# Patient Record
Sex: MALE | Race: BLACK OR AFRICAN AMERICAN | Employment: OTHER | URBAN - METROPOLITAN AREA
[De-identification: names, ages, dates, MRNs, and addresses within clinical notes are randomized per-mention and may not be internally consistent; named-entity substitution may affect disease eponyms.]

---

## 2021-03-26 ENCOUNTER — APPOINTMENT (OUTPATIENT)
Dept: CT IMAGING | Age: 76
DRG: 682 | End: 2021-03-26
Attending: EMERGENCY MEDICINE
Payer: MEDICARE

## 2021-03-26 ENCOUNTER — HOSPITAL ENCOUNTER (INPATIENT)
Age: 76
LOS: 11 days | Discharge: NURSING, REHAB OR LONG-TERM CARE | DRG: 682 | End: 2021-04-07
Attending: EMERGENCY MEDICINE | Admitting: FAMILY MEDICINE
Payer: MEDICARE

## 2021-03-26 ENCOUNTER — APPOINTMENT (OUTPATIENT)
Dept: GENERAL RADIOLOGY | Age: 76
DRG: 682 | End: 2021-03-26
Attending: EMERGENCY MEDICINE
Payer: MEDICARE

## 2021-03-26 DIAGNOSIS — I48.91 ATRIAL FIBRILLATION, RAPID (HCC): ICD-10-CM

## 2021-03-26 DIAGNOSIS — E86.1 HYPOVOLEMIA: Primary | ICD-10-CM

## 2021-03-26 DIAGNOSIS — N17.9 ACUTE RENAL FAILURE, UNSPECIFIED ACUTE RENAL FAILURE TYPE (HCC): ICD-10-CM

## 2021-03-26 LAB
ALBUMIN SERPL-MCNC: 3.3 G/DL (ref 3.5–5)
ALBUMIN/GLOB SERPL: 0.6 {RATIO} (ref 1.1–2.2)
ALP SERPL-CCNC: 131 U/L (ref 45–117)
ALT SERPL-CCNC: 90 U/L (ref 12–78)
ANION GAP SERPL CALC-SCNC: 20 MMOL/L (ref 5–15)
AST SERPL W P-5'-P-CCNC: 97 U/L (ref 15–37)
BASOPHILS # BLD: 0 K/UL (ref 0–0.1)
BASOPHILS NFR BLD: 0 % (ref 0–1)
BILIRUB SERPL-MCNC: 0.6 MG/DL (ref 0.2–1)
BUN SERPL-MCNC: 207 MG/DL (ref 6–20)
BUN/CREAT SERPL: 25 (ref 12–20)
CA-I BLD-MCNC: 9.6 MG/DL (ref 8.5–10.1)
CHLORIDE SERPL-SCNC: 116 MMOL/L (ref 97–108)
CO2 SERPL-SCNC: 18 MMOL/L (ref 21–32)
CREAT SERPL-MCNC: 8.18 MG/DL (ref 0.7–1.3)
DIFFERENTIAL METHOD BLD: ABNORMAL
EOSINOPHIL # BLD: 0 K/UL (ref 0–0.4)
EOSINOPHIL NFR BLD: 0 % (ref 0–7)
ERYTHROCYTE [DISTWIDTH] IN BLOOD BY AUTOMATED COUNT: 20.1 % (ref 11.5–14.5)
GLOBULIN SER CALC-MCNC: 5.3 G/DL (ref 2–4)
GLUCOSE SERPL-MCNC: 119 MG/DL (ref 65–100)
HCT VFR BLD AUTO: 46.4 % (ref 36.6–50.3)
HGB BLD-MCNC: 14.2 G/DL (ref 12.1–17)
IMM GRANULOCYTES # BLD AUTO: 0 K/UL (ref 0–0.04)
IMM GRANULOCYTES NFR BLD AUTO: 0 % (ref 0–0.5)
LACTATE SERPL-SCNC: 1.8 MMOL/L (ref 0.4–2)
LYMPHOCYTES # BLD: 0.8 K/UL (ref 0.8–3.5)
LYMPHOCYTES NFR BLD: 6 % (ref 12–49)
MCH RBC QN AUTO: 28 PG (ref 26–34)
MCHC RBC AUTO-ENTMCNC: 30.6 G/DL (ref 30–36.5)
MCV RBC AUTO: 91.3 FL (ref 80–99)
MONOCYTES # BLD: 0.4 K/UL (ref 0–1)
MONOCYTES NFR BLD: 3 % (ref 5–13)
NEUTS SEG # BLD: 10.8 K/UL (ref 1.8–8)
NEUTS SEG NFR BLD: 91 % (ref 32–75)
PLATELET # BLD AUTO: 261 K/UL (ref 150–400)
PMV BLD AUTO: 11.8 FL (ref 8.9–12.9)
POTASSIUM SERPL-SCNC: 5.6 MMOL/L (ref 3.5–5.1)
PROT SERPL-MCNC: 8.6 G/DL (ref 6.4–8.2)
RBC # BLD AUTO: 5.08 M/UL (ref 4.1–5.7)
SODIUM SERPL-SCNC: 154 MMOL/L (ref 136–145)
WBC # BLD AUTO: 11.9 K/UL (ref 4.1–11.1)

## 2021-03-26 PROCEDURE — 70450 CT HEAD/BRAIN W/O DYE: CPT

## 2021-03-26 PROCEDURE — 99285 EMERGENCY DEPT VISIT HI MDM: CPT

## 2021-03-26 PROCEDURE — 84443 ASSAY THYROID STIM HORMONE: CPT

## 2021-03-26 PROCEDURE — 74011250636 HC RX REV CODE- 250/636: Performed by: EMERGENCY MEDICINE

## 2021-03-26 PROCEDURE — 74176 CT ABD & PELVIS W/O CONTRAST: CPT

## 2021-03-26 PROCEDURE — 96361 HYDRATE IV INFUSION ADD-ON: CPT

## 2021-03-26 PROCEDURE — 71045 X-RAY EXAM CHEST 1 VIEW: CPT

## 2021-03-26 PROCEDURE — 83605 ASSAY OF LACTIC ACID: CPT

## 2021-03-26 PROCEDURE — 80053 COMPREHEN METABOLIC PANEL: CPT

## 2021-03-26 PROCEDURE — 82550 ASSAY OF CK (CPK): CPT

## 2021-03-26 PROCEDURE — 83735 ASSAY OF MAGNESIUM: CPT

## 2021-03-26 PROCEDURE — 84484 ASSAY OF TROPONIN QUANT: CPT

## 2021-03-26 PROCEDURE — 93005 ELECTROCARDIOGRAM TRACING: CPT

## 2021-03-26 PROCEDURE — 85025 COMPLETE CBC W/AUTO DIFF WBC: CPT

## 2021-03-26 PROCEDURE — 36415 COLL VENOUS BLD VENIPUNCTURE: CPT

## 2021-03-26 PROCEDURE — 87040 BLOOD CULTURE FOR BACTERIA: CPT

## 2021-03-26 RX ORDER — SODIUM CHLORIDE, SODIUM LACTATE, POTASSIUM CHLORIDE, CALCIUM CHLORIDE 600; 310; 30; 20 MG/100ML; MG/100ML; MG/100ML; MG/100ML
1000 INJECTION, SOLUTION INTRAVENOUS CONTINUOUS
Status: DISCONTINUED | OUTPATIENT
Start: 2021-03-26 | End: 2021-04-01

## 2021-03-26 RX ADMIN — SODIUM CHLORIDE, POTASSIUM CHLORIDE, SODIUM LACTATE AND CALCIUM CHLORIDE 1000 ML: 600; 310; 30; 20 INJECTION, SOLUTION INTRAVENOUS at 23:17

## 2021-03-26 RX ADMIN — SODIUM CHLORIDE 500 ML: 9 INJECTION, SOLUTION INTRAVENOUS at 22:31

## 2021-03-26 RX ADMIN — SODIUM CHLORIDE 1000 ML: 9 INJECTION, SOLUTION INTRAVENOUS at 22:31

## 2021-03-26 NOTE — Clinical Note
Status[de-identified] INPATIENT [101]   Type of Bed: Remote Telemetry [29]   Inpatient Hospitalization Certified Necessary for the Following Reasons: 3. Patient receiving treatment that can only be provided in an inpatient setting (further clarification in H&P documentation)   Admitting Diagnosis: Acute renal failure (ARF) ([facility] [4609073]   Admitting Diagnosis: Hypovolemia [276.52. ICD-9-CM]   Admitting Diagnosis: Atrial fibrillation, rapid [facility]) [[id number]]   Admitting Physician: Kelly Zapata [[id number]]   Attending Physician: Kelly Zapata [[id number]]   Estimated Length of Stay: 3-4 Midnights   Discharge Plan[de-identified] Extended Care Facility (e.g. Adult Home, Nursing Home, etc.)

## 2021-03-27 PROBLEM — N17.9 ACUTE RENAL FAILURE (ARF) (HCC): Status: ACTIVE | Noted: 2021-03-27

## 2021-03-27 PROBLEM — N17.9 AKI (ACUTE KIDNEY INJURY) (HCC): Status: ACTIVE | Noted: 2021-03-27

## 2021-03-27 PROBLEM — I48.91 ATRIAL FIBRILLATION, RAPID (HCC): Status: ACTIVE | Noted: 2021-03-27

## 2021-03-27 PROBLEM — E86.1 HYPOVOLEMIA: Status: ACTIVE | Noted: 2021-03-27

## 2021-03-27 LAB
ALBUMIN SERPL-MCNC: 2.3 G/DL (ref 3.5–5)
ANION GAP SERPL CALC-SCNC: 13 MMOL/L (ref 5–15)
ANION GAP SERPL CALC-SCNC: 16 MMOL/L (ref 5–15)
APPEARANCE UR: ABNORMAL
APTT PPP: 26.1 SEC (ref 23–35.7)
ATRIAL RATE: 147 BPM
BACTERIA URNS QL MICRO: NEGATIVE /HPF
BACTERIA URNS QL MICRO: NEGATIVE /HPF
BASOPHILS # BLD: 0 K/UL (ref 0–0.1)
BASOPHILS NFR BLD: 0 % (ref 0–1)
BILIRUB UR QL: NEGATIVE
BUN SERPL-MCNC: 179 MG/DL (ref 6–20)
BUN SERPL-MCNC: 186 MG/DL (ref 6–20)
BUN/CREAT SERPL: 27 (ref 12–20)
BUN/CREAT SERPL: 28 (ref 12–20)
CA-I BLD-MCNC: 8.1 MG/DL (ref 8.5–10.1)
CA-I BLD-MCNC: 8.2 MG/DL (ref 8.5–10.1)
CALCULATED R AXIS, ECG10: 36 DEGREES
CALCULATED T AXIS, ECG11: -177 DEGREES
CHLORIDE SERPL-SCNC: 122 MMOL/L (ref 97–108)
CHLORIDE SERPL-SCNC: 125 MMOL/L (ref 97–108)
CK SERPL-CCNC: 763 U/L (ref 39–308)
CO2 SERPL-SCNC: 17 MMOL/L (ref 21–32)
CO2 SERPL-SCNC: 20 MMOL/L (ref 21–32)
COLOR UR: YELLOW
COVID-19 RAPID TEST, COVR: NOT DETECTED
CREAT SERPL-MCNC: 6.44 MG/DL (ref 0.7–1.3)
CREAT SERPL-MCNC: 6.79 MG/DL (ref 0.7–1.3)
DIAGNOSIS, 93000: NORMAL
DIFFERENTIAL METHOD BLD: ABNORMAL
EOSINOPHIL # BLD: 0 K/UL (ref 0–0.4)
EOSINOPHIL NFR BLD: 0 % (ref 0–7)
ERYTHROCYTE [DISTWIDTH] IN BLOOD BY AUTOMATED COUNT: 20.7 % (ref 11.5–14.5)
GLUCOSE BLD STRIP.AUTO-MCNC: 84 MG/DL (ref 65–100)
GLUCOSE SERPL-MCNC: 76 MG/DL (ref 65–100)
GLUCOSE SERPL-MCNC: 79 MG/DL (ref 65–100)
GLUCOSE UR STRIP.AUTO-MCNC: NEGATIVE MG/DL
GRAN CASTS URNS QL MICRO: 4 /LPF
HCT VFR BLD AUTO: 38.4 % (ref 36.6–50.3)
HGB BLD-MCNC: 11.5 G/DL (ref 12.1–17)
HGB UR QL STRIP: ABNORMAL
HYALINE CASTS URNS QL MICRO: >20 /LPF (ref 0–5)
IMM GRANULOCYTES # BLD AUTO: 0 K/UL (ref 0–0.04)
IMM GRANULOCYTES NFR BLD AUTO: 0 % (ref 0–0.5)
KETONES UR QL STRIP.AUTO: NEGATIVE MG/DL
LEUKOCYTE ESTERASE UR QL STRIP.AUTO: ABNORMAL
LYMPHOCYTES # BLD: 0.2 K/UL (ref 0.8–3.5)
LYMPHOCYTES NFR BLD: 2 % (ref 12–49)
MAGNESIUM SERPL-MCNC: 5.6 MG/DL (ref 1.6–2.4)
MCH RBC QN AUTO: 27.6 PG (ref 26–34)
MCHC RBC AUTO-ENTMCNC: 29.9 G/DL (ref 30–36.5)
MCV RBC AUTO: 92.1 FL (ref 80–99)
MONOCYTES # BLD: 0.3 K/UL (ref 0–1)
MONOCYTES NFR BLD: 3 % (ref 5–13)
MUCOUS THREADS URNS QL MICRO: ABNORMAL /LPF
NEUTS SEG # BLD: 8.5 K/UL (ref 1.8–8)
NEUTS SEG NFR BLD: 95 % (ref 32–75)
NITRITE UR QL STRIP.AUTO: NEGATIVE
PERFORMED BY, TECHID: NORMAL
PH UR STRIP: 5 [PH] (ref 5–8)
PHOSPHATE SERPL-MCNC: 7.8 MG/DL (ref 2.6–4.7)
PLATELET # BLD AUTO: 173 K/UL (ref 150–400)
PMV BLD AUTO: 11.6 FL (ref 8.9–12.9)
POTASSIUM SERPL-SCNC: 5.8 MMOL/L (ref 3.5–5.1)
POTASSIUM SERPL-SCNC: 5.9 MMOL/L (ref 3.5–5.1)
PROT UR STRIP-MCNC: 100 MG/DL
Q-T INTERVAL, ECG07: 292 MS
QRS DURATION, ECG06: 90 MS
QTC CALCULATION (BEZET), ECG08: 456 MS
RBC # BLD AUTO: 4.17 M/UL (ref 4.1–5.7)
RBC #/AREA URNS HPF: ABNORMAL /HPF (ref 0–5)
RBC #/AREA URNS HPF: NORMAL /HPF (ref 0–5)
SARS-COV-2, COV2: NORMAL
SODIUM SERPL-SCNC: 155 MMOL/L (ref 136–145)
SODIUM SERPL-SCNC: 158 MMOL/L (ref 136–145)
SP GR UR REFRACTOMETRY: 1.02 (ref 1–1.03)
SPECIMEN SOURCE: NORMAL
THERAPEUTIC RANGE,PTTT: NORMAL SEC (ref 68–109)
TROPONIN I SERPL-MCNC: 0.33 NG/ML
TROPONIN I SERPL-MCNC: 0.57 NG/ML
TSH SERPL DL<=0.05 MIU/L-ACNC: 1.43 UIU/ML (ref 0.36–3.74)
UROBILINOGEN UR QL STRIP.AUTO: 0.1 EU/DL (ref 0.1–1)
VENTRICULAR RATE, ECG03: 147 BPM
WBC # BLD AUTO: 8.9 K/UL (ref 4.1–11.1)
WBC URNS QL MICRO: ABNORMAL /HPF (ref 0–4)
WBC URNS QL MICRO: NORMAL /HPF (ref 0–4)

## 2021-03-27 PROCEDURE — 74011250636 HC RX REV CODE- 250/636: Performed by: EMERGENCY MEDICINE

## 2021-03-27 PROCEDURE — 65270000029 HC RM PRIVATE

## 2021-03-27 PROCEDURE — 85730 THROMBOPLASTIN TIME PARTIAL: CPT

## 2021-03-27 PROCEDURE — 74011250637 HC RX REV CODE- 250/637: Performed by: FAMILY MEDICINE

## 2021-03-27 PROCEDURE — 74011250636 HC RX REV CODE- 250/636: Performed by: FAMILY MEDICINE

## 2021-03-27 PROCEDURE — 74011000250 HC RX REV CODE- 250: Performed by: EMERGENCY MEDICINE

## 2021-03-27 PROCEDURE — 74011000258 HC RX REV CODE- 258: Performed by: INTERNAL MEDICINE

## 2021-03-27 PROCEDURE — 80048 BASIC METABOLIC PNL TOTAL CA: CPT

## 2021-03-27 PROCEDURE — 74011000258 HC RX REV CODE- 258: Performed by: EMERGENCY MEDICINE

## 2021-03-27 PROCEDURE — 74011250637 HC RX REV CODE- 250/637: Performed by: INTERNAL MEDICINE

## 2021-03-27 PROCEDURE — 85025 COMPLETE CBC W/AUTO DIFF WBC: CPT

## 2021-03-27 PROCEDURE — 87635 SARS-COV-2 COVID-19 AMP PRB: CPT

## 2021-03-27 PROCEDURE — 74011250636 HC RX REV CODE- 250/636: Performed by: INTERNAL MEDICINE

## 2021-03-27 PROCEDURE — 96365 THER/PROPH/DIAG IV INF INIT: CPT

## 2021-03-27 PROCEDURE — 74011250637 HC RX REV CODE- 250/637

## 2021-03-27 PROCEDURE — 74011000250 HC RX REV CODE- 250: Performed by: INTERNAL MEDICINE

## 2021-03-27 PROCEDURE — 81001 URINALYSIS AUTO W/SCOPE: CPT

## 2021-03-27 PROCEDURE — 36415 COLL VENOUS BLD VENIPUNCTURE: CPT

## 2021-03-27 PROCEDURE — 82962 GLUCOSE BLOOD TEST: CPT

## 2021-03-27 PROCEDURE — 96376 TX/PRO/DX INJ SAME DRUG ADON: CPT

## 2021-03-27 PROCEDURE — 87086 URINE CULTURE/COLONY COUNT: CPT

## 2021-03-27 PROCEDURE — 96374 THER/PROPH/DIAG INJ IV PUSH: CPT

## 2021-03-27 PROCEDURE — 80069 RENAL FUNCTION PANEL: CPT

## 2021-03-27 RX ORDER — HEPARIN SODIUM 10000 [USP'U]/100ML
12-25 INJECTION, SOLUTION INTRAVENOUS
Status: DISCONTINUED | OUTPATIENT
Start: 2021-03-27 | End: 2021-04-01

## 2021-03-27 RX ORDER — SODIUM CHLORIDE, SODIUM LACTATE, POTASSIUM CHLORIDE, CALCIUM CHLORIDE 600; 310; 30; 20 MG/100ML; MG/100ML; MG/100ML; MG/100ML
1000 INJECTION, SOLUTION INTRAVENOUS CONTINUOUS
Status: DISCONTINUED | OUTPATIENT
Start: 2021-03-27 | End: 2021-04-01

## 2021-03-27 RX ORDER — SODIUM CHLORIDE, SODIUM LACTATE, POTASSIUM CHLORIDE, CALCIUM CHLORIDE 600; 310; 30; 20 MG/100ML; MG/100ML; MG/100ML; MG/100ML
125 INJECTION, SOLUTION INTRAVENOUS CONTINUOUS
Status: DISCONTINUED | OUTPATIENT
Start: 2021-03-27 | End: 2021-04-01

## 2021-03-27 RX ORDER — ATORVASTATIN CALCIUM 40 MG/1
40 TABLET, FILM COATED ORAL
Status: DISCONTINUED | OUTPATIENT
Start: 2021-03-27 | End: 2021-04-08 | Stop reason: HOSPADM

## 2021-03-27 RX ORDER — POLYETHYLENE GLYCOL 3350 17 G/17G
17 POWDER, FOR SOLUTION ORAL DAILY PRN
Status: DISCONTINUED | OUTPATIENT
Start: 2021-03-27 | End: 2021-04-08 | Stop reason: HOSPADM

## 2021-03-27 RX ORDER — ASPIRIN 325 MG
325 TABLET ORAL DAILY
Status: DISCONTINUED | OUTPATIENT
Start: 2021-03-28 | End: 2021-04-04

## 2021-03-27 RX ORDER — ONDANSETRON 4 MG/1
4 TABLET, ORALLY DISINTEGRATING ORAL
Status: DISCONTINUED | OUTPATIENT
Start: 2021-03-27 | End: 2021-04-08 | Stop reason: HOSPADM

## 2021-03-27 RX ORDER — DILTIAZEM HCL/D5W 125 MG/125
0-15 PLASTIC BAG, INJECTION (ML) INTRAVENOUS
Status: DISCONTINUED | OUTPATIENT
Start: 2021-03-27 | End: 2021-03-27

## 2021-03-27 RX ORDER — HEPARIN SODIUM 5000 [USP'U]/ML
5000 INJECTION, SOLUTION INTRAVENOUS; SUBCUTANEOUS EVERY 8 HOURS
Status: DISCONTINUED | OUTPATIENT
Start: 2021-03-27 | End: 2021-03-27

## 2021-03-27 RX ORDER — SODIUM CHLORIDE 9 MG/ML
75 INJECTION, SOLUTION INTRAVENOUS CONTINUOUS
Status: DISCONTINUED | OUTPATIENT
Start: 2021-03-27 | End: 2021-03-27

## 2021-03-27 RX ORDER — DILTIAZEM HYDROCHLORIDE 5 MG/ML
10 INJECTION INTRAVENOUS
Status: COMPLETED | OUTPATIENT
Start: 2021-03-27 | End: 2021-03-27

## 2021-03-27 RX ORDER — DEXTROSE MONOHYDRATE AND SODIUM CHLORIDE 5; .225 G/100ML; G/100ML
150 INJECTION, SOLUTION INTRAVENOUS CONTINUOUS
Status: DISCONTINUED | OUTPATIENT
Start: 2021-03-27 | End: 2021-03-30

## 2021-03-27 RX ORDER — HEPARIN SODIUM 1000 [USP'U]/ML
30 INJECTION, SOLUTION INTRAVENOUS; SUBCUTANEOUS AS NEEDED
Status: DISCONTINUED | OUTPATIENT
Start: 2021-03-27 | End: 2021-04-01

## 2021-03-27 RX ORDER — CALCIUM GLUCONATE 20 MG/ML
1 INJECTION, SOLUTION INTRAVENOUS ONCE
Status: COMPLETED | OUTPATIENT
Start: 2021-03-27 | End: 2021-03-27

## 2021-03-27 RX ORDER — HEPARIN SODIUM 1000 [USP'U]/ML
60 INJECTION, SOLUTION INTRAVENOUS; SUBCUTANEOUS AS NEEDED
Status: DISCONTINUED | OUTPATIENT
Start: 2021-03-27 | End: 2021-04-01

## 2021-03-27 RX ORDER — ACETAMINOPHEN 650 MG/1
650 SUPPOSITORY RECTAL
Status: DISCONTINUED | OUTPATIENT
Start: 2021-03-27 | End: 2021-04-08 | Stop reason: HOSPADM

## 2021-03-27 RX ORDER — SODIUM POLYSTYRENE SULFONATE 15 G/60ML
30 SUSPENSION ORAL; RECTAL
Status: COMPLETED | OUTPATIENT
Start: 2021-03-27 | End: 2021-03-27

## 2021-03-27 RX ORDER — ACETAMINOPHEN 325 MG/1
650 TABLET ORAL
Status: DISCONTINUED | OUTPATIENT
Start: 2021-03-27 | End: 2021-04-08 | Stop reason: HOSPADM

## 2021-03-27 RX ORDER — ONDANSETRON 2 MG/ML
4 INJECTION INTRAMUSCULAR; INTRAVENOUS
Status: DISCONTINUED | OUTPATIENT
Start: 2021-03-27 | End: 2021-04-08 | Stop reason: HOSPADM

## 2021-03-27 RX ADMIN — SODIUM POLYSTYRENE SULFONATE 30 G: 15 SUSPENSION ORAL; RECTAL at 14:43

## 2021-03-27 RX ADMIN — CALCIUM GLUCONATE 1000 MG: 20 INJECTION, SOLUTION INTRAVENOUS at 14:43

## 2021-03-27 RX ADMIN — NITROGLYCERIN 1 INCH: 20 OINTMENT TOPICAL at 17:44

## 2021-03-27 RX ADMIN — DEXTROSE AND SODIUM CHLORIDE 150 ML/HR: 5; 200 INJECTION, SOLUTION INTRAVENOUS at 17:45

## 2021-03-27 RX ADMIN — Medication 10 MG/HR: at 10:37

## 2021-03-27 RX ADMIN — HEPARIN SODIUM 16 UNITS/KG/HR: 10000 INJECTION, SOLUTION INTRAVENOUS at 18:07

## 2021-03-27 RX ADMIN — Medication 5 MG/HR: at 01:51

## 2021-03-27 RX ADMIN — SODIUM CHLORIDE, POTASSIUM CHLORIDE, SODIUM LACTATE AND CALCIUM CHLORIDE 125 ML/HR: 600; 310; 30; 20 INJECTION, SOLUTION INTRAVENOUS at 03:46

## 2021-03-27 RX ADMIN — SODIUM CHLORIDE, POTASSIUM CHLORIDE, SODIUM LACTATE AND CALCIUM CHLORIDE 1000 ML: 600; 310; 30; 20 INJECTION, SOLUTION INTRAVENOUS at 01:46

## 2021-03-27 RX ADMIN — DEXTROSE AND SODIUM CHLORIDE 150 ML/HR: 5; 200 INJECTION, SOLUTION INTRAVENOUS at 18:10

## 2021-03-27 RX ADMIN — DEXTROSE MONOHYDRATE 150 MG: 50 INJECTION, SOLUTION INTRAVENOUS at 15:03

## 2021-03-27 RX ADMIN — DILTIAZEM HYDROCHLORIDE 10 MG: 5 INJECTION INTRAVENOUS at 01:45

## 2021-03-27 RX ADMIN — CEFTRIAXONE SODIUM 1 G: 1 INJECTION, POWDER, FOR SOLUTION INTRAMUSCULAR; INTRAVENOUS at 14:44

## 2021-03-27 RX ADMIN — SODIUM CHLORIDE, POTASSIUM CHLORIDE, SODIUM LACTATE AND CALCIUM CHLORIDE 1000 ML: 600; 310; 30; 20 INJECTION, SOLUTION INTRAVENOUS at 05:02

## 2021-03-27 RX ADMIN — SODIUM CHLORIDE 75 ML/HR: 9 INJECTION, SOLUTION INTRAVENOUS at 12:25

## 2021-03-27 RX ADMIN — PIPERACILLIN AND TAZOBACTAM 3.38 G: 3; .375 INJECTION, POWDER, FOR SOLUTION INTRAVENOUS at 00:17

## 2021-03-27 RX ADMIN — AMIODARONE HYDROCHLORIDE 1 MG/MIN: 50 INJECTION, SOLUTION INTRAVENOUS at 15:20

## 2021-03-27 RX ADMIN — NITROGLYCERIN 1 INCH: 20 OINTMENT TOPICAL at 21:48

## 2021-03-27 NOTE — CONSULTS
This is an inpatient cardiology consultation requested by Dr. Manish Watters for paroxysmal atrial fibrillation with rapid ventricular response. CHIEF COMPLAINT:  Lethargy and weakness    HISTORY OF PRESENT ILLNESS  75yom with known dementia and prior stroke apparently noncommunicative at baseline. History is obtained from chart and discussion with other consultants. Has had decreased oral intake for the last several days. I was called for atrial fibrillation with rapid ventricular response. Patient cannot tell me whether or not he experiences palpitations. No known history of atrial fibrillation per chart review. He does have history of massive stroke in the past and this is corroborated by CT here in the hospital.  He does have known history of CKD per chart however I have no prior labs for review. I have discussed with nephrology consultant who notes that the severity of current renal dysfunction is new. Patient does appear to be making urine. Frankly turbid per urinalysis as well with 20-50 WBC. Patient has already been started on broad-spectrum antibiotics by emergency department. At current time continues on diltiazem infusion. Heart rate is fast in the 130s. Irregular. He remains hemodynamically stable. No prior anticoagulation. He is not on anticoagulation at the current time other than DVT prophylaxis dosing of heparin. REVIEW OF SYSTEMS  All other systems reviewed and are negative except for the pertinent positives as noted in the HPI. PAST MEDICAL HISTORY  Past Medical History:   Diagnosis Date    Anemia     BPH (benign prostatic hyperplasia)     CKD (chronic kidney disease)     [facility]     Dementia ([facility]     DVT (deep venous thrombosis) (HCC)     GERD (gastroesophageal reflux disease)     Glaucoma     Hyperlipemia          SOCIAL HISTORY  No alcohol, tobacco, or drug use. FAMILY HISTORY  Fully reviewed and negative.  No history of early heart disease amongst first degree relatives. PHYSICAL EXAMINATION  Visit Vitals  /63   Pulse (!) 117   Temp 97.4 °F (36.3 °C)   Resp 24   Ht 6' 2\" (1.88 m)   Wt 56.7 kg (125 lb)   SpO2 94%   BMI 16.05 kg/m²       General: Not conversant. Minimally interactive. ?baseline. HEENT/neck: no JVD, no masses, trachea midline. Pulmonary: Decreased breath sounds than expected I cannot appreciate crackles. Cardiovascular: Irregular rate irregular rhythm; no murmurs, rubs or gallops. Normal point of maximal impulse. No peripheral edema   GI: soft, nontender, no hepatosplenomegaly  Hematology/Oncology: no lymphadenopathy; no bruising  Skin: warm and dry, no rashes, lesions, or ulcer  Musculoskeletal: Moving all four extremities. Gait and station not assessed    MEDICAL DECISION MAKING  ECG was independently viewed, my impression: Atrial fibrillation with rapid ventricular response    Laboratory panel was independently viewed, my impression: Acute kidney injury stage III secondary to prerenal azotemia with BUN value of 186. Urinalysis was independently viewed, my impression: 20-50 WBCs seen    Head CT was independently viewed, my impression: Prior stroke. No acute process. Laboratory panel independently viewed, my impression: NSTEMI type II with elevated troponin    IMPRESSION/PLAN  Paroxysmal atrial fibrillation with rapid ventricular response  Acute kidney injury stage III secondary to prerenal azotemia  NSTEMI type II secondary to supply-demand, not primary event  Urinary tract infection  Hyperkalemia secondary to renal failure (issue #2)    For paroxysmal atrial fibrillation I will stop diltiazem infusion amiodarone bolus and infusion. Will begin heparin infusion for stroke prophylaxis (especially important for this man with prior stroke). At time of eventual disposition consideration can be given to low-dose Eliquis therapy.   For NSTEMI type II focus clinical care on management of underlying disease process. For acute kidney injury stage III do recommend fluid hydration. Defer to nephrology consultation. Agree with broad-spectrum antibiotics for urinary tract infection. Patient is at high risk for sepsis. For hyperkalemia do recommend therapy for renal failure. Given the multiple metabolic derangements IV calcium infusion is reasonable to help prevent against further malignant arrhythmia. Nephrology consultation agrees. Please feel free to call with any questions or concerns. I do recommend echocardiogram (ordered).

## 2021-03-27 NOTE — ED NOTES
Left voicemail for PICC team to evaluate pt in am for better access to accommodate fluid volume need

## 2021-03-27 NOTE — ED TRIAGE NOTES
Pt sent from [facility] reports since admission on 3/24 pt has not taken medication nor eating or drinking. Pt at baseline mental status per staff.

## 2021-03-27 NOTE — H&P
History and Physical    NAME: Carson Juarez   :  1945   MRN:  [id number]     Date/Time:  3/27/2021 4:42 PM    Patient PCP: Damaris Jeong MD  ______________________________________________________________________             Subjective:     CHIEF COMPLAINT:     Lethargic and weakness    HISTORY OF PRESENT ILLNESS:       Patient is a 66y.o. year old male with significant past medical history of dementia history of stroke in the past history of COVID-19 pneumonitis in the past obstructive uropathy acute kidney injury proximal A. fib nonpressure chronic ulcer of the lower leg history of MRSA infection major depression history of acute emboli and thrombosis history of peripheral vascular disease history of hypertension hyperlipidemia history of GERD history of BPH history of CVA was transferred from the nursing home because on eating drinking well since patient was diagnosed with Covid after that stopped eating drinking  Seen by the ER physician work-up shows acute kidney injury with hyperkalemia    Past Medical History:   Diagnosis Date    Anemia     BPH (benign prostatic hyperplasia)     CKD (chronic kidney disease)     [facility]     Dementia ([facility]     DVT (deep venous thrombosis) ([facility]     GERD (gastroesophageal reflux disease)     Glaucoma     Hyperlipemia         No past surgical history on file. Social History     Tobacco Use    Smoking status: Unknown If Ever Smoked   Substance Use Topics    Alcohol use: Not Currently        No family history on file.     Allergies   Allergen Reactions    Flonase [Fluticasone] Unknown (comments)        Prior to Admission medications    Not on File         Current Facility-Administered Medications:     lactated Ringers infusion 1,000 mL, 1,000 mL, IntraVENous, CONTINUOUS, Josep Moon MD, Stopped at 21 0536    lactated Ringers infusion, 125 mL/hr, IntraVENous, CONTINUOUS, Josep Moon MD, Last Rate: 125 mL/hr at 03/27/21 0346, 125 mL/hr at 03/27/21 0346    lactated Ringers infusion 1,000 mL, 1,000 mL, IntraVENous, CONTINUOUS, Unique Moon MD, Stopped at 03/27/21 0535    acetaminophen (TYLENOL) tablet 650 mg, 650 mg, Oral, Q6H PRN **OR** acetaminophen (TYLENOL) suppository 650 mg, 650 mg, Rectal, Q6H PRN, Unique Moon MD    polyethylene glycol (MIRALAX) packet 17 g, 17 g, Oral, DAILY PRN, Unique Moon MD    ondansetron (ZOFRAN ODT) tablet 4 mg, 4 mg, Oral, Q8H PRN **OR** ondansetron (ZOFRAN) injection 4 mg, 4 mg, IntraVENous, Q6H PRN, Carlene Moon MD    heparin 25,000 units in D5W 250 ml infusion, 12-25 Units/kg/hr, IntraVENous, TITRATE, Rah Pedroza MD    amiodarone (CORDARONE) 375 mg in dextrose 5% 250 mL infusion, 0.5-1 mg/min, IntraVENous, TITRATE, Rah Pedroza MD    heparin (porcine) 1,000 unit/mL injection 3,400 Units, 60 Units/kg, IntraVENous, PRN **OR** heparin (porcine) 1,000 unit/mL injection 1,700 Units, 30 Units/kg, IntraVENous, PRN, Rah Pedroza MD    dextrose 5 % - 0.2% NaCl infusion, 150 mL/hr, IntraVENous, CONTINUOUS, Blanca Hand MD    cefTRIAXone (ROCEPHIN) 1 g in sterile water (preservative free) 10 mL IV syringe, 1 g, IntraVENous, Q24H, Brendan Coreas MD, 1 g at 03/27/21 1444    lactated Ringers infusion 1,000 mL, 1,000 mL, IntraVENous, CONTINUOUS, Carlene Moon MD, 1,000 mL at 03/26/21 2317    LAB DATA REVIEWED:    Recent Results (from the past 24 hour(s))   EKG, 12 LEAD, INITIAL    Collection Time: 03/26/21  8:37 PM   Result Value Ref Range    Ventricular Rate 147 BPM    Atrial Rate 147 BPM    QRS Duration 90 ms    Q-T Interval 292 ms    QTC Calculation (Bezet) 456 ms    Calculated R Axis 36 degrees    Calculated T Axis -177 degrees    Diagnosis       Atrial fibrillation with rapid ventricular response  ST & T wave abnormality, consider inferolateral ischemia  Abnormal ECG  No previous ECGs available  Confirmed by Aaron Walton ([id number]) on 3/27/2021 8:49:30 AM     CBC WITH AUTOMATED DIFF    Collection Time: 03/26/21  8:45 PM   Result Value Ref Range    WBC 11.9 (H) 4.1 - 11.1 K/uL    RBC 5.08 4.10 - 5.70 M/uL    HGB 14.2 12.1 - 17.0 g/dL    HCT 46.4 36.6 - 50.3 %    MCV 91.3 80.0 - 99.0 FL    MCH 28.0 26.0 - 34.0 PG    MCHC 30.6 30.0 - 36.5 g/dL    RDW 20.1 (H) 11.5 - 14.5 %    PLATELET 167 132 - 451 K/uL    MPV 11.8 8.9 - 12.9 FL    NEUTROPHILS 91 (H) 32 - 75 %    LYMPHOCYTES 6 (L) 12 - 49 %    MONOCYTES 3 (L) 5 - 13 %    EOSINOPHILS 0 0 - 7 %    BASOPHILS 0 0 - 1 %    IMMATURE GRANULOCYTES 0 0.0 - 0.5 %    ABS. NEUTROPHILS 10.8 (H) 1.8 - 8.0 K/UL    ABS. LYMPHOCYTES 0.8 0.8 - 3.5 K/UL    ABS. MONOCYTES 0.4 0.0 - 1.0 K/UL    ABS. EOSINOPHILS 0.0 0.0 - 0.4 K/UL    ABS. BASOPHILS 0.0 0.0 - 0.1 K/UL    ABS. IMM. GRANS. 0.0 0.00 - 0.04 K/UL    DF AUTOMATED     METABOLIC PANEL, COMPREHENSIVE    Collection Time: 03/26/21  8:45 PM   Result Value Ref Range    Sodium 154 (H) 136 - 145 mmol/L    Potassium 5.6 (H) 3.5 - 5.1 mmol/L    Chloride 116 (H) 97 - 108 mmol/L    CO2 18 (L) 21 - 32 mmol/L    Anion gap 20 (H) 5 - 15 mmol/L    Glucose 119 (H) 65 - 100 mg/dL     (H) 6 - 20 mg/dL    Creatinine 8.18 (H) 0.70 - 1.30 mg/dL    BUN/Creatinine ratio 25 (H) 12 - 20      GFR est AA 8 (L) >60 ml/min/1.73m2    GFR est non-AA 6 (L) >60 ml/min/1.73m2    Calcium 9.6 8.5 - 10.1 mg/dL    Bilirubin, total 0.6 0.2 - 1.0 mg/dL    AST (SGOT) 97 (H) 15 - 37 U/L    ALT (SGPT) 90 (H) 12 - 78 U/L    Alk.  phosphatase 131 (H) 45 - 117 U/L    Protein, total 8.6 (H) 6.4 - 8.2 g/dL    Albumin 3.3 (L) 3.5 - 5.0 g/dL    Globulin 5.3 (H) 2.0 - 4.0 g/dL    A-G Ratio 0.6 (L) 1.1 - 2.2     LACTIC ACID    Collection Time: 03/26/21  8:45 PM   Result Value Ref Range    Lactic acid 1.8 0.4 - 2.0 mmol/L   TSH 3RD GENERATION    Collection Time: 03/26/21  8:45 PM   Result Value Ref Range    TSH 1.43 0.36 - 3.74 uIU/mL   MAGNESIUM    Collection Time: 03/26/21  8:45 PM   Result Value Ref Range Magnesium 5.6 (H) 1.6 - 2.4 mg/dL   CK    Collection Time: 03/26/21  8:45 PM   Result Value Ref Range     (H) 39 - 308 U/L   TROPONIN I    Collection Time: 03/26/21  8:45 PM   Result Value Ref Range    Troponin-I, Qt. 0.57 (H) <0.05 ng/mL   URINALYSIS W/ RFLX MICROSCOPIC    Collection Time: 03/27/21  1:55 AM   Result Value Ref Range    Color Yellow      Appearance Turbid (A) Clear      Specific gravity 1.019 1.003 - 1.030      pH (UA) 5.0 5.0 - 8.0      Protein 100 (A) Negative mg/dL    Glucose Negative Negative mg/dL    Ketone Negative Negative mg/dL    Bilirubin Negative Negative      Blood Large (A) Negative      Urobilinogen 0.1 0.1 - 1.0 EU/dL    Nitrites Negative Negative      Leukocyte Esterase Large (A) Negative      WBC 20-50 0 - 4 /hpf    RBC 20-50 0 - 5 /hpf    Bacteria Negative Negative /hpf    Hyaline cast >20 (H) 0 - 5 /lpf    Mucus 2+ /lpf    Granular cast 4 /lpf   URINE MICROSCOPIC    Collection Time: 03/27/21  1:55 AM   Result Value Ref Range    WBC 20-50 0 - 4 /hpf    RBC 20-50 0 - 5 /hpf    Bacteria Negative Negative /hpf   TROPONIN I    Collection Time: 03/27/21  6:16 AM   Result Value Ref Range    Troponin-I, Qt. 0.33 (H) <5.17 ng/mL   METABOLIC PANEL, BASIC    Collection Time: 03/27/21  6:16 AM   Result Value Ref Range    Sodium 158 (H) 136 - 145 mmol/L    Potassium 5.8 (H) 3.5 - 5.1 mmol/L    Chloride 122 (H) 97 - 108 mmol/L    CO2 20 (L) 21 - 32 mmol/L    Anion gap 16 (H) 5 - 15 mmol/L    Glucose 79 65 - 100 mg/dL     (H) 6 - 20 mg/dL    Creatinine 6.79 (H) 0.70 - 1.30 mg/dL    BUN/Creatinine ratio 27 (H) 12 - 20      GFR est AA 10 (L) >60 ml/min/1.73m2    GFR est non-AA 8 (L) >60 ml/min/1.73m2    Calcium 8.1 (L) 8.5 - 10.1 mg/dL   SARS-COV-2    Collection Time: 03/27/21  7:30 AM   Result Value Ref Range    SARS-CoV-2 Nasopharyngeal     COVID-19 RAPID TEST    Collection Time: 03/27/21  7:30 AM   Result Value Ref Range    Specimen source Nasopharyngeal      COVID-19 rapid test Not Detected Not Detected     GLUCOSE, POC    Collection Time: 03/27/21 10:52 AM   Result Value Ref Range    Glucose (POC) 84 65 - 100 mg/dL    Performed by [employer]    RENAL FUNCTION PANEL    Collection Time: 03/27/21  1:00 PM   Result Value Ref Range    Sodium 155 (H) 136 - 145 mmol/L    Potassium 5.9 (H) 3.5 - 5.1 mmol/L    Chloride 125 (H) 97 - 108 mmol/L    CO2 17 (L) 21 - 32 mmol/L    Anion gap 13 5 - 15 mmol/L    Glucose 76 65 - 100 mg/dL     (H) 6 - 20 mg/dL    Creatinine 6.44 (H) 0.70 - 1.30 mg/dL    BUN/Creatinine ratio 28 (H) 12 - 20      GFR est AA 10 (L) >60 ml/min/1.73m2    GFR est non-AA 8 (L) >60 ml/min/1.73m2    Calcium 8.2 (L) 8.5 - 10.1 mg/dL    Phosphorus 7.8 (H) 2.6 - 4.7 mg/dL    Albumin 2.3 (L) 3.5 - 5.0 g/dL       XR Results (most recent):  Results from Hospital Encounter encounter on 03/26/21   XR CHEST PORT    Narrative HISTORY:  AMS    TECHNIQUE:  AP chest radiograph    COMPARISON: None  LIMITATIONS: Rotated to the right    TUBES/LINES: None    LUNG PARENCHYMA: Diffuse interstitial marking prominence possibly with some  areas of confluence at the right lower lobe and with questionable nodular  opacities including projected in the right upper lobe near the anterior aspect  of the right first rib and a nodular opacity projected near the tip of the left  scapula  TRACHEA/BRONCHI: Normal  PULMONARY VESSELS: Normal  PLEURA: Normal  HEART: Normal  AORTIC SHADOW:Normal.    MEDIASTINUM: Normal  BONE/SOFT TISSUES: No acute abnormality. OTHER: None      Impression Rotated exam. Diffuse interstitial abnormality and with question of  some superimposed nodular areas. In the acute setting edema, infection or  combination of these is considered however, especially given the possible  nodular opacities, follow-up to radiographic resolution and/or further  evaluation with chest CT if symptoms/findings do not resolve as expected with  treatment, or if indicated otherwise.                CT ABD PELV WO CONT   Final Result   No acute abdominopelvic abnormality. Nonobstructing bilateral renal   calculi. Correlate for constipation given increased stool burden. End-stage   degenerative changes of the hips with postoperative change in the right   acetabulum. Pulmonary interstitial abnormality partially visualized in the acute   setting would suggest infection/inflammation or edema versus a combination of   these and in any case with early airspace component at the right lung base. CT HEAD WO CONT   Final Result   No acute intracranial abnormality. 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 Mild   generalized atrophy with nonspecific white matter abnormality that may indicate   chronic small vessel disease. 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 XR CHEST PORT   Final Result   Rotated exam. Diffuse interstitial abnormality and with question of   some superimposed nodular areas. In the acute setting edema, infection or   combination of these is considered however, especially given the possible   nodular opacities, follow-up to radiographic resolution and/or further   evaluation with chest CT if symptoms/findings do not resolve as expected with   treatment, or if indicated otherwise. Review of Systems:  Patient not a good historian    Objective:   VITALS:    Visit Vitals  /64   Pulse (!) 120   Temp 98 °F (36.7 °C)   Resp 24   Ht 6' 2\" (1.88 m)   Wt 56.7 kg (125 lb)   SpO2 94%   BMI 16.05 kg/m²       Physical Exam:   Constitutional: pt is oriented to person, place, and time. HENT:   Head: Normocephalic and atraumatic. Eyes: Pupils are equal, round, and reactive to light. EOM are normal.   Cardiovascular: Normal rate, regular rhythm and normal heart sounds. Pulmonary/Chest: Breath sounds normal. No wheezes. No rales. Exhibits no tenderness. Abdominal: Soft.  Bowel sounds are normal. There is no abdominal tenderness. There is no rebound and no guarding. Musculoskeletal: Normal range of motion.    Neurological: Contracted     ASSESSMENT & PLAN:    A. fib with rapid ventricular rate  Acute kidney injury  Hyperkalemia  UTI  History of recent Covid  History of obstructive uropathy  Dementia  Functional quadriplegic  History of CVA  History of DVT  Hyperlipidemia  UTI    Seen by the cardiology start on amiodarone drip after the bolus  Continue heparin drip started in the ER  Continue aspirin 80 mg daily  Lipitor 20 mg daily  Namenda 5 mg twice a day  Protonix 40 mg daily  Rocephin 1 gm iv every day   Follow-up urine cultures and blood culture    Continue IV fluids    Follow-up with neurology and the cardiology    With the labs in the morning    ________________________________________________________________________    Signed: Nuria Fernandez MD

## 2021-03-27 NOTE — PROGRESS NOTES
TC to care nurseRosalia related to left hand PIV infiltration of amiodarone. Medication stopped and moved to new PIV Right forearm; requested nurse follow up with pharmacy with regard to steps to follow post infiltration of amiodarone if any.

## 2021-03-27 NOTE — ROUTINE PROCESS
TRANSFER - OUT REPORT: 
 
Verbal report given to [facility] Nursing staff on Daquan Ortiz  being transferred to  for routine progression of care Report consisted of patients Situation, Background, Assessment and  
Recommendations(SBAR). Information from the following report(s) SBAR, ED Summary, MAR, Recent Results and Cardiac Rhythm atrial fibrillation was reviewed with the receiving nurse. Lines:  
Peripheral IV 03/26/21 Left;Posterior Hand (Active) Peripheral IV 03/27/21 Posterior;Right Hand (Active) Opportunity for questions and clarification was provided. Patient transported with: 
 Monitor Tech

## 2021-03-27 NOTE — ED NOTES
Pt arrived via ems responsive to pain, mucous membranes dry pt frail appearance. Contraction to lower legs with booties placed in nursing facility. No wound to back pr buttock. Sepsis protocol followed IV and labs drawn. staff at [facility] report no urine output. Pt arrives with echeverria in place no urine output. Pt moves right arm with ease.  No movement to left arm or bilateral legs this is baseline

## 2021-03-27 NOTE — PROGRESS NOTES
Comprehensive Nutrition Assessment    Type and Reason for Visit: Initial, Consult(poor PO)    Nutrition Recommendations/Plan:   D/c diet, NPO until SLP able to eval for swallow    Place NGT and initiate TF of Jevity 1.5 at 20ml/hr  Advance by 10ml q8hr to goal of 55ml/hr   Flush with 210ml q4hrs    Provides 1980kcals, 84g pro, 2263ml fluid (Meets 100% EENs, 133% protein, and 100% fluid needs)  *D5 (125ml/hr) providing 510kcals/d (? if running currently)    Patient is at [address] [place] [address] for refeeding syndrome per NICE criteria:    Or patient has two or more of the following:  · BMI less than 18.5 kg/m2   · unintentional weight loss greater than 10% within the last 36 months   · little or no nutritional intake for more than 5 days   · a history of alcohol abuse or drugs including insulin, chemotherapy, antacids or diuretics. ·   Please consider the following:   Initiate TF/TPN at a max of 10 kcal/kg/day, increasing levels slowly to meet or exceed full needs by 47 days    Using only 5 kcal/kg/day in extreme cases (for example, BMI less than 14 kg/m2 or negligible intake for more than 15 days) and monitoring cardiac rhythm continually in these people and any others who already have or develop any cardiac arrythmias    Closely monitor hemodynamics, volume status, and fluid balance.  Provide immediately before and during the first 10 days of feeding:   o Vitamin B1, 200300 mg daily  o Vitamin B complex, strong 1 or 2 tablets, TID(or full dose daily intravenous vitamin B preparation, if necessary)   o MVI with trace minerals,daily    Provide PO, enteral, or IV K+ (24 mmol/kg/day), Phos (0.30.6 mmol/kg/day), and Mg (0.2 mmol/kg/day IV, 0.4 mmol/kg/day PO) unless pre-feeding plasma levels are high. Pre-feeding correction of low plasma levels is unnecessary.      [employer] for [address] (2006) Nutrition support for adults: oral nutrition support, enteral tube feeding and parenteral nutrition (Tylor Das). Available at: [email or web address] [Accessed 03/27/21]    Please document initiation of TF, TF rate, flushes, GRV, and BMs  Update measured body weight    Nutrition Assessment:  Admitted for dehydration and poor PO, +JAE, likely UTI. RD consulted for poor PO, pt inappropriate for interview d/t non-verbal dementia. Spoke to RN who endorsed pt took no PO today. RD discussed need for NGT at this time d/t poor PO pta, pt current dementia and baseline low BW indicators of chronic malnutrition. Pt at high risk for refeeding at this time, rec close monitoring of electrolytes and slow advancement of feeding to goal. Will f/u for tolerance and need for adjustments to interventions. Labs: Na 155m k+ 5.9, Cl 125, CO2 17, , Cr 6.44, Corrected Ca 9.56, LFT elevated (potentially from dehydration), Phos 7.8, Mg 5.6. Meds: D5 +NS at 125ml/hr, LR, Amiodarone, ceftriaxone,  PRN antiemetics, PRN miralax. Malnutrition Assessment:  Malnutrition Status:  Insufficient data(Suspect severe malnutrition)    Context:  Acute illness     Findings of the 6 clinical characteristics of malnutrition:   Energy Intake:  7 - 50% or less of est energy requirements for 5 or more days(per H&P)  Weight Loss:  Unable to assess     Body Fat Loss:  Unable to assess,     Muscle Mass Loss:  Unable to assess,    Fluid Accumulation:  No significant fluid accumulation,    Estimated Daily Nutrient Needs:  Energy (kcal): 1985kcals (35kcals/kg); Weight Used for Energy Requirements: Current  Protein (g): 68g (1.2g/kg); Weight Used for Protein Requirements: Current  Fluid (ml/day): 2268ml (40ml/kg while dehydrated); Method Used for Fluid Requirements: ml/kg    Nutrition Related Findings:  Unable to complete NFPE at this time, will f/u as appropriate. Unknown hx dysphagia. No N/V/D/C, last BM pta. No edema.       Wounds:    None       Current Nutrition Therapies:  DIET TUBE FEEDING Jevity 1.5 20ml/hr, advance by 10ml q8hr to goal of 55ml/hr; Flush with 210ml q4hrs  DIET NPO  Current Tube Feeding (TF) Orders:   · Goal TF & Flush Orders Provides: Rec'd NGT placement and initiate TF of Jevity 1.5 at goal of 55ml/hr, Flush with 210ml q4hrs; Provides 1980kcals, 84g pro, 2263ml fluid    Anthropometric Measures:  · Height:  6' 2.02\" (188 cm)  · Current Body Wt:  56.7 kg (125 lb)(3/26)   · Admission Body Wt:  125 lb(3/26)    · Usual Body Wt:  (angle)     · Ideal Body Wt:  190 lbs:  65.8 %   · BMI Category:  Underweight (BMI less than 18.5)     No wt hx in EMR to assess, pt unable to give wt hx as well. Nutrition Diagnosis:   · Severe malnutrition related to inadequate protein-energy intake as evidenced by poor intake prior to admission, lab values(indicative of dehydration)      Nutrition Interventions:   Food and/or Nutrient Delivery: Start tube feeding  Nutrition Education and Counseling: No recommendations at this time, Education not appropriate  Coordination of Nutrition Care: Continue to monitor while inpatient, Swallow evaluation    Goals:  intakes >/=50% of EENs in 5 days, wt maintenance while acutely ill within +/-0.5kg in 5 days, resume(?) ability to consume PO in7 days       Nutrition Monitoring and Evaluation:   Behavioral-Environmental Outcomes: None identified  Food/Nutrient Intake Outcomes: Enteral nutrition intake/tolerance, Diet advancement/tolerance  Physical Signs/Symptoms Outcomes: Biochemical data, Fluid status or edema, Meal time behavior, Weight    Discharge Planning:     Too soon to determine     Electronically signed by Jeanette Palacios RD on 3/27/2021 at 5:13 PM    Contact: Ext [phone], or via [employer]

## 2021-03-27 NOTE — ED PROVIDER NOTES
EMERGENCY DEPARTMENT HISTORY AND PHYSICAL EXAM      Date: 3/26/2021  Patient Name: Arianna Calero    History of Presenting Illness     Chief Complaint   Patient presents with    Lethargy    Weight Loss       History Provided By: Patient    HPI: Arianna Calero, 66 y.o. male with PMHx as noted below presents the emergency department for evaluation of decreased p.o. intake. History is limited secondary to reported dementia and acuity of condition. Patient had been diagnosed with Covid and reportedly has not been drinking or eating for the last several days so was sent here for evaluation. No further history is available on arrival.      PCP: Evonne Mckinnon MD    Current Facility-Administered Medications   Medication Dose Route Frequency Provider Last Rate Last Admin    lactated Ringers infusion 1,000 mL  1,000 mL IntraVENous CONTINUOUS Daina Vila MD   Stopped at 03/27/21 0536    dilTIAZem (CARDIZEM) 125 mg/125 mL (1 mg/mL) dextrose 5% infusion  0-15 mg/hr IntraVENous TITRATE Daina Vila MD 15 mL/hr at 03/27/21 0410 15 mg/hr at 03/27/21 0410    lactated Ringers infusion  125 mL/hr IntraVENous CONTINUOUS Daina Vila  mL/hr at 03/27/21 0346 125 mL/hr at 03/27/21 0346    lactated Ringers infusion 1,000 mL  1,000 mL IntraVENous CONTINUOUS Daina Vila MD   Stopped at 03/27/21 0535    lactated Ringers infusion 1,000 mL  1,000 mL IntraVENous CONTINUOUS Daina Vila MD   1,000 mL at 03/26/21 2317       Past History     Past Medical History:  Past Medical History:   Diagnosis Date    Anemia     BPH (benign prostatic hyperplasia)     CKD (chronic kidney disease)     [facility]     Dementia [facility]     DVT (deep venous thrombosis) ([facility]     GERD (gastroesophageal reflux disease)     Glaucoma     Hyperlipemia        Past Surgical History:  No past surgical history on file. Family History:  No family history on file.     Social History:  Social History     Tobacco Use    Smoking status: Unknown If Ever Smoked   Substance Use Topics    Alcohol use: Not Currently    Drug use: Not Currently       Allergies: Allergies   Allergen Reactions    Flonase [Fluticasone] Unknown (comments)         Review of Systems   Review of Systems   Unable to perform ROS: Mental status change           Physical Exam   Physical Exam    GENERAL: alert, confused, cachectic appearing  EYES: PEERL, No injection, discharge or icterus. ENT: Mucous membranes dry  NECK: Supple  LUNGS: Airway patent. Non-labored respirations. Breath sounds clear with good air entry bilaterally. HEART: Tachycardic, irregularly irregular rhythm. . No peripheral edema  ABDOMEN: Non-distended and non-tender, without guarding or rebound. SKIN:  warm, dry  EXTREMITIES: Without swelling, tenderness or deformity, symmetric with normal ROM  NEUROLOGICAL: Alert, will follow intermittent commands      Diagnostic Study Results     Labs -     Recent Results (from the past 12 hour(s))   CBC WITH AUTOMATED DIFF    Collection Time: 03/26/21  8:45 PM   Result Value Ref Range    WBC 11.9 (H) 4.1 - 11.1 K/uL    RBC 5.08 4.10 - 5.70 M/uL    HGB 14.2 12.1 - 17.0 g/dL    HCT 46.4 36.6 - 50.3 %    MCV 91.3 80.0 - 99.0 FL    MCH 28.0 26.0 - 34.0 PG    MCHC 30.6 30.0 - 36.5 g/dL    RDW 20.1 (H) 11.5 - 14.5 %    PLATELET 152 609 - 373 K/uL    MPV 11.8 8.9 - 12.9 FL    NEUTROPHILS 91 (H) 32 - 75 %    LYMPHOCYTES 6 (L) 12 - 49 %    MONOCYTES 3 (L) 5 - 13 %    EOSINOPHILS 0 0 - 7 %    BASOPHILS 0 0 - 1 %    IMMATURE GRANULOCYTES 0 0.0 - 0.5 %    ABS. NEUTROPHILS 10.8 (H) 1.8 - 8.0 K/UL    ABS. LYMPHOCYTES 0.8 0.8 - 3.5 K/UL    ABS. MONOCYTES 0.4 0.0 - 1.0 K/UL    ABS. EOSINOPHILS 0.0 0.0 - 0.4 K/UL    ABS. BASOPHILS 0.0 0.0 - 0.1 K/UL    ABS. IMM.  GRANS. 0.0 0.00 - 0.04 K/UL    DF AUTOMATED     METABOLIC PANEL, COMPREHENSIVE    Collection Time: 03/26/21  8:45 PM   Result Value Ref Range    Sodium 154 (H) 136 - 145 mmol/L    Potassium 5.6 (H) 3.5 - 5.1 mmol/L    Chloride 116 (H) 97 - 108 mmol/L    CO2 18 (L) 21 - 32 mmol/L    Anion gap 20 (H) 5 - 15 mmol/L    Glucose 119 (H) 65 - 100 mg/dL     (H) 6 - 20 mg/dL    Creatinine 8.18 (H) 0.70 - 1.30 mg/dL    BUN/Creatinine ratio 25 (H) 12 - 20      GFR est AA 8 (L) >60 ml/min/1.73m2    GFR est non-AA 6 (L) >60 ml/min/1.73m2    Calcium 9.6 8.5 - 10.1 mg/dL    Bilirubin, total 0.6 0.2 - 1.0 mg/dL    AST (SGOT) 97 (H) 15 - 37 U/L    ALT (SGPT) 90 (H) 12 - 78 U/L    Alk.  phosphatase 131 (H) 45 - 117 U/L    Protein, total 8.6 (H) 6.4 - 8.2 g/dL    Albumin 3.3 (L) 3.5 - 5.0 g/dL    Globulin 5.3 (H) 2.0 - 4.0 g/dL    A-G Ratio 0.6 (L) 1.1 - 2.2     LACTIC ACID    Collection Time: 03/26/21  8:45 PM   Result Value Ref Range    Lactic acid 1.8 0.4 - 2.0 mmol/L   TSH 3RD GENERATION    Collection Time: 03/26/21  8:45 PM   Result Value Ref Range    TSH 1.43 0.36 - 3.74 uIU/mL   MAGNESIUM    Collection Time: 03/26/21  8:45 PM   Result Value Ref Range    Magnesium 5.6 (H) 1.6 - 2.4 mg/dL   CK    Collection Time: 03/26/21  8:45 PM   Result Value Ref Range     (H) 39 - 308 U/L   TROPONIN I    Collection Time: 03/26/21  8:45 PM   Result Value Ref Range    Troponin-I, Qt. 0.57 (H) <0.05 ng/mL   URINALYSIS W/ RFLX MICROSCOPIC    Collection Time: 03/27/21  1:55 AM   Result Value Ref Range    Color Yellow      Appearance Turbid (A) Clear      Specific gravity 1.019 1.003 - 1.030      pH (UA) 5.0 5.0 - 8.0      Protein 100 (A) Negative mg/dL    Glucose Negative Negative mg/dL    Ketone Negative Negative mg/dL    Bilirubin Negative Negative      Blood Large (A) Negative      Urobilinogen 0.1 0.1 - 1.0 EU/dL    Nitrites Negative Negative      Leukocyte Esterase Large (A) Negative      WBC 20-50 0 - 4 /hpf    RBC 20-50 0 - 5 /hpf    Bacteria Negative Negative /hpf    Hyaline cast >20 (H) 0 - 5 /lpf    Mucus 2+ /lpf    Granular cast 4 /lpf   URINE MICROSCOPIC    Collection Time: 03/27/21 1:55 AM   Result Value Ref Range    WBC 20-50 0 - 4 /hpf    RBC 20-50 0 - 5 /hpf    Bacteria Negative Negative /hpf       Radiologic Studies -   CT ABD PELV WO CONT   Final Result   No acute abdominopelvic abnormality. Nonobstructing bilateral renal   calculi. Correlate for constipation given increased stool burden. End-stage   degenerative changes of the hips with postoperative change in the right   acetabulum. Pulmonary interstitial abnormality partially visualized in the acute   setting would suggest infection/inflammation or edema versus a combination of   these and in any case with early airspace component at the right lung base. CT HEAD WO CONT   Final Result   No acute intracranial abnormality. 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 Mild   generalized atrophy with nonspecific white matter abnormality that may indicate   chronic small vessel disease. 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 XR CHEST PORT   Final Result   Rotated exam. Diffuse interstitial abnormality and with question of   some superimposed nodular areas. In the acute setting edema, infection or   combination of these is considered however, especially given the possible   nodular opacities, follow-up to radiographic resolution and/or further   evaluation with chest CT if symptoms/findings do not resolve as expected with   treatment, or if indicated otherwise. CT Results  (Last 48 hours)               03/26/21 2306  CT ABD PELV WO CONT Final result    Impression:  No acute abdominopelvic abnormality. Nonobstructing bilateral renal   calculi. Correlate for constipation given increased stool burden. End-stage   degenerative changes of the hips with postoperative change in the right   acetabulum.  Pulmonary interstitial abnormality partially visualized in the acute   setting would suggest infection/inflammation or edema versus a combination of   these and in any case with early airspace component at the right lung base. Narrative:  HISTORY:   abd pain     Dose reduction technique: All CT scans at this facility are performed using dose reduction optimization   technique as appropriate on the exam including the following: Automated exposure   control, adjustment of the MA and/or KV according to patient size and/or use of   iterative reconstructive technique. TECHNIQUE: CT of the abdomen and pelvis without contrast   COMPARISON: None   LIMITATIONS: None       CHEST: Diffuse interstitial marking prominence characterized as groundglass and   septal thickening throughout the visualized and with some confluence at the   right lung base        LIVER: Normal.        GALLBLADDER: Normal.        BILIARY TREE: Normal.           PANCREAS: Normal.            SPLEEN: Normal.           ADRENAL GLANDS: Normal.       KIDNEYS/URETERS/BLADDER: Negative for acute abnormality. The bladder is   catheterized and collapsed. Bilateral nonobstructing renal calculi are present   with the largest stone measuring 4 mm in the right kidney. Ureters unremarkable. RETROPERITONEUM/AORTA: Normal.      BOWEL/MESENTERY: Moderate amount of stool throughout the length of the colon but   no evidence of a bowel obstruction, perforation or abdominal pelvic abscess,   within the limits of a noncontrast CT. Rowena Eaton APPENDIX: Identified and normal.         PERITONEAL CAVITY: Normal.          REPRODUCTIVE ORGANS: Normal.           BONE/TISSUES: Advanced end-stage degenerative changes of the hips with deformity   of the right femoral head and previous acetabular ORIF. Degenerative changes of   the spine. OTHER: None. 03/26/21 2306  CT HEAD WO CONT Final result    Impression:  No acute intracranial abnormality. 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  Mild   generalized atrophy with nonspecific white matter abnormality that may indicate   chronic small vessel disease. 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 Narrative:  HISTORY:  aMS   Dose reduction technique: All CT scans at this facility are performed using dose reduction optimization   technique as appropriate on the exam including the following: Automated exposure   control, adjustment of the MA and/or KV according to patient size and/or use of   iterative reconstructive technique. TECHNIQUE: [BRAIN CT without contrast]   COMPARISON: [None]   LIMITATIONS: [None]       BRAIN: Mild patchy areas of subcortical and periventricular white matter   hypodensity. Encephalomalacia in the left parietal cortex with focal   hypodensities in the bilateral cerebellar hemispheres suggesting small prior   infarcts. No acute parenchymal hemorrhage, mass effect or midline shift. VENTRICLES: Mild prominence of the cerebral ventricles commensurate with the   degree of atrophy. EXTRA-AXIAL SPACES/SULCI: Mild generalized cerebral/cerebellar atrophy. No   extra-axial hemorrhage, fluid collection or mass. CALVARIUM/SKULL BASE: Normal       FACE/SINUSES: Visualized portions normal       SOFT TISSUES: Fatty mass overlying the right occipital bones present and   measures 3.0 x 5.7 x 2.5 cm       OTHER: None                CXR Results  (Last 48 hours)               03/26/21 2251  XR CHEST PORT Final result    Impression:  Rotated exam. Diffuse interstitial abnormality and with question of   some superimposed nodular areas. In the acute setting edema, infection or   combination of these is considered however, especially given the possible   nodular opacities, follow-up to radiographic resolution and/or further   evaluation with chest CT if symptoms/findings do not resolve as expected with   treatment, or if indicated otherwise.             Narrative:  HISTORY:  AMS       TECHNIQUE:  AP chest radiograph       COMPARISON: None LIMITATIONS: Rotated to the right       TUBES/LINES: None       LUNG PARENCHYMA: Diffuse interstitial marking prominence possibly with some   areas of confluence at the right lower lobe and with questionable nodular   opacities including projected in the right upper lobe near the anterior aspect   of the right first rib and a nodular opacity projected near the tip of the left   scapula   TRACHEA/BRONCHI: Normal   PULMONARY VESSELS: Normal   PLEURA: Normal   HEART: Normal   AORTIC SHADOW:Normal.     MEDIASTINUM: Normal   BONE/SOFT TISSUES: No acute abnormality. OTHER: None                   Medical Decision Making     ICelia MD am the first provider for this patient and am the attending of record for this patient encounter. I reviewed the vital signs, available nursing notes, past medical history, past surgical history, family history and social history. Vital Signs-Reviewed the patient's vital signs. Patient Vitals for the past 12 hrs:   Temp Pulse Resp BP SpO2   03/27/21 0645 97.2 °F (36.2 °C) (!) 110 20 (!) 103/59 99 %   03/27/21 0533 97.9 °F (36.6 °C) (!) 114 24 (!) 104/56 97 %   03/27/21 0154 98.1 °F (36.7 °C) (!) 129 27 101/60 97 %   03/27/21 0145  (!) 159  105/78    03/27/21 0129 97.7 °F (36.5 °C) (!) 133 25 111/75 97 %   03/26/21 2319 96.8 °F (36 °C) (!) 120 15 111/61 100 %   03/26/21 2118 (!) 94.8 °F (34.9 °C) (!) 131 (!) 32 94/68 97 %   03/26/21 2112 (!) 92.5 °F (33.6 °C)       03/26/21 2045     92 %   03/26/21 2036  (!) 150 (!) 45 (!) 84/24 97 %           EKG interpretation: (Preliminary)  Rhythm: Atrial fibrillation with rapid ventricular response. Rate (approx.): 147; Axis: normal; QRS interval: normal ; ST/T wave: ST depressions was interpreted by Oscar Rodriguez MD,ED Provider. Records Reviewed: Nursing Notes and Old Medical Records    Provider Notes (Medical Decision Making):   75-year-old male ill-appearing presents with decreased p.o. intake.   On arrival is in a fibrillation with rapid ventricular response. Differential was broad and included sepsis, hypovolemic shock, electrolyte abnormalities, renal failure among others. History of work-up most consistent with profound hypovolemia and so was given IV fluids with eventual improvement in his blood pressure and urine output. Was also started on a diltiazem drip for his rapid A. fib with improvement. Patient was more alert and responsive on reassessment. Have also started on broad-spectrum antibiotics with cultures pending. We will plan to admit for further management. ED Course:          PROGRESS:  The patient has been re-evaluated and sx have improved. Admit Note  Patient is being admitted to Dr. Brynn Faust The results of their tests and reasons for their admission have been discussed with them and/or available family. They convey agreement and understanding for the need to be admitted and for their admission diagnosis. Consultation has been made with the inpatient physician specialist for hospitalization. Critical Care Note      IMPENDING DETERIORATION -Cardiovascular, CNS, Metabolic, Renal and Hepatic  ASSOCIATED RISK FACTORS - Hypotension, Metabolic changes and Dehydration  MANAGEMENT- Bedside Assessment and Supervision of Care  INTERPRETATION -  CT Scan, ECG and Blood Pressure  INTERVENTIONS - hemodynamic mngmt  CASE REVIEW - Hospitalist/Intensivist  TREATMENT RESPONSE -Improved  PERFORMED BY - Self    NOTES   :  I have provided a total of 43 minutes of critical time not including time spent on separately documented procedures. The reason for providing this level of medical care for this critically ill patient was due to a critical illness that impaired one or more vital organ systems such that there was a high probability of imminent or life threatening deterioration in the patients condition.  This care involved high complexity decision making to assess, manipulate, and support vital system functions,  lab review, consultations with specialist, family decision- making, bedside attention and documentation. During this entire length of time I was immediately available to the patient    Disposition:  admission    PLAN:  1. Admit     Diagnosis     Clinical Impression:   1. Hypovolemia    2. Atrial fibrillation, rapid ([address]    3. Acute renal failure, unspecified acute renal failure type [facility])        Please note that this dictation was completed with Dragon, computer voice recognition software. Quite often unanticipated grammatical, syntax, homophones, and other interpretive errors are inadvertently transcribed by the computer software. Please disregard these errors. Additionally, please excuse any errors that have escaped final proofreading.

## 2021-03-27 NOTE — CONSULTS
Nephrology Consult    Patient: Philomena Crisostomo MRN: [id number]  SSN: [id number]    YOB: 1945  Age: 66 y.o. Sex: male      Subjective:     Reason for the consultation. Acute kidney injury/hyperkalemia/hypernatremia  History of present illness. The patient is 52-year-old Afro-American man with underlying history of dementia, old CVA, history of DVT, BPH was admitted with altered mental status, CT head no acute process, elevated BUN/creatinine of 207/8.1, unknown baseline kidney functions, new onset atrial fibrillation, on IV heparin drip, CT abdomen no hydronephrosis, Vazquez catheter in place and making urine. Past Medical History:   Diagnosis Date    Anemia     BPH (benign prostatic hyperplasia)     CKD (chronic kidney disease)     [facility]     Dementia ([facility]     DVT (deep venous thrombosis) (HCC)     GERD (gastroesophageal reflux disease)     Glaucoma     Hyperlipemia      No past surgical history on file. No family history on file.   Social History     Tobacco Use    Smoking status: Unknown If Ever Smoked   Substance Use Topics    Alcohol use: Not Currently      Current Facility-Administered Medications   Medication Dose Route Frequency Provider Last Rate Last Admin    lactated Ringers infusion 1,000 mL  1,000 mL IntraVENous CONTINUOUS Ezio Moon MD   Stopped at 03/27/21 0536    lactated Ringers infusion  125 mL/hr IntraVENous CONTINUOUS Carlene Moon  mL/hr at 03/27/21 0346 125 mL/hr at 03/27/21 0346    lactated Ringers infusion 1,000 mL  1,000 mL IntraVENous CONTINUOUS Carlene Moon MD   Stopped at 03/27/21 0535    acetaminophen (TYLENOL) tablet 650 mg  650 mg Oral Q6H PRN Carlene Moon MD        Or   Fly Sit acetaminophen (TYLENOL) suppository 650 mg  650 mg Rectal Q6H PRN Ezio Moon MD        polyethylene glycol (MIRALAX) packet 17 g  17 g Oral DAILY PRN Ezio Moon MD        ondansetron (ZOFRAN ODT) tablet 4 mg  4 mg Oral Q8H PRN Mohiuddin, Dennis Gilford, MD        Or    ondansetron [facility]) injection 4 mg  4 mg IntraVENous Q6H PRN Mohiuddin, Dennis Gilford, MD        amiodarone (CORDARONE) 150 mg in dextrose 5% 100 mL bolus infusion  150 mg IntraVENous ONCE Julian Maciel MD        heparin 25,000 units in D5W 250 ml infusion  12-25 Units/kg/hr IntraVENous TITRATE Julian Maciel MD        amiodarone (CORDARONE) 375 mg in dextrose 5% 250 mL infusion  0.5-1 mg/min IntraVENous TITRATE Julian Maciel MD        heparin (porcine) 1,000 unit/mL injection 3,400 Units  60 Units/kg IntraVENous PRN Julian Maciel MD        Or    heparin (porcine) 1,000 unit/mL injection 1,700 Units  30 Units/kg IntraVENous PRN Julian Maciel MD        dextrose 5 % - 0.2% NaCl infusion  150 mL/hr IntraVENous CONTINUOUS Bowen Salas MD        calcium gluconate 1 gram in sodium chloride (ISO-OSM) 50 mL infusion  1 g IntraVENous Erica Whatley MD        sodium polystyrene (KAYEXALATE) 15 gram/60 mL oral suspension 30 g  30 g Oral NOW Bowen Salas MD        cefTRIAXone (ROCEPHIN) 1 g in sterile water (preservative free) 10 mL IV syringe  1 g IntraVENous Q24H Bowen Salas MD        lactated Ringers infusion 1,000 mL  1,000 mL IntraVENous CONTINUOUS Mohiuddin, Dennis Gilford, MD   1,000 mL at 03/26/21 2317        Allergies   Allergen Reactions    Flonase [Fluticasone] Unknown (comments)       Review of Systems:  Unable to obtain    Objective:     Vitals:    03/27/21 0533 03/27/21 0645 03/27/21 0754 03/27/21 1014   BP: (!) 104/56 (!) 103/59 (!) 114/58 103/63   Pulse: (!) 114 (!) 110 (!) 122 (!) 117   Resp: 24 20 30 24   Temp: 97.9 °F (36.6 °C) 97.2 °F (36.2 °C) 97.5 °F (36.4 °C) 97.4 °F (36.3 °C)   SpO2: 97% 99% 96% 94%   Weight:       Height:            Physical Exam:  General: AMS  Eyes: sclera anicteric  Oral Cavity: No thrush or ulcers  Neck: no JVD  Chest: Fair bilateral air entry  Heart: normal sounds  Abdomen: soft and non tender :  Vazquez+  Lower Extremities: no edema  Skin: no rash  Neuro: ams  Psychiatric: non-depressed            Assessment:     Hospital Problems  Never Reviewed          Codes Class Noted POA    Hypovolemia ICD-10-CM: E86.1  ICD-9-CM: 276.52  3/27/2021 Unknown        Acute renal failure (ARF) ([facility] ICD-10-CM: N17.9  ICD-9-CM: 584.9  3/27/2021 Unknown        Atrial fibrillation, rapid [facility]) ICD-10-CM: I48.91  ICD-9-CM: 427.31  3/27/2021 Unknown        JAE (acute kidney injury) ([facility] ICD-10-CM: N17.9  ICD-9-CM: 584.9  3/27/2021 Unknown              Plan:     1 acute kidney injury:   -Etiology is likely prerenal azotemia from volume depletion.    -On admission BUN/creatinine 207/8.1, has improved down to 186/6.7.  -Received 3 L fluid boluses in the ER.    -CT abdomen no evidence of hydronephrosis.    -Has Vazquez catheter in place and making urine.    -No emergent indication for dialysis. -I will increase maintenance IV fluids. 2.  Hypernatremia.    -From free water deficit.    -Sodium 158.    -I will put him on hypotonic IV fluids. 3.  Hyperkalemia. -Due to #1.    -Potassium 5.8.    -I will give Kayexalate 30 g x 1 dose. 4.  Hypermagnesemia.    -Due to #1.    -Magnesium 5.6.    -IV calcium gluconate. IV fluids. 5.  Atrial fibrillation.    -Cardiology on board. -Getting started on amiodarone drip.    -On IV heparin drip. 6.  Altered mental status.    -Could be metabolic encephalopathy/UTI. -CT head no acute process. -Urinalysis consistent with UTI. - I will put on IV ceftriaxone.    -Could be uremia also. Thank you for the consultation.     Signed By: Trina Montes MD     March 27, 2021

## 2021-03-28 ENCOUNTER — APPOINTMENT (OUTPATIENT)
Dept: GENERAL RADIOLOGY | Age: 76
DRG: 682 | End: 2021-03-28
Attending: FAMILY MEDICINE
Payer: MEDICARE

## 2021-03-28 LAB
ALBUMIN SERPL-MCNC: 2.4 G/DL (ref 3.5–5)
ALBUMIN SERPL-MCNC: 2.5 G/DL (ref 3.5–5)
ALBUMIN/GLOB SERPL: 0.6 {RATIO} (ref 1.1–2.2)
ALP SERPL-CCNC: 105 U/L (ref 45–117)
ALT SERPL-CCNC: 58 U/L (ref 12–78)
ANION GAP SERPL CALC-SCNC: 9 MMOL/L (ref 5–15)
ANION GAP SERPL CALC-SCNC: 9 MMOL/L (ref 5–15)
APTT PPP: 100.3 SEC (ref 23–35.7)
APTT PPP: 54.2 SEC (ref 23–35.7)
APTT PPP: 94.6 SEC (ref 23–35.7)
AST SERPL W P-5'-P-CCNC: 78 U/L (ref 15–37)
BASOPHILS # BLD: 0 K/UL (ref 0–0.1)
BASOPHILS # BLD: 0 K/UL (ref 0–0.1)
BASOPHILS NFR BLD: 0 % (ref 0–1)
BASOPHILS NFR BLD: 0 % (ref 0–1)
BILIRUB SERPL-MCNC: 0.6 MG/DL (ref 0.2–1)
BUN SERPL-MCNC: 125 MG/DL (ref 6–20)
BUN SERPL-MCNC: 127 MG/DL (ref 6–20)
BUN/CREAT SERPL: 30 (ref 12–20)
BUN/CREAT SERPL: 30 (ref 12–20)
CA-I BLD-MCNC: 8.7 MG/DL (ref 8.5–10.1)
CA-I BLD-MCNC: 8.9 MG/DL (ref 8.5–10.1)
CHLORIDE SERPL-SCNC: 124 MMOL/L (ref 97–108)
CHLORIDE SERPL-SCNC: 125 MMOL/L (ref 97–108)
CO2 SERPL-SCNC: 22 MMOL/L (ref 21–32)
CO2 SERPL-SCNC: 23 MMOL/L (ref 21–32)
CREAT SERPL-MCNC: 4.19 MG/DL (ref 0.7–1.3)
CREAT SERPL-MCNC: 4.22 MG/DL (ref 0.7–1.3)
DIFFERENTIAL METHOD BLD: ABNORMAL
DIFFERENTIAL METHOD BLD: ABNORMAL
EOSINOPHIL # BLD: 0.1 K/UL (ref 0–0.4)
EOSINOPHIL # BLD: 0.1 K/UL (ref 0–0.4)
EOSINOPHIL NFR BLD: 1 % (ref 0–7)
EOSINOPHIL NFR BLD: 2 % (ref 0–7)
ERYTHROCYTE [DISTWIDTH] IN BLOOD BY AUTOMATED COUNT: 21 % (ref 11.5–14.5)
ERYTHROCYTE [DISTWIDTH] IN BLOOD BY AUTOMATED COUNT: 21.1 % (ref 11.5–14.5)
GLOBULIN SER CALC-MCNC: 4.1 G/DL (ref 2–4)
GLUCOSE SERPL-MCNC: 165 MG/DL (ref 65–100)
GLUCOSE SERPL-MCNC: 166 MG/DL (ref 65–100)
HCT VFR BLD AUTO: 35.7 % (ref 36.6–50.3)
HCT VFR BLD AUTO: 35.7 % (ref 36.6–50.3)
HGB BLD-MCNC: 10.5 G/DL (ref 12.1–17)
HGB BLD-MCNC: 10.5 G/DL (ref 12.1–17)
IMM GRANULOCYTES # BLD AUTO: 0 K/UL (ref 0–0.04)
IMM GRANULOCYTES # BLD AUTO: 0 K/UL (ref 0–0.04)
IMM GRANULOCYTES NFR BLD AUTO: 0 % (ref 0–0.5)
IMM GRANULOCYTES NFR BLD AUTO: 0 % (ref 0–0.5)
LYMPHOCYTES # BLD: 0.3 K/UL (ref 0.8–3.5)
LYMPHOCYTES # BLD: 0.5 K/UL (ref 0.8–3.5)
LYMPHOCYTES NFR BLD: 4 % (ref 12–49)
LYMPHOCYTES NFR BLD: 8 % (ref 12–49)
MCH RBC QN AUTO: 27.6 PG (ref 26–34)
MCH RBC QN AUTO: 27.6 PG (ref 26–34)
MCHC RBC AUTO-ENTMCNC: 29.4 G/DL (ref 30–36.5)
MCHC RBC AUTO-ENTMCNC: 29.4 G/DL (ref 30–36.5)
MCV RBC AUTO: 93.7 FL (ref 80–99)
MCV RBC AUTO: 93.7 FL (ref 80–99)
MONOCYTES # BLD: 0.2 K/UL (ref 0–1)
MONOCYTES # BLD: 0.4 K/UL (ref 0–1)
MONOCYTES NFR BLD: 4 % (ref 5–13)
MONOCYTES NFR BLD: 5 % (ref 5–13)
NEUTS SEG # BLD: 5.5 K/UL (ref 1.8–8)
NEUTS SEG # BLD: 6.4 K/UL (ref 1.8–8)
NEUTS SEG NFR BLD: 86 % (ref 32–75)
NEUTS SEG NFR BLD: 90 % (ref 32–75)
NRBC # BLD: 0 K/UL (ref 0–0.01)
NRBC BLD-RTO: 0 PER 100 WBC
PHOSPHATE SERPL-MCNC: 4.6 MG/DL (ref 2.6–4.7)
PLATELET # BLD AUTO: 103 K/UL (ref 150–400)
PLATELET # BLD AUTO: 136 K/UL (ref 150–400)
PMV BLD AUTO: 10.6 FL (ref 8.9–12.9)
PMV BLD AUTO: 11.6 FL (ref 8.9–12.9)
POTASSIUM SERPL-SCNC: 4.2 MMOL/L (ref 3.5–5.1)
POTASSIUM SERPL-SCNC: 4.2 MMOL/L (ref 3.5–5.1)
PROT SERPL-MCNC: 6.5 G/DL (ref 6.4–8.2)
RBC # BLD AUTO: 3.81 M/UL (ref 4.1–5.7)
RBC # BLD AUTO: 3.81 M/UL (ref 4.1–5.7)
SODIUM SERPL-SCNC: 155 MMOL/L (ref 136–145)
SODIUM SERPL-SCNC: 157 MMOL/L (ref 136–145)
THERAPEUTIC RANGE,PTTT: ABNORMAL SEC (ref 68–109)
WBC # BLD AUTO: 6.3 K/UL (ref 4.1–11.1)
WBC # BLD AUTO: 7.1 K/UL (ref 4.1–11.1)

## 2021-03-28 PROCEDURE — 74011250636 HC RX REV CODE- 250/636: Performed by: INTERNAL MEDICINE

## 2021-03-28 PROCEDURE — 85730 THROMBOPLASTIN TIME PARTIAL: CPT

## 2021-03-28 PROCEDURE — 71045 X-RAY EXAM CHEST 1 VIEW: CPT

## 2021-03-28 PROCEDURE — 74011250637 HC RX REV CODE- 250/637: Performed by: INTERNAL MEDICINE

## 2021-03-28 PROCEDURE — 74011250637 HC RX REV CODE- 250/637: Performed by: FAMILY MEDICINE

## 2021-03-28 PROCEDURE — 85025 COMPLETE CBC W/AUTO DIFF WBC: CPT

## 2021-03-28 PROCEDURE — 80053 COMPREHEN METABOLIC PANEL: CPT

## 2021-03-28 PROCEDURE — 74011000250 HC RX REV CODE- 250: Performed by: INTERNAL MEDICINE

## 2021-03-28 PROCEDURE — 74011000258 HC RX REV CODE- 258: Performed by: INTERNAL MEDICINE

## 2021-03-28 PROCEDURE — 80069 RENAL FUNCTION PANEL: CPT

## 2021-03-28 PROCEDURE — 65270000029 HC RM PRIVATE

## 2021-03-28 RX ORDER — AMIODARONE HYDROCHLORIDE 200 MG/1
400 TABLET ORAL 2 TIMES DAILY
Status: DISCONTINUED | OUTPATIENT
Start: 2021-03-28 | End: 2021-04-03

## 2021-03-28 RX ADMIN — HEPARIN SODIUM 18 UNITS/KG/HR: 10000 INJECTION, SOLUTION INTRAVENOUS at 03:58

## 2021-03-28 RX ADMIN — AMIODARONE HYDROCHLORIDE 0.5 MG/MIN: 50 INJECTION, SOLUTION INTRAVENOUS at 01:45

## 2021-03-28 RX ADMIN — DEXTROSE AND SODIUM CHLORIDE 150 ML/HR: 5; 200 INJECTION, SOLUTION INTRAVENOUS at 15:02

## 2021-03-28 RX ADMIN — AMIODARONE HYDROCHLORIDE 400 MG: 200 TABLET ORAL at 18:07

## 2021-03-28 RX ADMIN — ACETAMINOPHEN 650 MG: 325 TABLET, FILM COATED ORAL at 22:21

## 2021-03-28 RX ADMIN — ATORVASTATIN CALCIUM 40 MG: 40 TABLET, FILM COATED ORAL at 22:23

## 2021-03-28 RX ADMIN — HEPARIN SODIUM 1700 UNITS: 1000 INJECTION, SOLUTION INTRAVENOUS; SUBCUTANEOUS at 04:06

## 2021-03-28 RX ADMIN — ASPIRIN 325 MG ORAL TABLET 325 MG: 325 PILL ORAL at 11:24

## 2021-03-28 RX ADMIN — DEXTROSE AND SODIUM CHLORIDE 150 ML/HR: 5; 200 INJECTION, SOLUTION INTRAVENOUS at 01:51

## 2021-03-28 RX ADMIN — HEPARIN SODIUM 18 UNITS/KG/HR: 10000 INJECTION, SOLUTION INTRAVENOUS at 18:07

## 2021-03-28 RX ADMIN — CEFTRIAXONE SODIUM 1 G: 1 INJECTION, POWDER, FOR SOLUTION INTRAMUSCULAR; INTRAVENOUS at 15:05

## 2021-03-28 NOTE — WOUND CARE
Delivered Isogel pressure reduction bed with air pump to hallway outside of patient's room and informed [address]

## 2021-03-28 NOTE — PROGRESS NOTES
PT orders received, chart review completed. Attempted to complete PT eval; per RN, pt not appropriate at this time due to recent placement of NG tube, bleeding from the nose, and AMS. Will attempt when pt is medically appropriate. Thank you.

## 2021-03-28 NOTE — PROGRESS NOTES
OT orders received, chart review completed. Attempted to complete OT eval; per RN, pt not appropriate at this time due to recent placement of NG tube, bleeding from the nose, and AMS. Will attempt in near future when pt is medically appropriate. Thank you.

## 2021-03-28 NOTE — PROGRESS NOTES
Per RN, pt is not appropriate for po trials for swallow evaluation secondary to reduced level of alertness. NG-tube placed. Clinician recommends alternate source of nutrient intake. ST to f/u on 3/29/21 for appropriateness.

## 2021-03-28 NOTE — WOUND CARE
IP WOUND CONSULT Stanley Stahl MEDICAL RECORD NUMBER:  [id number] AGE: 66 y.o. GENDER: male  : 1945 TODAY'S DATE:  3/28/2021 GENERAL  
 
[] Follow-up [x] New Consult Stanley Stahl is a 66 y.o. male referred by:  
[x] Physician 
[] Nursing 
[] Other: PAST MEDICAL HISTORY Past Medical History:  
Diagnosis Date  Anemia  BPH (benign prostatic hyperplasia)  CKD (chronic kidney disease)  CVA (cerebral vascular accident) ([facility]  Dementia ([facility]  DVT (deep venous thrombosis) ([facility]  GERD (gastroesophageal reflux disease)  Glaucoma  Hyperlipemia PAST SURGICAL HISTORY No past surgical history on file. FAMILY HISTORY No family history on file. ALLERGIES Allergies Allergen Reactions  Flonase [Fluticasone] Unknown (comments) MEDICATIONS No current facility-administered medications on file prior to encounter. No current outpatient medications on file prior to encounter. [unfilled] Visit Vitals /60 (BP 1 Location: Right upper arm, BP Patient Position: At rest) Pulse 64 Temp 97.9 °F (36.6 °C) Resp 20 Ht 6' 2.02\" (1.88 m) Wt 69.1 kg (152 lb 5.4 oz) SpO2 95% BMI 19.55 kg/m² ASSESSMENT Wound Identification & Type: sDTI to right hip; MASD to gluteal cleft; and blanchable erythema to left medial knee Dressing change: Zinc paste to buttocks / gluteal cleft Verbal consent for picture: Cognitive Impairment Contributing Factors: anticoagulation therapy, chronic pressure, decreased mobility, shear force, incontinence of stool, incontinence of urine, malnutrition and Cognitive Impairment / Functional Quadriplegia Wound Penis Distal Head of penis is split with scant amount of bloody drainage 21 (Active) Number of days: 0 Wound Hip Right sDTI 21 (Active) Wound Image   21 1337 Wound Etiology Deep Tissue/Injury 21 1337 Wound Assessment Dry;Purple/maroon; Other (Comment); Non-blanchable erythema 03/28/21 1337 Drainage Amount None 03/28/21 1337 Wound Odor None 03/28/21 1337 Keren-Wound/Incision Assessment Non-Blanchable erythema; Warm;Intact 03/28/21 1337 Number of days: 0 Wound Knee Left;Medial Blanchable Erythema 03/28/21 (Active) Wound Image   03/28/21 1338 Wound Etiology Other (Comment) 03/28/21 1338 Wound Length (cm) 11 cm 03/28/21 1338 Wound Width (cm) 1 cm 03/28/21 1338 Wound Depth (cm) 0 cm 03/28/21 1338 Wound Surface Area (cm^2) 11 cm^2 03/28/21 1338 Wound Volume (cm^3) 0 cm^3 03/28/21 1338 Wound Assessment Pink/red;Erythema;Dry 03/28/21 1338 Drainage Amount None 03/28/21 1338 Wound Odor None 03/28/21 1338 Keren-Wound/Incision Assessment Dry/flaky; Intact 03/28/21 1338 Number of days: 0 Wound Gluteal fold/cleft MASD 03/28/21 (Active) Cleansed Other (Comment) 03/28/21 1341 Dressing/Treatment Zinc paste 03/28/21 1341 Wound Assessment Pink/red 03/28/21 1341 Drainage Amount None 03/28/21 1341 Wound Odor None 03/28/21 1341 Keren-Wound/Incision Assessment Dry/flaky 03/28/21 1341 Number of days: 0 PLAN Skin Care & Pressure Relief Recommendations Speciality bed Isogel pressure reduction bed with air pump Minimize layers of linen Turn/reposition approximately every 2 hours Pillow wedges Manage incontinence Promote continence; Skin Protective lotion/cream to buttocks and sacrum daily and as needed with incontinence care Offloading boots Other: Maintain pillow between BLEs Stephen 12 Blood Glucose: 76 on 3/27/21 Albumin: 2.3 on 3/27/21 WBCs: 8.9 on 3/27/21 Physician/Provider notified:  
Recommendations: Isogel pressure reduction mattress with air pump for pressure reduction; heel boots while in bed for offloading; zinc paste TID and prn for soiling to buttocks/gluteal cleft; use foam body alignment wedge to turn / reposition q2h at 30 degree angle or more; maintain pillow between BLEs to prevent skin injury; and maintain HOB at 30 degrees or less if not contraindicated. Will continue to follow. Teaching completed with:  
[] Patient          
[] Family member      
[] Caregiver         
[] Nursing 
[] Other Patient/Caregiver Teaching: 
Level of patient/caregiver understanding able to:  
[] Indicates understanding       [] Needs reinforcement 
[] Unsuccessful      [] Verbal Understanding 
[] Demonstrated understanding       [] No evidence of learning 
[] Refused teaching         [] N/A Electronically signed by Bianca Sultana RN on 3/28/2021 at 1:49 PM

## 2021-03-28 NOTE — PROGRESS NOTES
General Daily Progress Note          Patient Name:   Ryan Acuña       YOB: 1945       Age:  66 y.o. Admit Date: 3/26/2021      Subjective:       Patient resting the bed NG tube in place on heparin drip contracted leg  Sodium 151           Objective:     Visit Vitals  /60 (BP 1 Location: Right upper arm, BP Patient Position: At rest)   Pulse 64   Temp 97.9 °F (36.6 °C)   Resp 20   Ht 6' 2.02\" (1.88 m)   Wt 69.1 kg (152 lb 5.4 oz)   SpO2 95%   BMI 19.55 kg/m²        Recent Results (from the past 24 hour(s))   PTT    Collection Time: 03/27/21  5:06 PM   Result Value Ref Range    aPTT 26.1 23.0 - 35.7 sec    aPTT, therapeutic range   68 - 109 sec   CBC WITH AUTOMATED DIFF    Collection Time: 03/27/21  5:06 PM   Result Value Ref Range    WBC 8.9 4.1 - 11.1 K/uL    RBC 4.17 4.10 - 5.70 M/uL    HGB 11.5 (L) 12.1 - 17.0 g/dL    HCT 38.4 36.6 - 50.3 %    MCV 92.1 80.0 - 99.0 FL    MCH 27.6 26.0 - 34.0 PG    MCHC 29.9 (L) 30.0 - 36.5 g/dL    RDW 20.7 (H) 11.5 - 14.5 %    PLATELET 558 535 - 571 K/uL    MPV 11.6 8.9 - 12.9 FL    NEUTROPHILS 95 (H) 32 - 75 %    LYMPHOCYTES 2 (L) 12 - 49 %    MONOCYTES 3 (L) 5 - 13 %    EOSINOPHILS 0 0 - 7 %    BASOPHILS 0 0 - 1 %    IMMATURE GRANULOCYTES 0 0.0 - 0.5 %    ABS. NEUTROPHILS 8.5 (H) 1.8 - 8.0 K/UL    ABS. LYMPHOCYTES 0.2 (L) 0.8 - 3.5 K/UL    ABS. MONOCYTES 0.3 0.0 - 1.0 K/UL    ABS. EOSINOPHILS 0.0 0.0 - 0.4 K/UL    ABS. BASOPHILS 0.0 0.0 - 0.1 K/UL    ABS. IMM.  GRANS. 0.0 0.00 - 0.04 K/UL    DF AUTOMATED     PTT    Collection Time: 03/28/21  1:16 AM   Result Value Ref Range    aPTT 54.2 (H) 23.0 - 35.7 sec    aPTT, therapeutic range   68 - 118 sec   METABOLIC PANEL, COMPREHENSIVE    Collection Time: 03/28/21 10:50 AM   Result Value Ref Range    Sodium 155 (H) 136 - 145 mmol/L    Potassium 4.2 3.5 - 5.1 mmol/L    Chloride 124 (H) 97 - 108 mmol/L    CO2 22 21 - 32 mmol/L    Anion gap 9 5 - 15 mmol/L    Glucose 166 (H) 65 - 100 mg/dL     (H) 6 - 20 mg/dL    Creatinine 4.22 (H) 0.70 - 1.30 mg/dL    BUN/Creatinine ratio 30 (H) 12 - 20      GFR est AA 17 (L) >60 ml/min/1.73m2    GFR est non-AA 14 (L) >60 ml/min/1.73m2    Calcium 8.9 8.5 - 10.1 mg/dL    Bilirubin, total 0.6 0.2 - 1.0 mg/dL    AST (SGOT) 78 (H) 15 - 37 U/L    ALT (SGPT) 58 12 - 78 U/L    Alk. phosphatase 105 45 - 117 U/L    Protein, total 6.5 6.4 - 8.2 g/dL    Albumin 2.4 (L) 3.5 - 5.0 g/dL    Globulin 4.1 (H) 2.0 - 4.0 g/dL    A-G Ratio 0.6 (L) 1.1 - 2.2     CBC WITH AUTOMATED DIFF    Collection Time: 03/28/21 10:50 AM   Result Value Ref Range    WBC 7.1 4.1 - 11.1 K/uL    RBC 3.81 (L) 4.10 - 5.70 M/uL    HGB 10.5 (L) 12.1 - 17.0 g/dL    HCT 35.7 (L) 36.6 - 50.3 %    MCV 93.7 80.0 - 99.0 FL    MCH 27.6 26.0 - 34.0 PG    MCHC 29.4 (L) 30.0 - 36.5 g/dL    RDW 21.0 (H) 11.5 - 14.5 %    PLATELET 808 (L) 714 - 400 K/uL    MPV 10.6 8.9 - 12.9 FL    NEUTROPHILS 90 (H) 32 - 75 %    LYMPHOCYTES 4 (L) 12 - 49 %    MONOCYTES 5 5 - 13 %    EOSINOPHILS 1 0 - 7 %    BASOPHILS 0 0 - 1 %    IMMATURE GRANULOCYTES 0 0.0 - 0.5 %    ABS. NEUTROPHILS 6.4 1.8 - 8.0 K/UL    ABS. LYMPHOCYTES 0.3 (L) 0.8 - 3.5 K/UL    ABS. MONOCYTES 0.4 0.0 - 1.0 K/UL    ABS. EOSINOPHILS 0.1 0.0 - 0.4 K/UL    ABS. BASOPHILS 0.0 0.0 - 0.1 K/UL    ABS. IMM.  GRANS. 0.0 0.00 - 0.04 K/UL    DF AUTOMATED     RENAL FUNCTION PANEL    Collection Time: 03/28/21 10:50 AM   Result Value Ref Range    Sodium 157 (H) 136 - 145 mmol/L    Potassium 4.2 3.5 - 5.1 mmol/L    Chloride 125 (H) 97 - 108 mmol/L    CO2 23 21 - 32 mmol/L    Anion gap 9 5 - 15 mmol/L    Glucose 165 (H) 65 - 100 mg/dL     (H) 6 - 20 mg/dL    Creatinine 4.19 (H) 0.70 - 1.30 mg/dL    BUN/Creatinine ratio 30 (H) 12 - 20      GFR est AA 17 (L) >60 ml/min/1.73m2    GFR est non-AA 14 (L) >60 ml/min/1.73m2    Calcium 8.7 8.5 - 10.1 mg/dL    Phosphorus 4.6 2.6 - 4.7 mg/dL    Albumin 2.5 (L) 3.5 - 5.0 g/dL   PTT    Collection Time: 03/28/21 10:50 AM   Result Value Ref Range aPTT 94.6 (H) 23.0 - 35.7 sec    aPTT, therapeutic range   68 - 109 sec     [unfilled]      Review of Systems    Unable to obtain      Physical Exam:      Constitutional: Awake not oriented to time and space   HENT:   Head: Normocephalic and atraumatic. Eyes: Pupils are equal, round, and reactive to light. EOM are normal.   Cardiovascular: Normal rate, regular rhythm and normal heart sounds. Pulmonary/Chest: Breath sounds normal. No wheezes. No rales. Exhibits no tenderness. Abdominal: Soft. Bowel sounds are normal. There is no abdominal tenderness. There is no rebound and no guarding. Musculoskeletal: Normal range of motion. Neurological: Contracted leg  XR CHEST PORT   Final Result   NG tube position as above. Worsening lung aeration. CT ABD PELV WO CONT   Final Result   No acute abdominopelvic abnormality. Nonobstructing bilateral renal   calculi. Correlate for constipation given increased stool burden. End-stage   degenerative changes of the hips with postoperative change in the right   acetabulum. Pulmonary interstitial abnormality partially visualized in the acute   setting would suggest infection/inflammation or edema versus a combination of   these and in any case with early airspace component at the right lung base. CT HEAD WO CONT   Final Result   No acute intracranial abnormality. 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 Mild   generalized atrophy with nonspecific white matter abnormality that may indicate   chronic small vessel disease. 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 XR CHEST PORT   Final Result   Rotated exam. Diffuse interstitial abnormality and with question of   some superimposed nodular areas.  In the acute setting edema, infection or   combination of these is considered however, especially given the possible   nodular opacities, follow-up to radiographic resolution and/or further evaluation with chest CT if symptoms/findings do not resolve as expected with   treatment, or if indicated otherwise. Recent Results (from the past 24 hour(s))   PTT    Collection Time: 03/27/21  5:06 PM   Result Value Ref Range    aPTT 26.1 23.0 - 35.7 sec    aPTT, therapeutic range   68 - 109 sec   CBC WITH AUTOMATED DIFF    Collection Time: 03/27/21  5:06 PM   Result Value Ref Range    WBC 8.9 4.1 - 11.1 K/uL    RBC 4.17 4.10 - 5.70 M/uL    HGB 11.5 (L) 12.1 - 17.0 g/dL    HCT 38.4 36.6 - 50.3 %    MCV 92.1 80.0 - 99.0 FL    MCH 27.6 26.0 - 34.0 PG    MCHC 29.9 (L) 30.0 - 36.5 g/dL    RDW 20.7 (H) 11.5 - 14.5 %    PLATELET 035 602 - 576 K/uL    MPV 11.6 8.9 - 12.9 FL    NEUTROPHILS 95 (H) 32 - 75 %    LYMPHOCYTES 2 (L) 12 - 49 %    MONOCYTES 3 (L) 5 - 13 %    EOSINOPHILS 0 0 - 7 %    BASOPHILS 0 0 - 1 %    IMMATURE GRANULOCYTES 0 0.0 - 0.5 %    ABS. NEUTROPHILS 8.5 (H) 1.8 - 8.0 K/UL    ABS. LYMPHOCYTES 0.2 (L) 0.8 - 3.5 K/UL    ABS. MONOCYTES 0.3 0.0 - 1.0 K/UL    ABS. EOSINOPHILS 0.0 0.0 - 0.4 K/UL    ABS. BASOPHILS 0.0 0.0 - 0.1 K/UL    ABS. IMM. GRANS. 0.0 0.00 - 0.04 K/UL    DF AUTOMATED     PTT    Collection Time: 03/28/21  1:16 AM   Result Value Ref Range    aPTT 54.2 (H) 23.0 - 35.7 sec    aPTT, therapeutic range   68 - 726 sec   METABOLIC PANEL, COMPREHENSIVE    Collection Time: 03/28/21 10:50 AM   Result Value Ref Range    Sodium 155 (H) 136 - 145 mmol/L    Potassium 4.2 3.5 - 5.1 mmol/L    Chloride 124 (H) 97 - 108 mmol/L    CO2 22 21 - 32 mmol/L    Anion gap 9 5 - 15 mmol/L    Glucose 166 (H) 65 - 100 mg/dL     (H) 6 - 20 mg/dL    Creatinine 4.22 (H) 0.70 - 1.30 mg/dL    BUN/Creatinine ratio 30 (H) 12 - 20      GFR est AA 17 (L) >60 ml/min/1.73m2    GFR est non-AA 14 (L) >60 ml/min/1.73m2    Calcium 8.9 8.5 - 10.1 mg/dL    Bilirubin, total 0.6 0.2 - 1.0 mg/dL    AST (SGOT) 78 (H) 15 - 37 U/L    ALT (SGPT) 58 12 - 78 U/L    Alk.  phosphatase 105 45 - 117 U/L    Protein, total 6.5 6.4 - 8.2 g/dL    Albumin 2.4 (L) 3.5 - 5.0 g/dL    Globulin 4.1 (H) 2.0 - 4.0 g/dL    A-G Ratio 0.6 (L) 1.1 - 2.2     CBC WITH AUTOMATED DIFF    Collection Time: 03/28/21 10:50 AM   Result Value Ref Range    WBC 7.1 4.1 - 11.1 K/uL    RBC 3.81 (L) 4.10 - 5.70 M/uL    HGB 10.5 (L) 12.1 - 17.0 g/dL    HCT 35.7 (L) 36.6 - 50.3 %    MCV 93.7 80.0 - 99.0 FL    MCH 27.6 26.0 - 34.0 PG    MCHC 29.4 (L) 30.0 - 36.5 g/dL    RDW 21.0 (H) 11.5 - 14.5 %    PLATELET 501 (L) 853 - 400 K/uL    MPV 10.6 8.9 - 12.9 FL    NEUTROPHILS 90 (H) 32 - 75 %    LYMPHOCYTES 4 (L) 12 - 49 %    MONOCYTES 5 5 - 13 %    EOSINOPHILS 1 0 - 7 %    BASOPHILS 0 0 - 1 %    IMMATURE GRANULOCYTES 0 0.0 - 0.5 %    ABS. NEUTROPHILS 6.4 1.8 - 8.0 K/UL    ABS. LYMPHOCYTES 0.3 (L) 0.8 - 3.5 K/UL    ABS. MONOCYTES 0.4 0.0 - 1.0 K/UL    ABS. EOSINOPHILS 0.1 0.0 - 0.4 K/UL    ABS. BASOPHILS 0.0 0.0 - 0.1 K/UL    ABS. IMM.  GRANS. 0.0 0.00 - 0.04 K/UL    DF AUTOMATED     RENAL FUNCTION PANEL    Collection Time: 03/28/21 10:50 AM   Result Value Ref Range    Sodium 157 (H) 136 - 145 mmol/L    Potassium 4.2 3.5 - 5.1 mmol/L    Chloride 125 (H) 97 - 108 mmol/L    CO2 23 21 - 32 mmol/L    Anion gap 9 5 - 15 mmol/L    Glucose 165 (H) 65 - 100 mg/dL     (H) 6 - 20 mg/dL    Creatinine 4.19 (H) 0.70 - 1.30 mg/dL    BUN/Creatinine ratio 30 (H) 12 - 20      GFR est AA 17 (L) >60 ml/min/1.73m2    GFR est non-AA 14 (L) >60 ml/min/1.73m2    Calcium 8.7 8.5 - 10.1 mg/dL    Phosphorus 4.6 2.6 - 4.7 mg/dL    Albumin 2.5 (L) 3.5 - 5.0 g/dL   PTT    Collection Time: 03/28/21 10:50 AM   Result Value Ref Range    aPTT 94.6 (H) 23.0 - 35.7 sec    aPTT, therapeutic range   68 - 109 sec       Results     Procedure Component Value Units Date/Time    CULTURE, URINE [[id number]] Collected: 03/27/21 1700    Order Status: Completed Specimen: Urine Updated: 03/28/21 4754    COVID-19 RAPID TEST [[id number]] Collected: 03/27/21 0730    Order Status: Completed Specimen: Nasopharyngeal Updated: 03/27/21 0813     Specimen source Nasopharyngeal        COVID-19 rapid test Not Detected        Comment: Rapid [employer] ID Now   Rapid NAAT:  The specimen is NEGATIVE for SARS-CoV-2, the novel coronavirus associated with COVID-19. Negative results should be treated as presumptive and, if inconsistent with clinical signs and symptoms or necessary for patient management, should be tested with an alternative molecular assay. Negative results do not preclude SARS-CoV-2 infection and should not be used as the sole basis for patient management decisions. This test has been authorized by the FDA under   an Emergency Use Authorization (EUA) for use by authorized laboratories.  Fact sheet for Healthcare Providers: ConventionUpdate.co.nz Fact sheet for Patients: ConventionAccupassdate.co.nz   Methodology: Isothermal Nucleic Acid Amplification         CULTURE, BLOOD, PAIRED [[id number]] Collected: 03/26/21 2045    Order Status: Completed Specimen: Blood Updated: 03/28/21 0902     Special Requests: No Special Requests        Culture result: No growth after 23 hours              Labs:     Recent Labs     03/28/21  1050 03/27/21  1706   WBC 7.1 8.9   HGB 10.5* 11.5*   HCT 35.7* 38.4   * 173     Recent Labs     03/28/21  1050 03/27/21  1300 03/27/21  0616 03/26/21 2045   *  155* 155* 158* 154*   K 4.2  4.2 5.9* 5.8* 5.6*   *  124* 125* 122* 116*   CO2 23  22 17* 20* 18*   *  127* 179* 186* 207*   CREA 4.19*  4.22* 6.44* 6.79* 8.18*   *  166* 76 79 119*   CA 8.7  8.9 8.2* 8.1* 9.6   MG  --   --   --  5.6*   PHOS 4.6 7.8*  --   --      Recent Labs     03/28/21  1050 03/27/21  1300 03/26/21 2045   ALT 58  --  90*     --  131*   TBILI 0.6  --  0.6   TP 6.5  --  8.6*   ALB 2.5*  2.4* 2.3* 3.3*   GLOB 4.1*  --  5.3*     Recent Labs     03/28/21  1050 03/28/21  0116 03/27/21  1706   APTT 94.6* 54.2* 26.1      No results for input(s): FE, TIBC, PSAT, FERR in the last 72 hours. No results found for: FOL, RBCF   No results for input(s): PH, PCO2, PO2 in the last 72 hours.   Recent Labs     03/27/21  0616 03/26/21 2045   CPK  --  763*   TROIQ 0.33* 0.57*     No results found for: CHOL, CHOLX, CHLST, CHOLV, HDL, HDLP, LDL, LDLC, DLDLP, TGLX, TRIGL, TRIGP, CHHD, CHHDX  Lab Results   Component Value Date/Time    Glucose (POC) 84 03/27/2021 10:52 AM     Lab Results   Component Value Date/Time    Color Yellow 03/27/2021 01:55 AM    Appearance Turbid (A) 03/27/2021 01:55 AM    Specific gravity 1.019 03/27/2021 01:55 AM    pH (UA) 5.0 03/27/2021 01:55 AM    Protein 100 (A) 03/27/2021 01:55 AM    Glucose Negative 03/27/2021 01:55 AM    Ketone Negative 03/27/2021 01:55 AM    Bilirubin Negative 03/27/2021 01:55 AM    Urobilinogen 0.1 03/27/2021 01:55 AM    Nitrites Negative 03/27/2021 01:55 AM    Leukocyte Esterase Large (A) 03/27/2021 01:55 AM    Bacteria Negative 03/27/2021 01:55 AM    Bacteria Negative 03/27/2021 01:55 AM    WBC 20-50 03/27/2021 01:55 AM    WBC 20-50 03/27/2021 01:55 AM    RBC 20-50 03/27/2021 01:55 AM    RBC 20-50 03/27/2021 01:55 AM         Assessment:        A. fib with rapid ventricular rate  Acute kidney injury  Hyperkalemia  Hyponatremia  UTI  History of recent Covid  History of obstructive uropathy  Dementia  Functional quadriplegic  History of CVA  History of DVT  Hyperlipidemia  UTI      Plan:     Continue aspirin Lipitor and Rocephin  Start on amiodarone drip and heparin drip    Nephrology and cardiology consult    Repeat the lab            Current Facility-Administered Medications:     lactated Ringers infusion 1,000 mL, 1,000 mL, IntraVENous, CONTINUOUS, Sandrita Moon MD, Stopped at 03/27/21 0536    lactated Ringers infusion, 125 mL/hr, IntraVENous, CONTINUOUS, Carlene Moon MD, Last Rate: 125 mL/hr at 03/27/21 0346, 125 mL/hr at 03/27/21 0346    lactated Ringers infusion 1,000 mL, 1,000 mL, IntraVENous, CONTINUOUS, Talia Hartman MD, Stopped at 03/27/21 0535    acetaminophen (TYLENOL) tablet 650 mg, 650 mg, Oral, Q6H PRN **OR** acetaminophen (TYLENOL) suppository 650 mg, 650 mg, Rectal, Q6H PRN, Patrice Moon MD    polyethylene glycol (MIRALAX) packet 17 g, 17 g, Oral, DAILY PRN, Patrice Moon MD    ondansetron (ZOFRAN ODT) tablet 4 mg, 4 mg, Oral, Q8H PRN **OR** ondansetron (ZOFRAN) injection 4 mg, 4 mg, IntraVENous, Q6H PRN, Carlene Moon MD    heparin 25,000 units in D5W 250 ml infusion, 12-25 Units/kg/hr, IntraVENous, TITRATE, Kayla Amor MD, Last Rate: 10.2 mL/hr at 03/28/21 0358, 18 Units/kg/hr at 03/28/21 0358    amiodarone (CORDARONE) 375 mg in dextrose 5% 250 mL infusion, 0.5-1 mg/min, IntraVENous, TITRATE, Kayla Amor MD, Last Rate: 20 mL/hr at 03/28/21 0145, 0.5 mg/min at 03/28/21 0145    heparin (porcine) 1,000 unit/mL injection 3,400 Units, 60 Units/kg, IntraVENous, PRN **OR** heparin (porcine) 1,000 unit/mL injection 1,700 Units, 30 Units/kg, IntraVENous, PRN, Kayla Amor MD, 1,700 Units at 03/28/21 0406    dextrose 5 % - 0.2% NaCl infusion, 150 mL/hr, IntraVENous, CONTINUOUS, Brendan Coreas MD, Last Rate: 150 mL/hr at 03/28/21 1502, 150 mL/hr at 03/28/21 1502    cefTRIAXone (ROCEPHIN) 1 g in sterile water (preservative free) 10 mL IV syringe, 1 g, IntraVENous, Q24H, Brendan Coreas MD, 1 g at 03/28/21 1505    aspirin tablet 325 mg, 325 mg, Oral, DAILY, Carlene Moon MD, 325 mg at 03/28/21 1124    atorvastatin (LIPITOR) tablet 40 mg, 40 mg, Oral, QHS, Carlene Moon MD    nitroglycerin (NITROBID) 2 % ointment 1 Inch, 1 Inch, Topical, Q4H PRN, Carlene Moon MD    lactated Ringers infusion 1,000 mL, 1,000 mL, IntraVENous, CONTINUOUS, Carlene Moon MD, 1,000 mL at 03/26/21 1194

## 2021-03-28 NOTE — PROGRESS NOTES
Nephrology Consult    Patient: Seema Chao MRN: [id number]  SSN: [id number]    YOB: 1945  Age: 66 y.o. Sex: male      Subjective:   Pt is seen in the room. On IVF  Improving renal functions  UOP 2100 cc last 24 hrs    Past Medical History:   Diagnosis Date    Anemia     BPH (benign prostatic hyperplasia)     CKD (chronic kidney disease)     [facility]     Dementia [facility]     [facility]     GERD (gastroesophageal reflux disease)     Glaucoma     Hyperlipemia      No past surgical history on file. No family history on file.   Social History     Tobacco Use    Smoking status: Unknown If Ever Smoked   Substance Use Topics    Alcohol use: Not Currently      Current Facility-Administered Medications   Medication Dose Route Frequency Provider Last Rate Last Admin    lactated Ringers infusion 1,000 mL  1,000 mL IntraVENous CONTINUOUS Carlene Moon MD   Stopped at 03/27/21 0536    lactated Ringers infusion  125 mL/hr IntraVENous CONTINUOUS Carlene Moon  mL/hr at 03/27/21 0346 125 mL/hr at 03/27/21 0346    lactated Ringers infusion 1,000 mL  1,000 mL IntraVENous CONTINUOUS Alejandra Moon MD   Stopped at 03/27/21 0535    acetaminophen (TYLENOL) tablet 650 mg  650 mg Oral Q6H PRN Alejandra Moon MD        Or   Ethelealy Daunt acetaminophen (TYLENOL) suppository 650 mg  650 mg Rectal Q6H PRN Alejandra Moon MD        polyethylene glycol (MIRALAX) packet 17 g  17 g Oral DAILY PRN Alejandra Moon MD        ondansetron (ZOFRAN ODT) tablet 4 mg  4 mg Oral Q8H PRN Alejandra Moon MD        Or    ondansetron [facility]) injection 4 mg  4 mg IntraVENous Q6H PRN Carlene Moon MD        heparin 25,000 units in D5W 250 ml infusion  12-25 Units/kg/hr IntraVENous TITRATE Nohemy Jones MD 10.2 mL/hr at 03/28/21 0358 18 Units/kg/hr at 03/28/21 0358    amiodarone (CORDARONE) 375 mg in dextrose 5% 250 mL infusion  0.5-1 mg/min IntraVENous TITRATE Eli Pantoja MD 20 mL/hr at 03/28/21 0145 0.5 mg/min at 03/28/21 0145    heparin (porcine) 1,000 unit/mL injection 3,400 Units  60 Units/kg IntraVENous PRN Eli Pantoja MD        Or    heparin (porcine) 1,000 unit/mL injection 1,700 Units  30 Units/kg IntraVENous PRN Eli Pantoja MD   1,700 Units at 03/28/21 0406    dextrose 5 % - 0.2% NaCl infusion  150 mL/hr IntraVENous CONTINUOUS Alvena Seip,  mL/hr at 03/28/21 0151 150 mL/hr at 03/28/21 0151    cefTRIAXone (ROCEPHIN) 1 g in sterile water (preservative free) 10 mL IV syringe  1 g IntraVENous Q24H Alvena Seip, MD   1 g at 03/27/21 1444    aspirin tablet 325 mg  325 mg Oral DAILY Carlene Moon MD   325 mg at 03/28/21 1124    atorvastatin (LIPITOR) tablet 40 mg  40 mg Oral QHS Carlene Moon MD        nitroglycerin (NITROBID) 2 % ointment 1 Inch  1 Inch Topical Q4H PRN Hemal Moon MD        lactated Ringers infusion 1,000 mL  1,000 mL IntraVENous CONTINUOUS Hemal Moon MD   1,000 mL at 03/26/21 2317        Allergies   Allergen Reactions    Flonase [Fluticasone] Unknown (comments)       Review of Systems:  Unable to obtain    Objective:     Vitals:    03/28/21 0000 03/28/21 0418 03/28/21 0724 03/28/21 1202   BP:  (!) 111/55 115/62 102/60   Pulse: 90 64 82 64   Resp:  20     Temp:  97.7 °F (36.5 °C) 97.9 °F (36.6 °C) 97.9 °F (36.6 °C)   SpO2:  95% 93% 95%   Weight:       Height:            Physical Exam:  General: AMS  Eyes: sclera anicteric  Oral Cavity: No thrush or ulcers  Neck: no JVD  Chest: Fair bilateral air entry  Heart: normal sounds  Abdomen: soft and non tender   :  Vazquez+  Lower Extremities: no edema  Skin: no rash  Neuro: ams  Psychiatric: non-depressed            Assessment:     Hospital Problems  Never Reviewed          Codes Class Noted POA    Hypovolemia ICD-10-CM: E86.1  ICD-9-CM: 276.52  3/27/2021 Unknown        Acute renal failure (ARF) ([facility] ICD-10-CM: N17.9  ICD-9-CM: 584.9  3/27/2021 Unknown        Atrial fibrillation, rapid [facility]) ICD-10-CM: I48.91  ICD-9-CM: 427.31  3/27/2021 Unknown        JAE (acute kidney injury) [facility]) ICD-10-CM: N17.9  ICD-9-CM: 584.9  3/27/2021 Unknown              Plan:     1 acute kidney injury:   -Etiology is likely prerenal azotemia from volume depletion.    -On admission BUN/creatinine 207/8.1, has improved down to 127/4.22.  -Received 3 L fluid boluses in the ER.    -CT abdomen no evidence of hydronephrosis.    -Has Vazquez catheter in place and 2100 cc outpt last 24 hrs.    -No  indication for dialysis. -I will keep on maintenance IV fluids. 2.  Hypernatremia.    -From free water deficit.    -Sodium 158-->155.    -I will keep  on hypotonic IV fluids. 3.  Hyperkalemia. -Due to #1.    -Potassium 5.8.    -I will give Kayexalate 30 g x 1 dose. 4.  Hypermagnesemia.    -Due to #1.    -Magnesium 5.6.  3/26  -received IV calcium gluconate. -IV fluids. 5.  Atrial fibrillation.    -Cardiology on board. -Getting started on amiodarone drip.    -On IV heparin drip. 6.  Altered mental status.    -Could be metabolic encephalopathy/UTI. -CT head no acute process. -Urinalysis consistent with UTI. - I will put on IV ceftriaxone.    -Could be uremia also.           Signed By: Jake Adan MD     March 28, 2021

## 2021-03-28 NOTE — PROGRESS NOTES
PROGRESS NOTE - CARDIOLOGY    CHIEF COMPLAINT:  F/u atrial fibrillation with RVR    HISTORY OF PRESENT ILLNESS / OVERNIGHT EVENTS  Lying in bed non-conversant (baseline). NG tube in place. Atrial fibrillation better controlled in 80's on my review. Increases to 110's with movement but better controlled. Continues on amiodarone gtt with plans for oral today. Care discussed with Sonido Rodrigeztiffanykathie.     MEDICATIONS/PMHx    Current Facility-Administered Medications:     amiodarone (CORDARONE) tablet 400 mg, 400 mg, Oral, BID, Matilde Wade MD    lactated Ringers infusion 1,000 mL, 1,000 mL, IntraVENous, CONTINUOUS, Josue Moon MD, Stopped at 03/27/21 0536    lactated Ringers infusion, 125 mL/hr, IntraVENous, CONTINUOUS, Carlene Moon MD, Last Rate: 125 mL/hr at 03/27/21 0346, 125 mL/hr at 03/27/21 0346    lactated Ringers infusion 1,000 mL, 1,000 mL, IntraVENous, CONTINUOUS, Josue Moon MD, Stopped at 03/27/21 0535    acetaminophen (TYLENOL) tablet 650 mg, 650 mg, Oral, Q6H PRN **OR** acetaminophen (TYLENOL) suppository 650 mg, 650 mg, Rectal, Q6H PRN, Josue Moon MD    polyethylene glycol (MIRALAX) packet 17 g, 17 g, Oral, DAILY PRN, Josue Moon MD    ondansetron (ZOFRAN ODT) tablet 4 mg, 4 mg, Oral, Q8H PRN **OR** ondansetron (ZOFRAN) injection 4 mg, 4 mg, IntraVENous, Q6H PRN, Carlene Moon MD    heparin 25,000 units in D5W 250 ml infusion, 12-25 Units/kg/hr, IntraVENous, TITRATE, Matilde Wade MD, Last Rate: 10.2 mL/hr at 03/28/21 0358, 18 Units/kg/hr at 03/28/21 0358    heparin (porcine) 1,000 unit/mL injection 3,400 Units, 60 Units/kg, IntraVENous, PRN **OR** heparin (porcine) 1,000 unit/mL injection 1,700 Units, 30 Units/kg, IntraVENous, PRN, Matilde Wade MD, 1,700 Units at 03/28/21 0406    dextrose 5 % - 0.2% NaCl infusion, 150 mL/hr, IntraVENous, CONTINUOUS, Brendan Coreas MD, Last Rate: 150 mL/hr at 03/28/21 1502, 150 mL/hr at 03/28/21 1502    cefTRIAXone (ROCEPHIN) 1 g in sterile water (preservative free) 10 mL IV syringe, 1 g, IntraVENous, Q24H, Brendan Coreas MD, 1 g at 03/28/21 1505    aspirin tablet 325 mg, 325 mg, Oral, DAILY, Carlene Moon MD, 325 mg at 03/28/21 1124    atorvastatin (LIPITOR) tablet 40 mg, 40 mg, Oral, QHS, Carlene Moon MD    nitroglycerin (NITROBID) 2 % ointment 1 Inch, 1 Inch, Topical, Q4H PRN, Carlene Moon MD    lactated Ringers infusion 1,000 mL, 1,000 mL, IntraVENous, CONTINUOUS, Carlene Moon MD, 1,000 mL at 03/26/21 2317    PHYSICAL EXAMINATION  Visit Vitals  BP (!) 113/59 (BP 1 Location: Right upper arm, BP Patient Position: Lying left side)   Pulse 92   Temp 97.6 °F (36.4 °C)   Resp 20   Ht 6' 2.02\" (1.88 m)   Wt 69.1 kg (152 lb 5.4 oz)   SpO2 95%   BMI 19.55 kg/m²     General: Not conversant. Minimally interactive. At baseline. HEENT/neck: no JVD, no masses, trachea midline. Pulmonary: Decreased breath sounds than expected I cannot appreciate crackles. Cardiovascular: Irregular rate irregular rhythm; no murmurs, rubs or gallops. Normal point of maximal impulse. No peripheral edema   GI: soft, nontender, no hepatosplenomegaly  Hematology/Oncology: no lymphadenopathy; no bruising  Skin: warm and dry, no rashes, lesions, or ulcer  Musculoskeletal: Moving all four extremities.   Gait and station not assessed    MEDICAL DECISION MAKING  Recent Results (from the past 24 hour(s))   PTT    Collection Time: 03/27/21  5:06 PM   Result Value Ref Range    aPTT 26.1 23.0 - 35.7 sec    aPTT, therapeutic range   68 - 109 sec   CBC WITH AUTOMATED DIFF    Collection Time: 03/27/21  5:06 PM   Result Value Ref Range    WBC 8.9 4.1 - 11.1 K/uL    RBC 4.17 4.10 - 5.70 M/uL    HGB 11.5 (L) 12.1 - 17.0 g/dL    HCT 38.4 36.6 - 50.3 %    MCV 92.1 80.0 - 99.0 FL    MCH 27.6 26.0 - 34.0 PG    MCHC 29.9 (L) 30.0 - 36.5 g/dL    RDW 20.7 (H) 11.5 - 14.5 %    PLATELET 378 358 - 260 K/uL    MPV 11.6 8.9 - 12.9 FL    NEUTROPHILS 95 (H) 32 - 75 %    LYMPHOCYTES 2 (L) 12 - 49 %    MONOCYTES 3 (L) 5 - 13 %    EOSINOPHILS 0 0 - 7 %    BASOPHILS 0 0 - 1 %    IMMATURE GRANULOCYTES 0 0.0 - 0.5 %    ABS. NEUTROPHILS 8.5 (H) 1.8 - 8.0 K/UL    ABS. LYMPHOCYTES 0.2 (L) 0.8 - 3.5 K/UL    ABS. MONOCYTES 0.3 0.0 - 1.0 K/UL    ABS. EOSINOPHILS 0.0 0.0 - 0.4 K/UL    ABS. BASOPHILS 0.0 0.0 - 0.1 K/UL    ABS. IMM. GRANS. 0.0 0.00 - 0.04 K/UL    DF AUTOMATED     PTT    Collection Time: 03/28/21  1:16 AM   Result Value Ref Range    aPTT 54.2 (H) 23.0 - 35.7 sec    aPTT, therapeutic range   68 - 487 sec   METABOLIC PANEL, COMPREHENSIVE    Collection Time: 03/28/21 10:50 AM   Result Value Ref Range    Sodium 155 (H) 136 - 145 mmol/L    Potassium 4.2 3.5 - 5.1 mmol/L    Chloride 124 (H) 97 - 108 mmol/L    CO2 22 21 - 32 mmol/L    Anion gap 9 5 - 15 mmol/L    Glucose 166 (H) 65 - 100 mg/dL     (H) 6 - 20 mg/dL    Creatinine 4.22 (H) 0.70 - 1.30 mg/dL    BUN/Creatinine ratio 30 (H) 12 - 20      GFR est AA 17 (L) >60 ml/min/1.73m2    GFR est non-AA 14 (L) >60 ml/min/1.73m2    Calcium 8.9 8.5 - 10.1 mg/dL    Bilirubin, total 0.6 0.2 - 1.0 mg/dL    AST (SGOT) 78 (H) 15 - 37 U/L    ALT (SGPT) 58 12 - 78 U/L    Alk. phosphatase 105 45 - 117 U/L    Protein, total 6.5 6.4 - 8.2 g/dL    Albumin 2.4 (L) 3.5 - 5.0 g/dL    Globulin 4.1 (H) 2.0 - 4.0 g/dL    A-G Ratio 0.6 (L) 1.1 - 2.2     CBC WITH AUTOMATED DIFF    Collection Time: 03/28/21 10:50 AM   Result Value Ref Range    WBC 7.1 4.1 - 11.1 K/uL    RBC 3.81 (L) 4.10 - 5.70 M/uL    HGB 10.5 (L) 12.1 - 17.0 g/dL    HCT 35.7 (L) 36.6 - 50.3 %    MCV 93.7 80.0 - 99.0 FL    MCH 27.6 26.0 - 34.0 PG    MCHC 29.4 (L) 30.0 - 36.5 g/dL    RDW 21.0 (H) 11.5 - 14.5 %    PLATELET 169 (L) 343 - 400 K/uL    MPV 10.6 8.9 - 12.9 FL    NEUTROPHILS 90 (H) 32 - 75 %    LYMPHOCYTES 4 (L) 12 - 49 %    MONOCYTES 5 5 - 13 %    EOSINOPHILS 1 0 - 7 %    BASOPHILS 0 0 - 1 %    IMMATURE GRANULOCYTES 0 0.0 - 0.5 %    ABS.  NEUTROPHILS 6.4 1.8 - 8.0 K/UL ABS. LYMPHOCYTES 0.3 (L) 0.8 - 3.5 K/UL    ABS. MONOCYTES 0.4 0.0 - 1.0 K/UL    ABS. EOSINOPHILS 0.1 0.0 - 0.4 K/UL    ABS. BASOPHILS 0.0 0.0 - 0.1 K/UL    ABS. IMM. GRANS. 0.0 0.00 - 0.04 K/UL    DF AUTOMATED     RENAL FUNCTION PANEL    Collection Time: 03/28/21 10:50 AM   Result Value Ref Range    Sodium 157 (H) 136 - 145 mmol/L    Potassium 4.2 3.5 - 5.1 mmol/L    Chloride 125 (H) 97 - 108 mmol/L    CO2 23 21 - 32 mmol/L    Anion gap 9 5 - 15 mmol/L    Glucose 165 (H) 65 - 100 mg/dL     (H) 6 - 20 mg/dL    Creatinine 4.19 (H) 0.70 - 1.30 mg/dL    BUN/Creatinine ratio 30 (H) 12 - 20      GFR est AA 17 (L) >60 ml/min/1.73m2    GFR est non-AA 14 (L) >60 ml/min/1.73m2    Calcium 8.7 8.5 - 10.1 mg/dL    Phosphorus 4.6 2.6 - 4.7 mg/dL    Albumin 2.5 (L) 3.5 - 5.0 g/dL   PTT    Collection Time: 03/28/21 10:50 AM   Result Value Ref Range    aPTT 94.6 (H) 23.0 - 35.7 sec    aPTT, therapeutic range   68 - 109 sec       IMPRESSION/PLAN  Paroxysmal atrial fibrillation with rapid ventricular response  Acute kidney injury stage III secondary to prerenal azotemia  Severe hypernatremia  NSTEMI type II secondary to supply-demand, not primary event  Urinary tract infection - cont abx  Hyperkalemia secondary to renal failure (issue #2) -- improved today.      Stop amiodarone gtt. Begin oral amiodarone. Plan amiodarone 400mg po BID for 1 week, then amiodarone 200mg once daily indefinitely. For JAE stage 3 and hypernatremia, consider fluid hydration. Defer to nephrology.

## 2021-03-29 LAB
ALBUMIN SERPL-MCNC: 2.4 G/DL (ref 3.5–5)
ALBUMIN/GLOB SERPL: 0.6 {RATIO} (ref 1.1–2.2)
ALP SERPL-CCNC: 119 U/L (ref 45–117)
ALT SERPL-CCNC: 57 U/L (ref 12–78)
ANION GAP SERPL CALC-SCNC: 6 MMOL/L (ref 5–15)
APTT PPP: 142.1 SEC (ref 23–35.7)
APTT PPP: 78.8 SEC (ref 23–35.7)
AST SERPL W P-5'-P-CCNC: 81 U/L (ref 15–37)
BACTERIA SPEC CULT: NORMAL
BILIRUB SERPL-MCNC: 0.4 MG/DL (ref 0.2–1)
BUN SERPL-MCNC: 100 MG/DL (ref 6–20)
BUN/CREAT SERPL: 31 (ref 12–20)
CA-I BLD-MCNC: 9 MG/DL (ref 8.5–10.1)
CHLORIDE SERPL-SCNC: 122 MMOL/L (ref 97–108)
CO2 SERPL-SCNC: 26 MMOL/L (ref 21–32)
CREAT SERPL-MCNC: 3.19 MG/DL (ref 0.7–1.3)
GLOBULIN SER CALC-MCNC: 3.9 G/DL (ref 2–4)
GLUCOSE BLD STRIP.AUTO-MCNC: 123 MG/DL (ref 65–100)
GLUCOSE SERPL-MCNC: 112 MG/DL (ref 65–100)
PERFORMED BY, TECHID: ABNORMAL
POTASSIUM SERPL-SCNC: 4.1 MMOL/L (ref 3.5–5.1)
PROT SERPL-MCNC: 6.3 G/DL (ref 6.4–8.2)
SODIUM SERPL-SCNC: 154 MMOL/L (ref 136–145)
SPECIAL REQUESTS,SREQ: NORMAL
THERAPEUTIC RANGE,PTTT: ABNORMAL SEC (ref 68–109)
THERAPEUTIC RANGE,PTTT: ABNORMAL SEC (ref 68–109)

## 2021-03-29 PROCEDURE — 74011250636 HC RX REV CODE- 250/636: Performed by: INTERNAL MEDICINE

## 2021-03-29 PROCEDURE — 80053 COMPREHEN METABOLIC PANEL: CPT

## 2021-03-29 PROCEDURE — 97530 THERAPEUTIC ACTIVITIES: CPT

## 2021-03-29 PROCEDURE — 85730 THROMBOPLASTIN TIME PARTIAL: CPT

## 2021-03-29 PROCEDURE — 74011250637 HC RX REV CODE- 250/637: Performed by: FAMILY MEDICINE

## 2021-03-29 PROCEDURE — 74011000250 HC RX REV CODE- 250: Performed by: INTERNAL MEDICINE

## 2021-03-29 PROCEDURE — 82962 GLUCOSE BLOOD TEST: CPT

## 2021-03-29 PROCEDURE — 97161 PT EVAL LOW COMPLEX 20 MIN: CPT

## 2021-03-29 PROCEDURE — 36415 COLL VENOUS BLD VENIPUNCTURE: CPT

## 2021-03-29 PROCEDURE — 65270000029 HC RM PRIVATE

## 2021-03-29 PROCEDURE — 97165 OT EVAL LOW COMPLEX 30 MIN: CPT

## 2021-03-29 PROCEDURE — 74011250637 HC RX REV CODE- 250/637: Performed by: INTERNAL MEDICINE

## 2021-03-29 RX ADMIN — DEXTROSE AND SODIUM CHLORIDE 150 ML/HR: 5; 200 INJECTION, SOLUTION INTRAVENOUS at 23:27

## 2021-03-29 RX ADMIN — ACETAMINOPHEN 650 MG: 325 TABLET, FILM COATED ORAL at 14:31

## 2021-03-29 RX ADMIN — DEXTROSE AND SODIUM CHLORIDE 150 ML/HR: 5; 200 INJECTION, SOLUTION INTRAVENOUS at 16:33

## 2021-03-29 RX ADMIN — AMIODARONE HYDROCHLORIDE 400 MG: 200 TABLET ORAL at 08:15

## 2021-03-29 RX ADMIN — ATORVASTATIN CALCIUM 40 MG: 40 TABLET, FILM COATED ORAL at 21:48

## 2021-03-29 RX ADMIN — ACETAMINOPHEN 650 MG: 325 TABLET, FILM COATED ORAL at 21:48

## 2021-03-29 RX ADMIN — CEFTRIAXONE SODIUM 1 G: 1 INJECTION, POWDER, FOR SOLUTION INTRAMUSCULAR; INTRAVENOUS at 14:31

## 2021-03-29 RX ADMIN — ASPIRIN 325 MG ORAL TABLET 325 MG: 325 PILL ORAL at 10:19

## 2021-03-29 RX ADMIN — HEPARIN SODIUM 30 UNITS: 1000 INJECTION, SOLUTION INTRAVENOUS; SUBCUTANEOUS at 18:14

## 2021-03-29 RX ADMIN — AMIODARONE HYDROCHLORIDE 400 MG: 200 TABLET ORAL at 17:40

## 2021-03-29 RX ADMIN — DEXTROSE AND SODIUM CHLORIDE 150 ML/HR: 5; 200 INJECTION, SOLUTION INTRAVENOUS at 08:45

## 2021-03-29 NOTE — PROGRESS NOTES
CM attempted to contact pt's niece John Gibson [phone] to complete a discharge assessment. Cm left a voice message. [phone]    Cm received a phone call from pt's niece - Brooke Gauthier. Discharge assessment obtained. Pt does not have a medical POA or advanced directive. Mrs. Perdomo indicated she is the main person that takes care of things for the patient. Prior to hospitalization, pt was a [facility] for short term. Nimarjorie is unsure if pt will become a long term care resident at [facility], however at this time, the niece would like pt to return to [facility] at discharge. Niece had a concern and would like to speak with Dr. Dmitriy Lopez. Cm informed niece Cm will get in touch with Dr. Dmitriy Lopez and have him call her when he gets a chance. Referral made via [facility] to [facility].

## 2021-03-29 NOTE — PROGRESS NOTES
General Daily Progress Note          Patient Name:   Conor Maki       YOB: 1945       Age:  66 y.o. Admit Date: 3/26/2021      Subjective:     75yom with a history of dementia, stroke, COVID, DVT, CKD presented with lethargy and weakness and was diagnosed with JAE and hyperkalemia. Today, patient seen resting in bed and nonconversant (baseline) with NG tube in place with heparin drip. Per cardiology, A fib more controlled today with HR in the 80s on amiodarone. Patient will be switched to oral today. Of note, patient producing dark yellow urine. Objective:     Visit Vitals  /66 (BP 1 Location: Right upper arm, BP Patient Position: Lying left side)   Pulse 84   Temp 97.8 °F (36.6 °C)   Resp 20   Ht 6' 2.02\" (1.88 m)   Wt 152 lb 5.4 oz (69.1 kg)   SpO2 95%   BMI 19.55 kg/m²        Recent Results (from the past 24 hour(s))   METABOLIC PANEL, COMPREHENSIVE    Collection Time: 03/28/21 10:50 AM   Result Value Ref Range    Sodium 155 (H) 136 - 145 mmol/L    Potassium 4.2 3.5 - 5.1 mmol/L    Chloride 124 (H) 97 - 108 mmol/L    CO2 22 21 - 32 mmol/L    Anion gap 9 5 - 15 mmol/L    Glucose 166 (H) 65 - 100 mg/dL     (H) 6 - 20 mg/dL    Creatinine 4.22 (H) 0.70 - 1.30 mg/dL    BUN/Creatinine ratio 30 (H) 12 - 20      GFR est AA 17 (L) >60 ml/min/1.73m2    GFR est non-AA 14 (L) >60 ml/min/1.73m2    Calcium 8.9 8.5 - 10.1 mg/dL    Bilirubin, total 0.6 0.2 - 1.0 mg/dL    AST (SGOT) 78 (H) 15 - 37 U/L    ALT (SGPT) 58 12 - 78 U/L    Alk.  phosphatase 105 45 - 117 U/L    Protein, total 6.5 6.4 - 8.2 g/dL    Albumin 2.4 (L) 3.5 - 5.0 g/dL    Globulin 4.1 (H) 2.0 - 4.0 g/dL    A-G Ratio 0.6 (L) 1.1 - 2.2     CBC WITH AUTOMATED DIFF    Collection Time: 03/28/21 10:50 AM   Result Value Ref Range    WBC 7.1 4.1 - 11.1 K/uL    RBC 3.81 (L) 4.10 - 5.70 M/uL    HGB 10.5 (L) 12.1 - 17.0 g/dL    HCT 35.7 (L) 36.6 - 50.3 %    MCV 93.7 80.0 - 99.0 FL    MCH 27.6 26.0 - 34.0 PG    MCHC 29.4 (L) 30.0 - 36.5 g/dL    RDW 21.0 (H) 11.5 - 14.5 %    PLATELET 923 (L) 836 - 400 K/uL    MPV 10.6 8.9 - 12.9 FL    NEUTROPHILS 90 (H) 32 - 75 %    LYMPHOCYTES 4 (L) 12 - 49 %    MONOCYTES 5 5 - 13 %    EOSINOPHILS 1 0 - 7 %    BASOPHILS 0 0 - 1 %    IMMATURE GRANULOCYTES 0 0.0 - 0.5 %    ABS. NEUTROPHILS 6.4 1.8 - 8.0 K/UL    ABS. LYMPHOCYTES 0.3 (L) 0.8 - 3.5 K/UL    ABS. MONOCYTES 0.4 0.0 - 1.0 K/UL    ABS. EOSINOPHILS 0.1 0.0 - 0.4 K/UL    ABS. BASOPHILS 0.0 0.0 - 0.1 K/UL    ABS. IMM.  GRANS. 0.0 0.00 - 0.04 K/UL    DF AUTOMATED     RENAL FUNCTION PANEL    Collection Time: 03/28/21 10:50 AM   Result Value Ref Range    Sodium 157 (H) 136 - 145 mmol/L    Potassium 4.2 3.5 - 5.1 mmol/L    Chloride 125 (H) 97 - 108 mmol/L    CO2 23 21 - 32 mmol/L    Anion gap 9 5 - 15 mmol/L    Glucose 165 (H) 65 - 100 mg/dL     (H) 6 - 20 mg/dL    Creatinine 4.19 (H) 0.70 - 1.30 mg/dL    BUN/Creatinine ratio 30 (H) 12 - 20      GFR est AA 17 (L) >60 ml/min/1.73m2    GFR est non-AA 14 (L) >60 ml/min/1.73m2    Calcium 8.7 8.5 - 10.1 mg/dL    Phosphorus 4.6 2.6 - 4.7 mg/dL    Albumin 2.5 (L) 3.5 - 5.0 g/dL   PTT    Collection Time: 03/28/21 10:50 AM   Result Value Ref Range    aPTT 94.6 (H) 23.0 - 35.7 sec    aPTT, therapeutic range   68 - 109 sec   PTT    Collection Time: 03/28/21  7:30 PM   Result Value Ref Range    aPTT 100.3 (H) 23.0 - 35.7 sec    aPTT, therapeutic range   68 - 109 sec   CBC WITH AUTOMATED DIFF    Collection Time: 03/28/21  7:30 PM   Result Value Ref Range    WBC 6.3 4.1 - 11.1 K/uL    RBC 3.81 (L) 4.10 - 5.70 M/uL    HGB 10.5 (L) 12.1 - 17.0 g/dL    HCT 35.7 (L) 36.6 - 50.3 %    MCV 93.7 80.0 - 99.0 FL    MCH 27.6 26.0 - 34.0 PG    MCHC 29.4 (L) 30.0 - 36.5 g/dL    RDW 21.1 (H) 11.5 - 14.5 %    PLATELET 546 (L) 368 - 400 K/uL    MPV 11.6 8.9 - 12.9 FL    NRBC 0.0 0  WBC    ABSOLUTE NRBC 0.00 0.00 - 0.01 K/uL    NEUTROPHILS 86 (H) 32 - 75 %    LYMPHOCYTES 8 (L) 12 - 49 %    MONOCYTES 4 (L) 5 - 13 %    EOSINOPHILS 2 0 - 7 %    BASOPHILS 0 0 - 1 %    IMMATURE GRANULOCYTES 0 0.0 - 0.5 %    ABS. NEUTROPHILS 5.5 1.8 - 8.0 K/UL    ABS. LYMPHOCYTES 0.5 (L) 0.8 - 3.5 K/UL    ABS. MONOCYTES 0.2 0.0 - 1.0 K/UL    ABS. EOSINOPHILS 0.1 0.0 - 0.4 K/UL    ABS. BASOPHILS 0.0 0.0 - 0.1 K/UL    ABS. IMM. GRANS. 0.0 0.00 - 0.04 K/UL    DF AUTOMATED     METABOLIC PANEL, COMPREHENSIVE    Collection Time: 03/29/21  6:30 AM   Result Value Ref Range    Sodium 154 (H) 136 - 145 mmol/L    Potassium 4.1 3.5 - 5.1 mmol/L    Chloride 122 (H) 97 - 108 mmol/L    CO2 26 21 - 32 mmol/L    Anion gap 6 5 - 15 mmol/L    Glucose 112 (H) 65 - 100 mg/dL     (H) 6 - 20 mg/dL    Creatinine 3.19 (H) 0.70 - 1.30 mg/dL    BUN/Creatinine ratio 31 (H) 12 - 20      GFR est AA 23 (L) >60 ml/min/1.73m2    GFR est non-AA 19 (L) >60 ml/min/1.73m2    Calcium 9.0 8.5 - 10.1 mg/dL    Bilirubin, total 0.4 0.2 - 1.0 mg/dL    AST (SGOT) 81 (H) 15 - 37 U/L    ALT (SGPT) 57 12 - 78 U/L    Alk. phosphatase 119 (H) 45 - 117 U/L    Protein, total 6.3 (L) 6.4 - 8.2 g/dL    Albumin 2.4 (L) 3.5 - 5.0 g/dL    Globulin 3.9 2.0 - 4.0 g/dL    A-G Ratio 0.6 (L) 1.1 - 2.2     PTT    Collection Time: 03/29/21  6:30 AM   Result Value Ref Range    aPTT 142.1 (HH) 23.0 - 35.7 sec    aPTT, therapeutic range   68 - 109 sec   GLUCOSE, POC    Collection Time: 03/29/21  8:14 AM   Result Value Ref Range    Glucose (POC) 123 (H) 65 - 100 mg/dL    Performed by George Gould      [unfilled]      Review of Systems    Unable to obtain      Physical Exam:      Constitutional: Asleep   Head: Normocephalic and atraumatic. Eyes: Pupils are equal, round, and reactive to light. EOM are normal.   Cardiovascular: Normal rate, regular rhythm and normal heart sounds. Pulmonary/Chest: Breath sounds normal. No wheezes. No rales. Exhibits no tenderness. Abdominal: Soft. Bowel sounds are normal. There is no abdominal tenderness. There is no rebound and no guarding.    Musculoskeletal: Normal range of motion. Neurological: Contracted leg, unintelligible speech, functional quadriplegic  Skin: wounds on right hip, left medial knee, gluteal cleft     XR CHEST PORT   Final Result   NG tube position as above. Worsening lung aeration. CT ABD PELV WO CONT   Final Result   No acute abdominopelvic abnormality. Nonobstructing bilateral renal   calculi. Correlate for constipation given increased stool burden. End-stage   degenerative changes of the hips with postoperative change in the right   acetabulum. Pulmonary interstitial abnormality partially visualized in the acute   setting would suggest infection/inflammation or edema versus a combination of   these and in any case with early airspace component at the right lung base. CT HEAD WO CONT   Final Result   No acute intracranial abnormality. 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 Mild   generalized atrophy with nonspecific white matter abnormality that may indicate   chronic small vessel disease. 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 XR CHEST PORT   Final Result   Rotated exam. Diffuse interstitial abnormality and with question of   some superimposed nodular areas. In the acute setting edema, infection or   combination of these is considered however, especially given the possible   nodular opacities, follow-up to radiographic resolution and/or further   evaluation with chest CT if symptoms/findings do not resolve as expected with   treatment, or if indicated otherwise.                 Recent Results (from the past 24 hour(s))   METABOLIC PANEL, COMPREHENSIVE    Collection Time: 03/28/21 10:50 AM   Result Value Ref Range    Sodium 155 (H) 136 - 145 mmol/L    Potassium 4.2 3.5 - 5.1 mmol/L    Chloride 124 (H) 97 - 108 mmol/L    CO2 22 21 - 32 mmol/L    Anion gap 9 5 - 15 mmol/L    Glucose 166 (H) 65 - 100 mg/dL     (H) 6 - 20 mg/dL    Creatinine 4.22 (H) 0.70 - 1.30 mg/dL    BUN/Creatinine ratio 30 (H) 12 - 20      GFR est AA 17 (L) >60 ml/min/1.73m2    GFR est non-AA 14 (L) >60 ml/min/1.73m2    Calcium 8.9 8.5 - 10.1 mg/dL    Bilirubin, total 0.6 0.2 - 1.0 mg/dL    AST (SGOT) 78 (H) 15 - 37 U/L    ALT (SGPT) 58 12 - 78 U/L    Alk. phosphatase 105 45 - 117 U/L    Protein, total 6.5 6.4 - 8.2 g/dL    Albumin 2.4 (L) 3.5 - 5.0 g/dL    Globulin 4.1 (H) 2.0 - 4.0 g/dL    A-G Ratio 0.6 (L) 1.1 - 2.2     CBC WITH AUTOMATED DIFF    Collection Time: 03/28/21 10:50 AM   Result Value Ref Range    WBC 7.1 4.1 - 11.1 K/uL    RBC 3.81 (L) 4.10 - 5.70 M/uL    HGB 10.5 (L) 12.1 - 17.0 g/dL    HCT 35.7 (L) 36.6 - 50.3 %    MCV 93.7 80.0 - 99.0 FL    MCH 27.6 26.0 - 34.0 PG    MCHC 29.4 (L) 30.0 - 36.5 g/dL    RDW 21.0 (H) 11.5 - 14.5 %    PLATELET 559 (L) 280 - 400 K/uL    MPV 10.6 8.9 - 12.9 FL    NEUTROPHILS 90 (H) 32 - 75 %    LYMPHOCYTES 4 (L) 12 - 49 %    MONOCYTES 5 5 - 13 %    EOSINOPHILS 1 0 - 7 %    BASOPHILS 0 0 - 1 %    IMMATURE GRANULOCYTES 0 0.0 - 0.5 %    ABS. NEUTROPHILS 6.4 1.8 - 8.0 K/UL    ABS. LYMPHOCYTES 0.3 (L) 0.8 - 3.5 K/UL    ABS. MONOCYTES 0.4 0.0 - 1.0 K/UL    ABS. EOSINOPHILS 0.1 0.0 - 0.4 K/UL    ABS. BASOPHILS 0.0 0.0 - 0.1 K/UL    ABS. IMM.  GRANS. 0.0 0.00 - 0.04 K/UL    DF AUTOMATED     RENAL FUNCTION PANEL    Collection Time: 03/28/21 10:50 AM   Result Value Ref Range    Sodium 157 (H) 136 - 145 mmol/L    Potassium 4.2 3.5 - 5.1 mmol/L    Chloride 125 (H) 97 - 108 mmol/L    CO2 23 21 - 32 mmol/L    Anion gap 9 5 - 15 mmol/L    Glucose 165 (H) 65 - 100 mg/dL     (H) 6 - 20 mg/dL    Creatinine 4.19 (H) 0.70 - 1.30 mg/dL    BUN/Creatinine ratio 30 (H) 12 - 20      GFR est AA 17 (L) >60 ml/min/1.73m2    GFR est non-AA 14 (L) >60 ml/min/1.73m2    Calcium 8.7 8.5 - 10.1 mg/dL    Phosphorus 4.6 2.6 - 4.7 mg/dL    Albumin 2.5 (L) 3.5 - 5.0 g/dL   PTT    Collection Time: 03/28/21 10:50 AM   Result Value Ref Range    aPTT 94.6 (H) 23.0 - 35.7 sec aPTT, therapeutic range   68 - 109 sec   PTT    Collection Time: 03/28/21  7:30 PM   Result Value Ref Range    aPTT 100.3 (H) 23.0 - 35.7 sec    aPTT, therapeutic range   68 - 109 sec   CBC WITH AUTOMATED DIFF    Collection Time: 03/28/21  7:30 PM   Result Value Ref Range    WBC 6.3 4.1 - 11.1 K/uL    RBC 3.81 (L) 4.10 - 5.70 M/uL    HGB 10.5 (L) 12.1 - 17.0 g/dL    HCT 35.7 (L) 36.6 - 50.3 %    MCV 93.7 80.0 - 99.0 FL    MCH 27.6 26.0 - 34.0 PG    MCHC 29.4 (L) 30.0 - 36.5 g/dL    RDW 21.1 (H) 11.5 - 14.5 %    PLATELET 203 (L) 601 - 400 K/uL    MPV 11.6 8.9 - 12.9 FL    NRBC 0.0 0  WBC    ABSOLUTE NRBC 0.00 0.00 - 0.01 K/uL    NEUTROPHILS 86 (H) 32 - 75 %    LYMPHOCYTES 8 (L) 12 - 49 %    MONOCYTES 4 (L) 5 - 13 %    EOSINOPHILS 2 0 - 7 %    BASOPHILS 0 0 - 1 %    IMMATURE GRANULOCYTES 0 0.0 - 0.5 %    ABS. NEUTROPHILS 5.5 1.8 - 8.0 K/UL    ABS. LYMPHOCYTES 0.5 (L) 0.8 - 3.5 K/UL    ABS. MONOCYTES 0.2 0.0 - 1.0 K/UL    ABS. EOSINOPHILS 0.1 0.0 - 0.4 K/UL    ABS. BASOPHILS 0.0 0.0 - 0.1 K/UL    ABS. IMM. GRANS. 0.0 0.00 - 0.04 K/UL    DF AUTOMATED     METABOLIC PANEL, COMPREHENSIVE    Collection Time: 03/29/21  6:30 AM   Result Value Ref Range    Sodium 154 (H) 136 - 145 mmol/L    Potassium 4.1 3.5 - 5.1 mmol/L    Chloride 122 (H) 97 - 108 mmol/L    CO2 26 21 - 32 mmol/L    Anion gap 6 5 - 15 mmol/L    Glucose 112 (H) 65 - 100 mg/dL     (H) 6 - 20 mg/dL    Creatinine 3.19 (H) 0.70 - 1.30 mg/dL    BUN/Creatinine ratio 31 (H) 12 - 20      GFR est AA 23 (L) >60 ml/min/1.73m2    GFR est non-AA 19 (L) >60 ml/min/1.73m2    Calcium 9.0 8.5 - 10.1 mg/dL    Bilirubin, total 0.4 0.2 - 1.0 mg/dL    AST (SGOT) 81 (H) 15 - 37 U/L    ALT (SGPT) 57 12 - 78 U/L    Alk.  phosphatase 119 (H) 45 - 117 U/L    Protein, total 6.3 (L) 6.4 - 8.2 g/dL    Albumin 2.4 (L) 3.5 - 5.0 g/dL    Globulin 3.9 2.0 - 4.0 g/dL    A-G Ratio 0.6 (L) 1.1 - 2.2     PTT    Collection Time: 03/29/21  6:30 AM   Result Value Ref Range    aPTT 142.1 (HH) 23.0 - 35.7 sec    aPTT, therapeutic range   68 - 109 sec   GLUCOSE, POC    Collection Time: 03/29/21  8:14 AM   Result Value Ref Range    Glucose (POC) 123 (H) 65 - 100 mg/dL    Performed by Vicente Paul        Results     Procedure Component Value Units Date/Time    CULTURE, URINE [[id number]] Collected: 03/27/21 1700    Order Status: Completed Specimen: Urine Updated: 03/28/21 0758    COVID-19 RAPID TEST [[id number]] Collected: 03/27/21 0730    Order Status: Completed Specimen: Nasopharyngeal Updated: 03/27/21 0813     Specimen source Nasopharyngeal        COVID-19 rapid test Not Detected        Comment: Rapid Abbott ID Now   Rapid NAAT:  The specimen is NEGATIVE for SARS-CoV-2, the novel coronavirus associated with COVID-19. Negative results should be treated as presumptive and, if inconsistent with clinical signs and symptoms or necessary for patient management, should be tested with an alternative molecular assay. Negative results do not preclude SARS-CoV-2 infection and should not be used as the sole basis for patient management decisions. This test has been authorized by the FDA under   an Emergency Use Authorization (EUA) for use by authorized laboratories.  Fact sheet for Healthcare Providers: ConventionUpdate.co.nz Fact sheet for Patients: ConventionUpdate.co.nz   Methodology: Isothermal Nucleic Acid Amplification         CULTURE, BLOOD, PAIRED [[id number]] Collected: 03/26/21 2045    Order Status: Completed Specimen: Blood Updated: 03/29/21 0743     Special Requests: No Special Requests        Culture result: No growth 2 days              Labs:     Recent Labs     03/28/21  1930 03/28/21  1050   WBC 6.3 7.1   HGB 10.5* 10.5*   HCT 35.7* 35.7*   * 136*     Recent Labs     03/29/21  0630 03/28/21  1050 03/27/21  1300 03/26/21 2045 03/26/21 2045   * 157*  155* 155*   < > 154*   K 4.1 4.2  4.2 5.9*   < > 5.6*   * 125*  124* 125*   < > 116* CO2 26 23  22 17*   < > 18*   * 125*  127* 179*   < > 207*   CREA 3.19* 4.19*  4.22* 6.44*   < > 8.18*   * 165*  166* 76   < > 119*   CA 9.0 8.7  8.9 8.2*   < > 9.6   MG  --   --   --   --  5.6*   PHOS  --  4.6 7.8*  --   --     < > = values in this interval not displayed. Recent Labs     03/29/21  0630 03/28/21  1050 03/27/21  1300 03/26/21 2045   ALT 57 58  --  90*   * 105  --  131*   TBILI 0.4 0.6  --  0.6   TP 6.3* 6.5  --  8.6*   ALB 2.4* 2.5*  2.4* 2.3* 3.3*   GLOB 3.9 4.1*  --  5.3*     Recent Labs     03/29/21  0630 03/28/21  1930 03/28/21  1050   APTT 142.1* 100.3* 94.6*      No results for input(s): FE, TIBC, PSAT, FERR in the last 72 hours. No results found for: FOL, RBCF   No results for input(s): PH, PCO2, PO2 in the last 72 hours.   Recent Labs     03/27/21  0616 03/26/21 2045   CPK  --  763*   TROIQ 0.33* 0.57*     No results found for: CHOL, CHOLX, CHLST, CHOLV, HDL, HDLP, LDL, LDLC, DLDLP, TGLX, TRIGL, TRIGP, CHHD, CHHDX  Lab Results   Component Value Date/Time    Glucose (POC) 123 (H) 03/29/2021 08:14 AM    Glucose (POC) 84 03/27/2021 10:52 AM     Lab Results   Component Value Date/Time    Color Yellow 03/27/2021 01:55 AM    Appearance Turbid (A) 03/27/2021 01:55 AM    Specific gravity 1.019 03/27/2021 01:55 AM    pH (UA) 5.0 03/27/2021 01:55 AM    Protein 100 (A) 03/27/2021 01:55 AM    Glucose Negative 03/27/2021 01:55 AM    Ketone Negative 03/27/2021 01:55 AM    Bilirubin Negative 03/27/2021 01:55 AM    Urobilinogen 0.1 03/27/2021 01:55 AM    Nitrites Negative 03/27/2021 01:55 AM    Leukocyte Esterase Large (A) 03/27/2021 01:55 AM    Bacteria Negative 03/27/2021 01:55 AM    Bacteria Negative 03/27/2021 01:55 AM    WBC 20-50 03/27/2021 01:55 AM    WBC 20-50 03/27/2021 01:55 AM    RBC 20-50 03/27/2021 01:55 AM    RBC 20-50 03/27/2021 01:55 AM         Assessment:        A. fib with rapid ventricular rate  Improving with HR in 80s, up to 110 with moving    Acute kidney injury  Cr 3.19 (8.1 on admission)   (207 on admission)    Hyperkalemia  resolved    Hypernatremia  154    UTI    History of recent Covid    History of obstructive uropathy    Dementia    Functional quadriplegic    History of CVA    History of DVT    Hyperlipidemia    UTI      Plan:     Continue Aspirin   Continue Lipitor   Continue Ceftriaxone  Continue amiodarone drip, will be switched to oral  Continue heparin drip    Continue with Nephrology and cardiology consult    Repeat labs to check for JAE, electrolyte imbalances            Current Facility-Administered Medications:     amiodarone (CORDARONE) tablet 400 mg, 400 mg, Oral, BID, Florance Severance, MD, 400 mg at 03/29/21 0815    lactated Ringers infusion 1,000 mL, 1,000 mL, IntraVENous, CONTINUOUS, Josep Moon MD, Stopped at 03/27/21 0536    lactated Ringers infusion, 125 mL/hr, IntraVENous, CONTINUOUS, Carlene Moon MD, Last Rate: 125 mL/hr at 03/27/21 0346, 125 mL/hr at 03/27/21 0346    lactated Ringers infusion 1,000 mL, 1,000 mL, IntraVENous, CONTINUOUS, Josep Moon MD, Stopped at 03/27/21 0535    acetaminophen (TYLENOL) tablet 650 mg, 650 mg, Oral, Q6H PRN, 650 mg at 03/28/21 2221 **OR** acetaminophen (TYLENOL) suppository 650 mg, 650 mg, Rectal, Q6H PRN, Josep Moon MD    polyethylene glycol (MIRALAX) packet 17 g, 17 g, Oral, DAILY PRN, Josep Moon MD    ondansetron (ZOFRAN ODT) tablet 4 mg, 4 mg, Oral, Q8H PRN **OR** ondansetron (ZOFRAN) injection 4 mg, 4 mg, IntraVENous, Q6H PRN, Carlene Moon MD    heparin 25,000 units in D5W 250 ml infusion, 12-25 Units/kg/hr, IntraVENous, TITRATE, Florance Severance, MD, Last Rate: 10.2 mL/hr at 03/28/21 2302, 18 Units/kg/hr at 03/28/21 2302    heparin (porcine) 1,000 unit/mL injection 3,400 Units, 60 Units/kg, IntraVENous, PRN **OR** heparin (porcine) 1,000 unit/mL injection 1,700 Units, 30 Units/kg, IntraVENous, PRN, Florance Severance, MD, 1,700 Units at 03/28/21 0406    dextrose 5 % - 0.2% NaCl infusion, 150 mL/hr, IntraVENous, CONTINUOUS, Brendan Coreas MD, Last Rate: 150 mL/hr at 03/29/21 0845, 150 mL/hr at 03/29/21 0845    cefTRIAXone (ROCEPHIN) 1 g in sterile water (preservative free) 10 mL IV syringe, 1 g, IntraVENous, Q24H, Brendan Coreas MD, 1 g at 03/28/21 1505    aspirin tablet 325 mg, 325 mg, Oral, DAILY, Carlene Moon MD, 325 mg at 03/28/21 1124    atorvastatin (LIPITOR) tablet 40 mg, 40 mg, Oral, QHS, Carlene Moon MD, 40 mg at 03/28/21 2223    nitroglycerin (NITROBID) 2 % ointment 1 Inch, 1 Inch, Topical, Q4H PRN, Carlene Moon MD    lactated Ringers infusion 1,000 mL, 1,000 mL, IntraVENous, CONTINUOUS, Carlene Moon MD, 1,000 mL at 03/26/21 0531

## 2021-03-29 NOTE — PROGRESS NOTES
OCCUPATIONAL THERAPY EVALUATION  Patient: Felicita Navarrete (74 y.o. male)  Date: 3/29/2021  Primary Diagnosis: Acute renal failure (ARF) ([facility] [address] [N17.9]  Hypovolemia [E86.1]  Atrial fibrillation, rapid ([facility] [address] [I48.91]  JAE (acute kidney injury) ([facility] [address] [N17.9]        Precautions:        ASSESSMENT  66 yr old male adm 3/26/21 from [facility] for decreased taking meds, eating, drinking since having covid 19, acute renal failure, afib, JAE. See PMH below. Based on the objective data described below, the patient presents significant impairments in cognition, alertness, attention, not following commands, ROM, strength, endurance, activity tolerance impacting all aspects of functional mob, self care, needing total A, which appears to be his functional baseline. Pt rests in fetal like position w/knees flexed, arms bent in towards him. Pt was oxfirst name only. Assisted nursing w/cleaning pt BM, needing total Ax2 for rolling L/R, pulling up to [facility], bowel hygiene. Repositioned w/ pillows b/t knees, pillows under left side for right sidelying, bunny boots in place. Pt w/minimal observed  UE AROM but PROM is grossly [facility] for bathing, repositioning, nursing care. Pt was total A to wash face and use green swab to clean blood from lips, mouth. Pt does not appear appropriate for skilled OT at this time and would REC return back to LTC as prior to admission. Nursing staff can continue w/ PROM as tolerated, repositioning, skin checks    Current Level of Function Impacting Discharge (ADLs/self-care): total A in all all aspects of functional mob,, self care          PLAN :    Recommendation for discharge: (in order for the patient to meet his/her long term goals)  Back to long term care    This discharge recommendation:  Has been made in collaboration with the attending provider and/or case management           SUBJECTIVE:   Patient stated oh my lord my knees.     OBJECTIVE DATA SUMMARY:   HISTORY:   Past Medical History:   Diagnosis Date Anemia     BPH (benign prostatic hyperplasia)     CKD (chronic kidney disease)     CVA (cerebral vascular accident) ([facility] [address]     Dementia ([facility] [address]     DVT (deep venous thrombosis) ([facility])     GERD (gastroesophageal reflux disease)     Glaucoma     Hyperlipemia    No past surgical history on file. Expanded or extensive additional review of patient history:     Home Situation  Home Environment: Long term care([facility])  Support Systems: (nursing home staff)  Patient Expects to be Discharged to[de-identified] (long term care)  Current DME Used/Available at Home: (unknown)    PLOF: Pt unable to provide any PLOF  but appears to have been total care      EXAMINATION OF PERFORMANCE DEFICITS:  Cognitive/Behavioral Status:  Neurologic State: Confused;Drowsy;Irritable  Orientation Level: Disoriented to place; Disoriented to situation;Disoriented to time;Oriented to person(oxfirst name)  Cognition: Decreased attention/concentration; No command following                        Range of Motion:    AROM: Grossly decreased, non-functional  PROM: Generally decreased, functional                      Strength:    Strength: Grossly decreased, non-functional                             Tone & Sensation:    Tone: Abnormal                              Functional Mobility and Transfers for ADLs:  Bed Mobility:  Rolling: Total assistance;Assist x2  Scooting: Total assistance;Assist x2         ADL Assessment:       Oral Facial Hygiene/Grooming: Total assistance                   Toileting: Total assistance                ADL Intervention and task modifications:       Grooming  Grooming Assistance: Total assistance(dependent)  Position Performed: Long sitting on bed  Washing Face: Total assistance (dependent)  Brushing Teeth: Total assistance (dependent)(green swab, mouth care)                        Toileting  Toileting Assistance: Total assistance(dependent)  Bowel Hygiene:  Total assistance (dependent)         [address] [place] [address] [address] [address] AM-PAC \"6 Clicks\" Daily Activity Inpatient Short Form  How much help from another person does the patient currently need. .. Total; A Lot A Little None   1. Putting on and taking off regular lower body clothing? [x]  1 []  2 []  3 []  4   2. Bathing (including washing, rinsing, drying)? [x]  1 []  2 []  3 []  4   3. Toileting, which includes using toilet, bedpan or urinal? [x] 1 []  2 []  3 []  4   4. Putting on and taking off regular upper body clothing? [x]  1 []  2 []  3 []  4   5. Taking care of personal grooming such as brushing teeth? [x]  1 []  2 []  3 []  4   6. Eating meals? []  1 []  2 []  3 []  4   © 2007, Trustees of [address] [place] [address] [address] [id number], under license to [employer]. All rights reserved     Score: 6/24     Interpretation of Tool:  Represents clinically-significant functional categories (i.e. Activities of daily living). Percentage of Impairment CH    0%   CI    1-19% CJ    20-39% CK    40-59% CL    60-79% CM    80-99% CN     100%   [facility]  Score 6-24 24 23 20-22 15-19 10-14 7-9 6        Occupational Therapy Evaluation Charge Determination   History Examination Decision-Making   LOW Complexity : Brief history review  LOW Complexity : 1-3 performance deficits relating to physical, cognitive , or psychosocial skils that result in activity limitations and / or participation restrictions  HIGH Complexity : Patient presents with comorbidities that affect occupational performance. Signifigant modification of tasks or assistance (eg, physical or verbal) with assessment (s) is necessary to enable patient to complete evaluation       Based on the above components, the patient evaluation is determined to be of the following complexity level: LOW   Pain Rating:  Pt did say \"oh lord my knees\" but not rated    Activity Tolerance:   Poor  Please refer to the flowsheet for vital signs taken during this treatment.     After treatment patient left in no apparent distress:    Supine in bed, Heels elevated for pressure relief, Patient positioned in right  sidelying for pressure relief, Call bell within reach, and Bed / chair alarm activated    COMMUNICATION/EDUCATION:   The patients plan of care was discussed with: Physical therapist and Registered nurse. Patient is unable to participate in goal setting and plan of care.       Thank you for this referral.  Klaus Gruber  Time Calculation: 25 mins

## 2021-03-29 NOTE — PROGRESS NOTES
General Daily Progress Note          Patient Name:   [facility]       YOB: 1945       Age:  66 y.o. Admit Date: 3/26/2021      Subjective:     75yom with a history of dementia, stroke, COVID, DVT, CKD presented with lethargy and weakness and was diagnosed with JAE and hyperkalemia. Today, patient seen resting in bed and nonconversant (baseline) with NG tube in place with heparin drip. Per cardiology, A fib more controlled today with HR in the 80s on amiodarone. Patient will be switched to oral today. Of note, patient producing dark yellow urine. Objective:     Visit Vitals  /67 (BP 1 Location: Right upper arm, BP Patient Position: At rest)   Pulse 89   Temp 98 °F (36.7 °C)   Resp 20   Ht 6' 2.02\" (1.88 m)   Wt 69.1 kg (152 lb 5.4 oz)   SpO2 92%   BMI 19.55 kg/m²        Recent Results (from the past 24 hour(s))   PTT    Collection Time: 03/28/21  7:30 PM   Result Value Ref Range    aPTT 100.3 (H) 23.0 - 35.7 sec    aPTT, therapeutic range   68 - 109 sec   CBC WITH AUTOMATED DIFF    Collection Time: 03/28/21  7:30 PM   Result Value Ref Range    WBC 6.3 4.1 - 11.1 K/uL    RBC 3.81 (L) 4.10 - 5.70 M/uL    HGB 10.5 (L) 12.1 - 17.0 g/dL    HCT 35.7 (L) 36.6 - 50.3 %    MCV 93.7 80.0 - 99.0 FL    MCH 27.6 26.0 - 34.0 PG    MCHC 29.4 (L) 30.0 - 36.5 g/dL    RDW 21.1 (H) 11.5 - 14.5 %    PLATELET 007 (L) 530 - 400 K/uL    MPV 11.6 8.9 - 12.9 FL    NRBC 0.0 0  WBC    ABSOLUTE NRBC 0.00 0.00 - 0.01 K/uL    NEUTROPHILS 86 (H) 32 - 75 %    LYMPHOCYTES 8 (L) 12 - 49 %    MONOCYTES 4 (L) 5 - 13 %    EOSINOPHILS 2 0 - 7 %    BASOPHILS 0 0 - 1 %    IMMATURE GRANULOCYTES 0 0.0 - 0.5 %    ABS. NEUTROPHILS 5.5 1.8 - 8.0 K/UL    ABS. LYMPHOCYTES 0.5 (L) 0.8 - 3.5 K/UL    ABS. MONOCYTES 0.2 0.0 - 1.0 K/UL    ABS. EOSINOPHILS 0.1 0.0 - 0.4 K/UL    ABS. BASOPHILS 0.0 0.0 - 0.1 K/UL    ABS. IMM.  GRANS. 0.0 0.00 - 0.04 K/UL    DF AUTOMATED     METABOLIC PANEL, COMPREHENSIVE Collection Time: 03/29/21  6:30 AM   Result Value Ref Range    Sodium 154 (H) 136 - 145 mmol/L    Potassium 4.1 3.5 - 5.1 mmol/L    Chloride 122 (H) 97 - 108 mmol/L    CO2 26 21 - 32 mmol/L    Anion gap 6 5 - 15 mmol/L    Glucose 112 (H) 65 - 100 mg/dL     (H) 6 - 20 mg/dL    Creatinine 3.19 (H) 0.70 - 1.30 mg/dL    BUN/Creatinine ratio 31 (H) 12 - 20      GFR est AA 23 (L) >60 ml/min/1.73m2    GFR est non-AA 19 (L) >60 ml/min/1.73m2    Calcium 9.0 8.5 - 10.1 mg/dL    Bilirubin, total 0.4 0.2 - 1.0 mg/dL    AST (SGOT) 81 (H) 15 - 37 U/L    ALT (SGPT) 57 12 - 78 U/L    Alk. phosphatase 119 (H) 45 - 117 U/L    Protein, total 6.3 (L) 6.4 - 8.2 g/dL    Albumin 2.4 (L) 3.5 - 5.0 g/dL    Globulin 3.9 2.0 - 4.0 g/dL    A-G Ratio 0.6 (L) 1.1 - 2.2     PTT    Collection Time: 03/29/21  6:30 AM   Result Value Ref Range    aPTT 142.1 (HH) 23.0 - 35.7 sec    aPTT, therapeutic range   68 - 109 sec   GLUCOSE, POC    Collection Time: 03/29/21  8:14 AM   Result Value Ref Range    Glucose (POC) 123 (H) 65 - 100 mg/dL    Performed by Gay Díaz      [unfilled]      Review of Systems    Unable to obtain      Physical Exam:      Constitutional: Asleep   Head: Normocephalic and atraumatic. Eyes: Pupils are equal, round, and reactive to light. EOM are normal.   Cardiovascular: Normal rate, regular rhythm and normal heart sounds. Pulmonary/Chest: Breath sounds normal. No wheezes. No rales. Exhibits no tenderness. Abdominal: Soft. Bowel sounds are normal. There is no abdominal tenderness. There is no rebound and no guarding. Musculoskeletal: Normal range of motion. Neurological: Contracted leg, unintelligible speech, functional quadriplegic  Skin: wounds on right hip, left medial knee, gluteal cleft     XR CHEST PORT   Final Result   NG tube position as above. Worsening lung aeration. CT ABD PELV WO CONT   Final Result   No acute abdominopelvic abnormality. Nonobstructing bilateral renal   calculi. Correlate for constipation given increased stool burden. End-stage   degenerative changes of the hips with postoperative change in the right   acetabulum. Pulmonary interstitial abnormality partially visualized in the acute   setting would suggest infection/inflammation or edema versus a combination of   these and in any case with early airspace component at the right lung base. CT HEAD WO CONT   Final Result   No acute intracranial abnormality. 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 Mild   generalized atrophy with nonspecific white matter abnormality that may indicate   chronic small vessel disease. 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 XR CHEST PORT   Final Result   Rotated exam. Diffuse interstitial abnormality and with question of   some superimposed nodular areas. In the acute setting edema, infection or   combination of these is considered however, especially given the possible   nodular opacities, follow-up to radiographic resolution and/or further   evaluation with chest CT if symptoms/findings do not resolve as expected with   treatment, or if indicated otherwise.                 Recent Results (from the past 24 hour(s))   PTT    Collection Time: 03/28/21  7:30 PM   Result Value Ref Range    aPTT 100.3 (H) 23.0 - 35.7 sec    aPTT, therapeutic range   68 - 109 sec   CBC WITH AUTOMATED DIFF    Collection Time: 03/28/21  7:30 PM   Result Value Ref Range    WBC 6.3 4.1 - 11.1 K/uL    RBC 3.81 (L) 4.10 - 5.70 M/uL    HGB 10.5 (L) 12.1 - 17.0 g/dL    HCT 35.7 (L) 36.6 - 50.3 %    MCV 93.7 80.0 - 99.0 FL    MCH 27.6 26.0 - 34.0 PG    MCHC 29.4 (L) 30.0 - 36.5 g/dL    RDW 21.1 (H) 11.5 - 14.5 %    PLATELET 732 (L) 935 - 400 K/uL    MPV 11.6 8.9 - 12.9 FL    NRBC 0.0 0  WBC    ABSOLUTE NRBC 0.00 0.00 - 0.01 K/uL    NEUTROPHILS 86 (H) 32 - 75 %    LYMPHOCYTES 8 (L) 12 - 49 %    MONOCYTES 4 (L) 5 - 13 %    EOSINOPHILS 2 0 - 7 %    BASOPHILS 0 0 - 1 %    IMMATURE GRANULOCYTES 0 0.0 - 0.5 %    ABS. NEUTROPHILS 5.5 1.8 - 8.0 K/UL    ABS. LYMPHOCYTES 0.5 (L) 0.8 - 3.5 K/UL    ABS. MONOCYTES 0.2 0.0 - 1.0 K/UL    ABS. EOSINOPHILS 0.1 0.0 - 0.4 K/UL    ABS. BASOPHILS 0.0 0.0 - 0.1 K/UL    ABS. IMM. GRANS. 0.0 0.00 - 0.04 K/UL    DF AUTOMATED     METABOLIC PANEL, COMPREHENSIVE    Collection Time: 03/29/21  6:30 AM   Result Value Ref Range    Sodium 154 (H) 136 - 145 mmol/L    Potassium 4.1 3.5 - 5.1 mmol/L    Chloride 122 (H) 97 - 108 mmol/L    CO2 26 21 - 32 mmol/L    Anion gap 6 5 - 15 mmol/L    Glucose 112 (H) 65 - 100 mg/dL     (H) 6 - 20 mg/dL    Creatinine 3.19 (H) 0.70 - 1.30 mg/dL    BUN/Creatinine ratio 31 (H) 12 - 20      GFR est AA 23 (L) >60 ml/min/1.73m2    GFR est non-AA 19 (L) >60 ml/min/1.73m2    Calcium 9.0 8.5 - 10.1 mg/dL    Bilirubin, total 0.4 0.2 - 1.0 mg/dL    AST (SGOT) 81 (H) 15 - 37 U/L    ALT (SGPT) 57 12 - 78 U/L    Alk.  phosphatase 119 (H) 45 - 117 U/L    Protein, total 6.3 (L) 6.4 - 8.2 g/dL    Albumin 2.4 (L) 3.5 - 5.0 g/dL    Globulin 3.9 2.0 - 4.0 g/dL    A-G Ratio 0.6 (L) 1.1 - 2.2     PTT    Collection Time: 03/29/21  6:30 AM   Result Value Ref Range    aPTT 142.1 (HH) 23.0 - 35.7 sec    aPTT, therapeutic range   68 - 109 sec   GLUCOSE, POC    Collection Time: 03/29/21  8:14 AM   Result Value Ref Range    Glucose (POC) 123 (H) 65 - 100 mg/dL    Performed by Jm Perez        Results     Procedure Component Value Units Date/Time    CULTURE, URINE [[id number]] Collected: 03/27/21 1700    Order Status: Completed Specimen: Urine Updated: 03/29/21 1114     Special Requests: No Special Requests        Culture result: No Growth (<1000 cfu/mL)       COVID-19 RAPID TEST [[id number]] Collected: 03/27/21 0730    Order Status: Completed Specimen: Nasopharyngeal Updated: 03/27/21 0813     Specimen source Nasopharyngeal        COVID-19 rapid test Not Detected        Comment: Rapid Abbott ID Now   Rapid NAAT:  The specimen is NEGATIVE for SARS-CoV-2, the novel coronavirus associated with COVID-19. Negative results should be treated as presumptive and, if inconsistent with clinical signs and symptoms or necessary for patient management, should be tested with an alternative molecular assay. Negative results do not preclude SARS-CoV-2 infection and should not be used as the sole basis for patient management decisions. This test has been authorized by the FDA under   an Emergency Use Authorization (EUA) for use by authorized laboratories. Fact sheet for Healthcare Providers: [employer] Fact sheet for Patients: [employer]com   Methodology: Isothermal Nucleic Acid Amplification         CULTURE, BLOOD, PAIRED [[id number]] Collected: 03/26/21 2045    Order Status: Completed Specimen: Blood Updated: 03/29/21 0743     Special Requests: No Special Requests        Culture result: No growth 2 days              Labs:     Recent Labs     03/28/21 1930 03/28/21  1050   WBC 6.3 7.1   HGB 10.5* 10.5*   HCT 35.7* 35.7*   * 136*     Recent Labs     03/29/21 0630 03/28/21  1050 03/27/21  1300 03/26/21 2045 03/26/21 2045   * 157*  155* 155*   < > 154*   K 4.1 4.2  4.2 5.9*   < > 5.6*   * 125*  124* 125*   < > 116*   CO2 26 23  22 17*   < > 18*   * 125*  127* 179*   < > 207*   CREA 3.19* 4.19*  4.22* 6.44*   < > 8.18*   * 165*  166* 76   < > 119*   CA 9.0 8.7  8.9 8.2*   < > 9.6   MG  --   --   --   --  5.6*   PHOS  --  4.6 7.8*  --   --     < > = values in this interval not displayed.      Recent Labs     03/29/21 0630 03/28/21 1050 03/27/21  1300 03/26/21 2045   ALT 57 58  --  90*   * 105  --  131*   TBILI 0.4 0.6  --  0.6   TP 6.3* 6.5  --  8.6*   ALB 2.4* 2.5*  2.4* 2.3* 3.3*   GLOB 3.9 4.1*  --  5.3*     Recent Labs     03/29/21  0630 03/28/21  1930 03/28/21  1050   APTT 142.1* 100.3* 94.6*      No results for input(s): FE, TIBC, PSAT, FERR in the last 72 hours. No results found for: FOL, RBCF   No results for input(s): PH, PCO2, PO2 in the last 72 hours.   Recent Labs     03/27/21  0616 03/26/21 2045   CPK  --  763*   TROIQ 0.33* 0.57*     No results found for: CHOL, CHOLX, CHLST, CHOLV, HDL, HDLP, LDL, LDLC, DLDLP, TGLX, TRIGL, TRIGP, CHHD, CHHDX  Lab Results   Component Value Date/Time    Glucose (POC) 123 (H) 03/29/2021 08:14 AM    Glucose (POC) 84 03/27/2021 10:52 AM     Lab Results   Component Value Date/Time    Color Yellow 03/27/2021 01:55 AM    Appearance Turbid (A) 03/27/2021 01:55 AM    Specific gravity 1.019 03/27/2021 01:55 AM    pH (UA) 5.0 03/27/2021 01:55 AM    Protein 100 (A) 03/27/2021 01:55 AM    Glucose Negative 03/27/2021 01:55 AM    Ketone Negative 03/27/2021 01:55 AM    Bilirubin Negative 03/27/2021 01:55 AM    Urobilinogen 0.1 03/27/2021 01:55 AM    Nitrites Negative 03/27/2021 01:55 AM    Leukocyte Esterase Large (A) 03/27/2021 01:55 AM    Bacteria Negative 03/27/2021 01:55 AM    Bacteria Negative 03/27/2021 01:55 AM    WBC 20-50 03/27/2021 01:55 AM    WBC 20-50 03/27/2021 01:55 AM    RBC 20-50 03/27/2021 01:55 AM    RBC 20-50 03/27/2021 01:55 AM         Assessment:        A. fib with rapid ventricular rate  Improving with HR in 80s, up to 110 with moving    Acute kidney injury  Cr 3.19 (8.1 on admission)   (207 on admission)    Hyperkalemia  resolved    Hypernatremia  154    UTI    History of recent Covid    History of obstructive uropathy    Dementia    Functional quadriplegic    History of CVA    History of DVT    Hyperlipidemia    UTI      Plan:     Continue Aspirin   Continue Lipitor   Continue Ceftriaxone  Continue amiodarone drip, will be switched to oral  Continue heparin drip    Continue with Nephrology and cardiology consult    Repeat labs to check for JAE, electrolyte imbalances            Current Facility-Administered Medications:     amiodarone (CORDARONE) tablet 400 mg, 400 mg, Oral, BID, Kayla Amor MD, 400 mg at 03/29/21 0815    lactated Ringers infusion 1,000 mL, 1,000 mL, IntraVENous, CONTINUOUS, Patrice Moon MD, Stopped at 03/27/21 0536    lactated Ringers infusion, 125 mL/hr, IntraVENous, CONTINUOUS, Carlene Moon MD, Last Rate: 125 mL/hr at 03/27/21 0346, 125 mL/hr at 03/27/21 0346    lactated Ringers infusion 1,000 mL, 1,000 mL, IntraVENous, CONTINUOUS, Patrice Moon MD, Stopped at 03/27/21 0535    acetaminophen (TYLENOL) tablet 650 mg, 650 mg, Oral, Q6H PRN, 650 mg at 03/28/21 2221 **OR** acetaminophen (TYLENOL) suppository 650 mg, 650 mg, Rectal, Q6H PRN, Patrice Moon MD    polyethylene glycol (MIRALAX) packet 17 g, 17 g, Oral, DAILY PRN, Patrice Moon MD    ondansetron (ZOFRAN ODT) tablet 4 mg, 4 mg, Oral, Q8H PRN **OR** ondansetron (ZOFRAN) injection 4 mg, 4 mg, IntraVENous, Q6H PRN, Carlene Moon MD    heparin 25,000 units in D5W 250 ml infusion, 12-25 Units/kg/hr, IntraVENous, TITRATE, Kayla Amor MD, Last Rate: 8.5 mL/hr at 03/29/21 0857, 15 Units/kg/hr at 03/29/21 0857    heparin (porcine) 1,000 unit/mL injection 3,400 Units, 60 Units/kg, IntraVENous, PRN **OR** heparin (porcine) 1,000 unit/mL injection 1,700 Units, 30 Units/kg, IntraVENous, PRN, Kayla Amor MD, 1,700 Units at 03/28/21 0406    dextrose 5 % - 0.2% NaCl infusion, 150 mL/hr, IntraVENous, CONTINUOUS, Brendan Coreas MD, Last Rate: 150 mL/hr at 03/29/21 0845, 150 mL/hr at 03/29/21 0845    cefTRIAXone (ROCEPHIN) 1 g in sterile water (preservative free) 10 mL IV syringe, 1 g, IntraVENous, Q24H, Brendan Coreas MD, 1 g at 03/28/21 1505    aspirin tablet 325 mg, 325 mg, Oral, DAILY, Carlene Moon MD, 325 mg at 03/29/21 1019    atorvastatin (LIPITOR) tablet 40 mg, 40 mg, Oral, QHS, Carlene Moon MD, 40 mg at 03/28/21 2223    nitroglycerin (NITROBID) 2 % ointment 1 Inch, 1 Inch, Topical, Q4H PRN, Carlene Moon MD    lactated Ringers infusion 1,000 mL, 1,000 mL, IntraVENous, CONTINUOUS, Carlene Moon MD, 1,000 mL at 03/26/21 3005

## 2021-03-29 NOTE — PROGRESS NOTES
Attempted bedside sw evaluation. Pt adamantly refused PO trials, pushes clinicians hands away with attempts. Dried blood colored secretions in oral cavity noted, nsg reports pt resists oral care. NG tube with TF is present. Per hard chart, pt was on puree/nectar diet prior to current admission. Will cont to follow. Pt not yet appropriate for PO intake.

## 2021-03-29 NOTE — PROGRESS NOTES
General Daily Progress Note Patient Name:  
Hank Mon YOB: 1945 Age: 
66 y.o. Admit Date: 3/26/2021 Subjective:  
 
 
BP: 120/66 P: 84 Sodium PTT Cardiology Objective:  
 
Visit Vitals /66 (BP 1 Location: Right upper arm, BP Patient Position: Lying left side) Pulse 84 Temp 97.8 °F (36.6 °C) Resp 20 Ht 6' 2.02\" (1.88 m) Wt 152 lb 5.4 oz (69.1 kg) SpO2 95% BMI 19.55 kg/m² Recent Results (from the past 24 hour(s)) METABOLIC PANEL, COMPREHENSIVE Collection Time: 03/28/21 10:50 AM  
Result Value Ref Range Sodium 155 (H) 136 - 145 mmol/L Potassium 4.2 3.5 - 5.1 mmol/L Chloride 124 (H) 97 - 108 mmol/L  
 CO2 22 21 - 32 mmol/L Anion gap 9 5 - 15 mmol/L Glucose 166 (H) 65 - 100 mg/dL  (H) 6 - 20 mg/dL Creatinine 4.22 (H) 0.70 - 1.30 mg/dL BUN/Creatinine ratio 30 (H) 12 - 20 GFR est AA 17 (L) >60 ml/min/1.73m2 GFR est non-AA 14 (L) >60 ml/min/1.73m2 Calcium 8.9 8.5 - 10.1 mg/dL Bilirubin, total 0.6 0.2 - 1.0 mg/dL AST (SGOT) 78 (H) 15 - 37 U/L  
 ALT (SGPT) 58 12 - 78 U/L Alk. phosphatase 105 45 - 117 U/L Protein, total 6.5 6.4 - 8.2 g/dL Albumin 2.4 (L) 3.5 - 5.0 g/dL Globulin 4.1 (H) 2.0 - 4.0 g/dL A-G Ratio 0.6 (L) 1.1 - 2.2    
CBC WITH AUTOMATED DIFF Collection Time: 03/28/21 10:50 AM  
Result Value Ref Range WBC 7.1 4.1 - 11.1 K/uL  
 RBC 3.81 (L) 4.10 - 5.70 M/uL  
 HGB 10.5 (L) 12.1 - 17.0 g/dL HCT 35.7 (L) 36.6 - 50.3 % MCV 93.7 80.0 - 99.0 FL  
 MCH 27.6 26.0 - 34.0 PG  
 MCHC 29.4 (L) 30.0 - 36.5 g/dL RDW 21.0 (H) 11.5 - 14.5 % PLATELET 151 (L) 528 - 400 K/uL MPV 10.6 8.9 - 12.9 FL  
 NEUTROPHILS 90 (H) 32 - 75 % LYMPHOCYTES 4 (L) 12 - 49 % MONOCYTES 5 5 - 13 % EOSINOPHILS 1 0 - 7 % BASOPHILS 0 0 - 1 % IMMATURE GRANULOCYTES 0 0.0 - 0.5 % ABS. NEUTROPHILS 6.4 1.8 - 8.0 K/UL  
 ABS.  LYMPHOCYTES 0.3 (L) 0.8 - 3.5 K/UL  
 ABS. MONOCYTES 0.4 0.0 - 1.0 K/UL  
 ABS. EOSINOPHILS 0.1 0.0 - 0.4 K/UL  
 ABS. BASOPHILS 0.0 0.0 - 0.1 K/UL  
 ABS. IMM. GRANS. 0.0 0.00 - 0.04 K/UL  
 DF AUTOMATED RENAL FUNCTION PANEL Collection Time: 03/28/21 10:50 AM  
Result Value Ref Range Sodium 157 (H) 136 - 145 mmol/L Potassium 4.2 3.5 - 5.1 mmol/L Chloride 125 (H) 97 - 108 mmol/L  
 CO2 23 21 - 32 mmol/L Anion gap 9 5 - 15 mmol/L Glucose 165 (H) 65 - 100 mg/dL  (H) 6 - 20 mg/dL Creatinine 4.19 (H) 0.70 - 1.30 mg/dL BUN/Creatinine ratio 30 (H) 12 - 20 GFR est AA 17 (L) >60 ml/min/1.73m2 GFR est non-AA 14 (L) >60 ml/min/1.73m2 Calcium 8.7 8.5 - 10.1 mg/dL Phosphorus 4.6 2.6 - 4.7 mg/dL Albumin 2.5 (L) 3.5 - 5.0 g/dL PTT Collection Time: 03/28/21 10:50 AM  
Result Value Ref Range aPTT 94.6 (H) 23.0 - 35.7 sec  
 aPTT, therapeutic range   68 - 109 sec PTT Collection Time: 03/28/21  7:30 PM  
Result Value Ref Range aPTT 100.3 (H) 23.0 - 35.7 sec  
 aPTT, therapeutic range   68 - 109 sec CBC WITH AUTOMATED DIFF Collection Time: 03/28/21  7:30 PM  
Result Value Ref Range WBC 6.3 4.1 - 11.1 K/uL  
 RBC 3.81 (L) 4.10 - 5.70 M/uL  
 HGB 10.5 (L) 12.1 - 17.0 g/dL HCT 35.7 (L) 36.6 - 50.3 % MCV 93.7 80.0 - 99.0 FL  
 MCH 27.6 26.0 - 34.0 PG  
 MCHC 29.4 (L) 30.0 - 36.5 g/dL RDW 21.1 (H) 11.5 - 14.5 % PLATELET 717 (L) 244 - 400 K/uL MPV 11.6 8.9 - 12.9 FL  
 NRBC 0.0 0  WBC ABSOLUTE NRBC 0.00 0.00 - 0.01 K/uL NEUTROPHILS 86 (H) 32 - 75 % LYMPHOCYTES 8 (L) 12 - 49 % MONOCYTES 4 (L) 5 - 13 % EOSINOPHILS 2 0 - 7 % BASOPHILS 0 0 - 1 % IMMATURE GRANULOCYTES 0 0.0 - 0.5 % ABS. NEUTROPHILS 5.5 1.8 - 8.0 K/UL  
 ABS. LYMPHOCYTES 0.5 (L) 0.8 - 3.5 K/UL  
 ABS. MONOCYTES 0.2 0.0 - 1.0 K/UL  
 ABS. EOSINOPHILS 0.1 0.0 - 0.4 K/UL  
 ABS. BASOPHILS 0.0 0.0 - 0.1 K/UL  
 ABS. IMM. GRANS. 0.0 0.00 - 0.04 K/UL  
 DF AUTOMATED METABOLIC PANEL, COMPREHENSIVE Collection Time: 03/29/21  6:30 AM  
Result Value Ref Range Sodium 154 (H) 136 - 145 mmol/L Potassium 4.1 3.5 - 5.1 mmol/L Chloride 122 (H) 97 - 108 mmol/L  
 CO2 26 21 - 32 mmol/L Anion gap 6 5 - 15 mmol/L Glucose 112 (H) 65 - 100 mg/dL  (H) 6 - 20 mg/dL Creatinine 3.19 (H) 0.70 - 1.30 mg/dL BUN/Creatinine ratio 31 (H) 12 - 20 GFR est AA 23 (L) >60 ml/min/1.73m2 GFR est non-AA 19 (L) >60 ml/min/1.73m2 Calcium 9.0 8.5 - 10.1 mg/dL Bilirubin, total 0.4 0.2 - 1.0 mg/dL AST (SGOT) 81 (H) 15 - 37 U/L  
 ALT (SGPT) 57 12 - 78 U/L Alk. phosphatase 119 (H) 45 - 117 U/L Protein, total 6.3 (L) 6.4 - 8.2 g/dL Albumin 2.4 (L) 3.5 - 5.0 g/dL Globulin 3.9 2.0 - 4.0 g/dL A-G Ratio 0.6 (L) 1.1 - 2.2 PTT Collection Time: 03/29/21  6:30 AM  
Result Value Ref Range aPTT 142.1 (HH) 23.0 - 35.7 sec  
 aPTT, therapeutic range   68 - 109 sec GLUCOSE, POC Collection Time: 03/29/21  8:14 AM  
Result Value Ref Range Glucose (POC) 123 (H) 65 - 100 mg/dL Performed by Clara Morales [unfilled] Review of Systems Unable to obtain Physical Exam:   
 
Constitutional: Awake not oriented to time and space HENT:  
Head: Normocephalic and atraumatic. Eyes: Pupils are equal, round, and reactive to light. EOM are normal.  
Cardiovascular: Normal rate, regular rhythm and normal heart sounds. Pulmonary/Chest: Breath sounds normal. No wheezes. No rales. Exhibits no tenderness. Abdominal: Soft. Bowel sounds are normal. There is no abdominal tenderness. There is no rebound and no guarding. Musculoskeletal: Normal range of motion. Neurological: Contracted leg XR CHEST PORT Final Result NG tube position as above. Worsening lung aeration. CT ABD PELV WO CONT Final Result No acute abdominopelvic abnormality. Nonobstructing bilateral renal  
calculi. Correlate for constipation given increased stool burden. End-stage  
degenerative changes of the hips with postoperative change in the right  
acetabulum. Pulmonary interstitial abnormality partially visualized in the acute  
setting would suggest infection/inflammation or edema versus a combination of  
these and in any case with early airspace component at the right lung base. CT HEAD WO CONT Final Result No acute intracranial abnormality. Prior infarct of the left  
parietal cortex and bilateral cerebellar hemispheres as described. Mild  
generalized atrophy with nonspecific white matter abnormality that may indicate  
chronic small vessel disease. 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 Diffuse interstitial abnormality and with question of  
some superimposed nodular areas. In the acute setting edema, infection or  
combination of these is considered however, especially given the possible  
nodular opacities, follow-up to radiographic resolution and/or further  
evaluation with chest CT if symptoms/findings do not resolve as expected with  
treatment, or if indicated otherwise. Recent Results (from the past 24 hour(s)) METABOLIC PANEL, COMPREHENSIVE Collection Time: 03/28/21 10:50 AM  
Result Value Ref Range Sodium 155 (H) 136 - 145 mmol/L Potassium 4.2 3.5 - 5.1 mmol/L Chloride 124 (H) 97 - 108 mmol/L  
 CO2 22 21 - 32 mmol/L Anion gap 9 5 - 15 mmol/L Glucose 166 (H) 65 - 100 mg/dL  (H) 6 - 20 mg/dL Creatinine 4.22 (H) 0.70 - 1.30 mg/dL BUN/Creatinine ratio 30 (H) 12 - 20 GFR est AA 17 (L) >60 ml/min/1.73m2 GFR est non-AA 14 (L) >60 ml/min/1.73m2 Calcium 8.9 8.5 - 10.1 mg/dL Bilirubin, total 0.6 0.2 - 1.0 mg/dL AST (SGOT) 78 (H) 15 - 37 U/L  
 ALT (SGPT) 58 12 - 78 U/L Alk. phosphatase 105 45 - 117 U/L Protein, total 6.5 6.4 - 8.2 g/dL Albumin 2.4 (L) 3.5 - 5.0 g/dL Globulin 4.1 (H) 2.0 - 4.0 g/dL  A-G Ratio 0.6 (L) 1.1 - 2.2    
CBC WITH AUTOMATED DIFF Collection Time: 03/28/21 10:50 AM  
Result Value Ref Range WBC 7.1 4.1 - 11.1 K/uL  
 RBC 3.81 (L) 4.10 - 5.70 M/uL  
 HGB 10.5 (L) 12.1 - 17.0 g/dL HCT 35.7 (L) 36.6 - 50.3 % MCV 93.7 80.0 - 99.0 FL  
 MCH 27.6 26.0 - 34.0 PG  
 MCHC 29.4 (L) 30.0 - 36.5 g/dL RDW 21.0 (H) 11.5 - 14.5 % PLATELET 803 (L) 342 - 400 K/uL MPV 10.6 8.9 - 12.9 FL  
 NEUTROPHILS 90 (H) 32 - 75 % LYMPHOCYTES 4 (L) 12 - 49 % MONOCYTES 5 5 - 13 % EOSINOPHILS 1 0 - 7 % BASOPHILS 0 0 - 1 % IMMATURE GRANULOCYTES 0 0.0 - 0.5 % ABS. NEUTROPHILS 6.4 1.8 - 8.0 K/UL  
 ABS. LYMPHOCYTES 0.3 (L) 0.8 - 3.5 K/UL  
 ABS. MONOCYTES 0.4 0.0 - 1.0 K/UL  
 ABS. EOSINOPHILS 0.1 0.0 - 0.4 K/UL  
 ABS. BASOPHILS 0.0 0.0 - 0.1 K/UL  
 ABS. IMM. GRANS. 0.0 0.00 - 0.04 K/UL  
 DF AUTOMATED RENAL FUNCTION PANEL Collection Time: 03/28/21 10:50 AM  
Result Value Ref Range Sodium 157 (H) 136 - 145 mmol/L Potassium 4.2 3.5 - 5.1 mmol/L Chloride 125 (H) 97 - 108 mmol/L  
 CO2 23 21 - 32 mmol/L Anion gap 9 5 - 15 mmol/L Glucose 165 (H) 65 - 100 mg/dL  (H) 6 - 20 mg/dL Creatinine 4.19 (H) 0.70 - 1.30 mg/dL BUN/Creatinine ratio 30 (H) 12 - 20 GFR est AA 17 (L) >60 ml/min/1.73m2 GFR est non-AA 14 (L) >60 ml/min/1.73m2 Calcium 8.7 8.5 - 10.1 mg/dL Phosphorus 4.6 2.6 - 4.7 mg/dL Albumin 2.5 (L) 3.5 - 5.0 g/dL PTT Collection Time: 03/28/21 10:50 AM  
Result Value Ref Range aPTT 94.6 (H) 23.0 - 35.7 sec  
 aPTT, therapeutic range   68 - 109 sec PTT Collection Time: 03/28/21  7:30 PM  
Result Value Ref Range aPTT 100.3 (H) 23.0 - 35.7 sec  
 aPTT, therapeutic range   68 - 109 sec CBC WITH AUTOMATED DIFF Collection Time: 03/28/21  7:30 PM  
Result Value Ref Range WBC 6.3 4.1 - 11.1 K/uL  
 RBC 3.81 (L) 4.10 - 5.70 M/uL  
 HGB 10.5 (L) 12.1 - 17.0 g/dL HCT 35.7 (L) 36.6 - 50.3 %  MCV 93.7 80.0 - 99.0 FL  
 MCH 27.6 26.0 - 34.0 PG  
 MCHC 29.4 (L) 30.0 - 36.5 g/dL RDW 21.1 (H) 11.5 - 14.5 % PLATELET 968 (L) 794 - 400 K/uL MPV 11.6 8.9 - 12.9 FL  
 NRBC 0.0 0  WBC ABSOLUTE NRBC 0.00 0.00 - 0.01 K/uL NEUTROPHILS 86 (H) 32 - 75 % LYMPHOCYTES 8 (L) 12 - 49 % MONOCYTES 4 (L) 5 - 13 % EOSINOPHILS 2 0 - 7 % BASOPHILS 0 0 - 1 % IMMATURE GRANULOCYTES 0 0.0 - 0.5 % ABS. NEUTROPHILS 5.5 1.8 - 8.0 K/UL  
 ABS. LYMPHOCYTES 0.5 (L) 0.8 - 3.5 K/UL  
 ABS. MONOCYTES 0.2 0.0 - 1.0 K/UL  
 ABS. EOSINOPHILS 0.1 0.0 - 0.4 K/UL  
 ABS. BASOPHILS 0.0 0.0 - 0.1 K/UL  
 ABS. IMM. GRANS. 0.0 0.00 - 0.04 K/UL  
 DF AUTOMATED METABOLIC PANEL, COMPREHENSIVE Collection Time: 03/29/21  6:30 AM  
Result Value Ref Range Sodium 154 (H) 136 - 145 mmol/L Potassium 4.1 3.5 - 5.1 mmol/L Chloride 122 (H) 97 - 108 mmol/L  
 CO2 26 21 - 32 mmol/L Anion gap 6 5 - 15 mmol/L Glucose 112 (H) 65 - 100 mg/dL  (H) 6 - 20 mg/dL Creatinine 3.19 (H) 0.70 - 1.30 mg/dL BUN/Creatinine ratio 31 (H) 12 - 20 GFR est AA 23 (L) >60 ml/min/1.73m2 GFR est non-AA 19 (L) >60 ml/min/1.73m2 Calcium 9.0 8.5 - 10.1 mg/dL Bilirubin, total 0.4 0.2 - 1.0 mg/dL AST (SGOT) 81 (H) 15 - 37 U/L  
 ALT (SGPT) 57 12 - 78 U/L Alk. phosphatase 119 (H) 45 - 117 U/L Protein, total 6.3 (L) 6.4 - 8.2 g/dL Albumin 2.4 (L) 3.5 - 5.0 g/dL Globulin 3.9 2.0 - 4.0 g/dL A-G Ratio 0.6 (L) 1.1 - 2.2 PTT Collection Time: 03/29/21  6:30 AM  
Result Value Ref Range aPTT 142.1 (HH) 23.0 - 35.7 sec  
 aPTT, therapeutic range   68 - 109 sec GLUCOSE, POC Collection Time: 03/29/21  8:14 AM  
Result Value Ref Range Glucose (POC) 123 (H) 65 - 100 mg/dL Performed by Michael Gilbert Results Procedure Component Value Units Date/Time CULTURE, URINE [[id number]] Collected: 03/27/21 1700 Order Status: Completed Specimen: Urine Updated: 03/28/21 1620  COVID-19 RAPID TEST [[id number]] Collected: 03/27/21 0730 Order Status: Completed Specimen: Nasopharyngeal Updated: 03/27/21 4587 Specimen source Nasopharyngeal     
  COVID-19 rapid test Not Detected Comment: Rapid Abbott ID Now   Rapid NAAT:  The specimen is NEGATIVE for SARS-CoV-2, the novel coronavirus associated with COVID-19. Negative results should be treated as presumptive and, if inconsistent with clinical signs and symptoms or necessary for patient management, should be tested with an alternative molecular assay. Negative results do not preclude SARS-CoV-2 infection and should not be used as the sole basis for patient management decisions. This test has been authorized by the FDA under  
an Emergency Use Authorization (EUA) for use by authorized laboratories. Fact sheet for Healthcare Providers: iTendency.uy Fact sheet for Patients: iTendency.uy   Methodology: Isothermal Nucleic Acid Amplification CULTURE, BLOOD, PAIRED [[id number]] Collected: 03/26/21 2045 Order Status: Completed Specimen: Blood Updated: 03/29/21 0628 Special Requests: No Special Requests Culture result: No growth 2 days Labs:  
 
Recent Labs  
  03/28/21 
1930 03/28/21 
1050 WBC 6.3 7.1 HGB 10.5* 10.5* HCT 35.7* 35.7*  
* 136* Recent Labs  
  03/29/21 0630 03/28/21 
1050 03/27/21 
1300 03/26/21 2045 03/26/21 2045 * 157*  155* 155*   < > 154* K 4.1 4.2  4.2 5.9*   < > 5.6*  
* 125*  124* 125*   < > 116* CO2 26 23  22 17*   < > 18* * 125*  127* 179*   < > 207* CREA 3.19* 4.19*  4.22* 6.44*   < > 8.18* * 165*  166* 76   < > 119* CA 9.0 8.7  8.9 8.2*   < > 9.6 MG  --   --   --   --  5.6* PHOS  --  4.6 7.8*  --   --   
 < > = values in this interval not displayed. Recent Labs  
  03/29/21 0630 03/28/21 
1050 03/27/21 
1300 03/26/21 2045 ALT 57 58  --  90* * 105  --  131* TBILI 0.4 0.6  --  0.6 TP 6.3* 6.5  --  8.6* ALB 2.4* 2.5*  2.4* 2.3* 3.3*  
GLOB 3.9 4.1*  --  5.3* Recent Labs  
  03/29/21 
0630 03/28/21 
1930 03/28/21 
1050 APTT 142.1* 100.3* 94.6* No results for input(s): FE, TIBC, PSAT, FERR in the last 72 hours. No results found for: FOL, RBCF No results for input(s): PH, PCO2, PO2 in the last 72 hours. Recent Labs  
  03/27/21 
0616 03/26/21 2045 CPK  --  763* TROIQ 0.33* 0.57* No results found for: CHOL, CHOLX, CHLST, CHOLV, HDL, HDLP, LDL, LDLC, DLDLP, TGLX, TRIGL, TRIGP, CHHD, CHHDX Lab Results Component Value Date/Time Glucose (POC) 123 (H) 03/29/2021 08:14 AM  
 Glucose (POC) 84 03/27/2021 10:52 AM  
 
Lab Results Component Value Date/Time Color Yellow 03/27/2021 01:55 AM  
 Appearance Turbid (A) 03/27/2021 01:55 AM  
 Specific gravity 1.019 03/27/2021 01:55 AM  
 pH (UA) 5.0 03/27/2021 01:55 AM  
 Protein 100 (A) 03/27/2021 01:55 AM  
 Glucose Negative 03/27/2021 01:55 AM  
 Ketone Negative 03/27/2021 01:55 AM  
 Bilirubin Negative 03/27/2021 01:55 AM  
 Urobilinogen 0.1 03/27/2021 01:55 AM  
 Nitrites Negative 03/27/2021 01:55 AM  
 Leukocyte Esterase Large (A) 03/27/2021 01:55 AM  
 Bacteria Negative 03/27/2021 01:55 AM  
 Bacteria Negative 03/27/2021 01:55 AM  
 WBC 20-50 03/27/2021 01:55 AM  
 WBC 20-50 03/27/2021 01:55 AM  
 RBC 20-50 03/27/2021 01:55 AM  
 RBC 20-50 03/27/2021 01:55 AM  
 
   
Assessment:  
 
  
A. anju with rapid ventricular rate Acute kidney injury Hyperkalemia Hyponatremia UTI History of recent Covid History of obstructive uropathy Dementia Functional quadriplegic History of CVA History of DVT Hyperlipidemia UTI Plan:  
 
Continue aspirin Lipitor and Rocephin Start on amiodarone drip and heparin drip Nephrology and cardiology consult Repeat the lab Current Facility-Administered Medications:  
  amiodarone (CORDARONE) tablet 400 mg, 400 mg, Oral, BID, Polo Moser MD, 400 mg at 03/29/21 0815 
  lactated Ringers infusion 1,000 mL, 1,000 mL, IntraVENous, CONTINUOUS, Veronica Moon MD, Stopped at 03/27/21 0536 
  lactated Ringers infusion, 125 mL/hr, IntraVENous, CONTINUOUS, Carlene Moon MD, Last Rate: 125 mL/hr at 03/27/21 0346, 125 mL/hr at 03/27/21 0346   lactated Ringers infusion 1,000 mL, 1,000 mL, IntraVENous, CONTINUOUS, Veronica Moon MD, Stopped at 03/27/21 4295   acetaminophen (TYLENOL) tablet 650 mg, 650 mg, Oral, Q6H PRN, 650 mg at 03/28/21 2221 **OR** acetaminophen (TYLENOL) suppository 650 mg, 650 mg, Rectal, Q6H PRN, Veronica Moon MD 
  polyethylene glycol (MIRALAX) packet 17 g, 17 g, Oral, DAILY PRN, Veronica Moon MD 
  ondansetron (ZOFRAN ODT) tablet 4 mg, 4 mg, Oral, Q8H PRN **OR** ondansetron (ZOFRAN) injection 4 mg, 4 mg, IntraVENous, Q6H PRN, Carlene Moon MD 
  heparin 25,000 units in D5W 250 ml infusion, 12-25 Units/kg/hr, IntraVENous, TITRATE, Kyra Barrera MD, Last Rate: 10.2 mL/hr at 03/28/21 2302, 18 Units/kg/hr at 03/28/21 2302   heparin (porcine) 1,000 unit/mL injection 3,400 Units, 60 Units/kg, IntraVENous, PRN **OR** heparin (porcine) 1,000 unit/mL injection 1,700 Units, 30 Units/kg, IntraVENous, PRN, Kyra Barrera MD, 1,700 Units at 03/28/21 0406   dextrose 5 % - 0.2% NaCl infusion, 150 mL/hr, IntraVENous, CONTINUOUS, Brendan Coreas MD, Last Rate: 150 mL/hr at 03/28/21 1502, 150 mL/hr at 03/28/21 1502   cefTRIAXone (ROCEPHIN) 1 g in sterile water (preservative free) 10 mL IV syringe, 1 g, IntraVENous, Q24H, Brendan Coreas MD, 1 g at 03/28/21 1505   aspirin tablet 325 mg, 325 mg, Oral, DAILY, Carlene Moon MD, 325 mg at 03/28/21 1124 
  atorvastatin (LIPITOR) tablet 40 mg, 40 mg, Oral, QHS, Carlene Moon MD, 40 mg at 03/28/21 2223 
  nitroglycerin (NITROBID) 2 % ointment 1 Inch, 1 Inch, Topical, Q4H PRN, Carlene Moon MD 
  lactated Ringers infusion 1,000 mL, 1,000 mL, IntraVENous, CONTINUOUS, Carlene Moon MD, 1,000 mL at 03/26/21 3043

## 2021-03-29 NOTE — PROGRESS NOTES
Problem: Risk for Spread of Infection  Goal: Prevent transmission of infectious organism to others  Description: Prevent the transmission of infectious organisms to other patients, staff members, and visitors. Outcome: Progressing Towards Goal     Problem: Patient Education:  Go to Education Activity  Goal: Patient/Family Education  Outcome: Progressing Towards Goal     Problem: Nutrition Deficit  Goal: *Optimize nutritional status  Outcome: Progressing Towards Goal     Problem: Falls - Risk of  Goal: *Absence of Falls  Description: Document Lear Shaker Fall Risk and appropriate interventions in the flowsheet. Outcome: Progressing Towards Goal  Note: Fall Risk Interventions:       Mentation Interventions: Bed/chair exit alarm         Elimination Interventions: Bed/chair exit alarm    History of Falls Interventions: Vital signs minimum Q4HRs X 24 hrs (comment for end date)         Problem: Patient Education: Go to Patient Education Activity  Goal: Patient/Family Education  Outcome: Progressing Towards Goal     Problem: Pressure Injury - Risk of  Goal: *Prevention of pressure injury  Description: Document Stephen Scale and appropriate interventions in the flowsheet. Outcome: Progressing Towards Goal  Note: Pressure Injury Interventions:  Sensory Interventions: Assess changes in LOC, Turn and reposition approx.  every two hours (pillows and wedges if needed), Minimize linen layers, Monitor skin under medical devices, Keep linens dry and wrinkle-free, Float heels    Moisture Interventions: Absorbent underpads    Activity Interventions: Pressure redistribution bed/mattress(bed type)    Mobility Interventions: HOB 30 degrees or less    Nutrition Interventions: Document food/fluid/supplement intake    Friction and Shear Interventions: Apply protective barrier, creams and emollients                Problem: Patient Education: Go to Patient Education Activity  Goal: Patient/Family Education  Outcome: Progressing Towards Goal Problem: Impaired Skin Integrity/Pressure Injury Treatment  Goal: *Improvement of Existing Pressure Injury  Outcome: Progressing Towards Goal  Goal: *Prevention of pressure injury  Description: Document Stephen Scale and appropriate interventions in the flowsheet. Outcome: Progressing Towards Goal  Note: Pressure Injury Interventions:  Sensory Interventions: Assess changes in LOC, Turn and reposition approx.  every two hours (pillows and wedges if needed), Minimize linen layers, Monitor skin under medical devices, Keep linens dry and wrinkle-free, Float heels    Moisture Interventions: Absorbent underpads    Activity Interventions: Pressure redistribution bed/mattress(bed type)    Mobility Interventions: HOB 30 degrees or less    Nutrition Interventions: Document food/fluid/supplement intake    Friction and Shear Interventions: Apply protective barrier, creams and emollients                Problem: Patient Education: Go to Patient Education Activity  Goal: Patient/Family Education  Outcome: Progressing Towards Goal

## 2021-03-29 NOTE — PROGRESS NOTES
PHYSICAL THERAPY EVALUATION/DISCHARGE  Patient: Felicita Navarrete (84 y.o. male)  Date: 3/29/2021  Primary Diagnosis: Acute renal failure (ARF) ([facility] [N17.9]  Hypovolemia [E86.1]  Atrial fibrillation, rapid ([facility] [I48.91]  JAE (acute kidney injury) ([facility] [N17.9]       Precautions: falls, skin integrity, NG tube feeds       ASSESSMENT  Pt is a 65 yo male admitted on 3/26/2021 for lethargy and weakness; currently has NG tube feds in place. PMH: anemia, BPH, CKD, CVA, dementia, CVA, GERD, glaucoma, HLD. Pt A&O x name and type of place only. Per medical records pt resides at [facility], is a LTC resident there. Bed bound at baseline with contractures in bilateral LE and in left UE. Pt total A for all ADLs and IADLs at baseline. Based on the objective data described below, the patient presents at functional baseline for mobility and amb. Pt sidelying in fetal position on left side upon PT/OT arrival, nsg present administering medications via NG tube, bilateral mitts in place. Pt required total A x2 for bed mobility and repositioning in bed to obtain at 30 deg of elevation in bed to more upright position for safety with tube feds. Pt screamed in pain with all mobility and when readjusting his legs and placing pillow between them. Pt did poor with session today, very poor activity tolerance, severe bilateral LE contractures, resistant to all mobility and PROM; attempted oral care to remove blood around teeth, pt refused and held mouth shut not allowing for oral care to be performed. Pt has no skilled acute PT needs at this time noted by PT or reported by pt, will DC skilled PT following evaluation; pt verbalized understanding and agreement.      Current Level of Function Impacting Discharge (mobility/balance): total A x2 for all mobility     Other factors to consider for discharge: at functional baseline     PLAN :  Recommendations and Planned Interventions: DC from skilled PT services following evaluation    Frequency/Duration: Patient will be followed by physical therapy: DC from services following evaluation due to pt being at functional baseline; no skilled therapy required during admission, please reorder if needed     Recommendation for discharge: (in order for the patient to meet his/her long term goals)  Return to LTC    This discharge recommendation:  Has been made in collaboration with the attending provider and/or case management    IF patient discharges home will need the following DME: none       SUBJECTIVE:   Patient stated i'm in the hospital.    OBJECTIVE DATA SUMMARY:   HISTORY:    Past Medical History:   Diagnosis Date    Anemia     BPH (benign prostatic hyperplasia)     CKD (chronic kidney disease)     [facility]re[facility]     Dementia ([facility]     DVT (deep venous thrombosis) ([facility]     GERD (gastroesophageal reflux disease)     Glaucoma     Hyperlipemia    No past surgical history on file.     Home Situation  Home Environment: Long term care([facility])  Support Systems: (nursing home staff)  Patient Expects to be Discharged to[de-identified] (long term care)  Current DME Used/Available at Home: (unknown)    EXAMINATION/PRESENTATION/DECISION MAKING:   Critical Behavior:  Neurologic State: Confused, Drowsy, Irritable  Orientation Level: Disoriented to place, Disoriented to situation, Disoriented to time, Oriented to person(oxfirst name)  Cognition: Decreased attention/concentration, No command following     Hearing:     Skin:  Dryness and redness noted along bilateral LE and buttock, blood noted around mouth (from nose bleed yesterday)  Edema: none noted  Range Of Motion:  AROM: Grossly decreased, non-functional           PROM: Grossly decreased, non-functional           Strength:    Strength: Grossly decreased, non-functional                    Tone & Sensation:   Tone: Abnormal                              Coordination:     Vision:      Functional Mobility:  Bed Mobility:  Rolling: Total assistance;Assist x2        Scooting: Total assistance;Assist x2  Transfers:    not completed this session    Therapeutic Exercises:   Not completed this session    Functional Measure:  [address] [facility] [address] [address] AM-PAC 6 Clicks         Basic Mobility Inpatient Short Form  How much difficulty does the patient currently have. .. Unable A Lot A Little None   1. Turning over in bed (including adjusting bedclothes, sheets and blankets)? [x] 1   [] 2   [] 3   [] 4   2. Sitting down on and standing up from a chair with arms ( e.g., wheelchair, bedside commode, etc.)   [x] 1   [] 2   [] 3   [] 4   3. Moving from lying on back to sitting on the side of the bed? [x] 1   [] 2   [] 3   [] 4          How much help from another person does the patient currently need. .. Total A Lot A Little None   4. Moving to and from a bed to a chair (including a wheelchair)? [x] 1   [] 2   [] 3   [] 4   5. Need to walk in hospital room? [x] 1   [] 2   [] 3   [] 4   6. Climbing 3-5 steps with a railing? [x] 1   [] 2   [] 3   [] 4   © 2007, Trustees of [address] [facility] [address] [address], under license to [employer]. All rights reserved     Score:  Initial: 6/24 Most Recent: X (Date: 3/29/21)   Interpretation of Tool:  Represents activities that are increasingly more difficult (i.e. Bed mobility, Transfers, Gait).   Score 24 23 22-20 19-15 14-10 9-7 6   Modifier CH CI CJ CK CL CM CN          Physical Therapy Evaluation Charge Determination   History Examination Presentation Decision-Making   HIGH Complexity :3+ comorbidities / personal factors will impact the outcome/ POC  HIGH Complexity : 4+ Standardized tests and measures addressing body structure, function, activity limitation and / or participation in recreation  MEDIUM Complexity : Evolving with changing characteristics  Other outcome measures [facility] 6  high      Based on the above components, the patient evaluation is determined to be of the following complexity level: MEDIUM    Pain Rating:  Not rated 8/10 with LE mobility    Activity Tolerance:   Poor    After treatment patient left in no apparent distress:   Supine in bed, Call bell within reach, Bed / chair alarm activated and Side rails x 3 and nsg updated      COMMUNICATION/EDUCATION:   The patients plan of care was discussed with: Occupational therapist, Registered nurse and Case management. Patient is unable to participate in goal setting and plan of care. PT/OT sessions occurred together for increased safety of pt and clinician.     Thank you for this referral.  Basim Garcias, PT, DPT   Time Calculation: 21 mins

## 2021-03-30 LAB
ALBUMIN SERPL-MCNC: 2.1 G/DL (ref 3.5–5)
ALBUMIN SERPL-MCNC: 2.1 G/DL (ref 3.5–5)
ALBUMIN/GLOB SERPL: 0.6 {RATIO} (ref 1.1–2.2)
ALP SERPL-CCNC: 123 U/L (ref 45–117)
ALT SERPL-CCNC: 54 U/L (ref 12–78)
ANION GAP SERPL CALC-SCNC: 5 MMOL/L (ref 5–15)
ANION GAP SERPL CALC-SCNC: 5 MMOL/L (ref 5–15)
APTT PPP: 105.5 SEC (ref 23–35.7)
AST SERPL W P-5'-P-CCNC: 85 U/L (ref 15–37)
BASOPHILS # BLD: 0 K/UL (ref 0–0.1)
BASOPHILS # BLD: 0 K/UL (ref 0–0.1)
BASOPHILS NFR BLD: 0 % (ref 0–1)
BASOPHILS NFR BLD: 0 % (ref 0–1)
BILIRUB SERPL-MCNC: 0.3 MG/DL (ref 0.2–1)
BUN SERPL-MCNC: 63 MG/DL (ref 6–20)
BUN SERPL-MCNC: 65 MG/DL (ref 6–20)
BUN/CREAT SERPL: 28 (ref 12–20)
BUN/CREAT SERPL: 29 (ref 12–20)
CA-I BLD-MCNC: 8 MG/DL (ref 8.5–10.1)
CA-I BLD-MCNC: 8.4 MG/DL (ref 8.5–10.1)
CHLORIDE SERPL-SCNC: 117 MMOL/L (ref 97–108)
CHLORIDE SERPL-SCNC: 117 MMOL/L (ref 97–108)
CO2 SERPL-SCNC: 25 MMOL/L (ref 21–32)
CO2 SERPL-SCNC: 25 MMOL/L (ref 21–32)
CREAT SERPL-MCNC: 2.24 MG/DL (ref 0.7–1.3)
CREAT SERPL-MCNC: 2.25 MG/DL (ref 0.7–1.3)
DIFFERENTIAL METHOD BLD: ABNORMAL
DIFFERENTIAL METHOD BLD: ABNORMAL
EOSINOPHIL # BLD: 0.1 K/UL (ref 0–0.4)
EOSINOPHIL # BLD: 0.2 K/UL (ref 0–0.4)
EOSINOPHIL NFR BLD: 3 % (ref 0–7)
EOSINOPHIL NFR BLD: 5 % (ref 0–7)
ERYTHROCYTE [DISTWIDTH] IN BLOOD BY AUTOMATED COUNT: 20.6 % (ref 11.5–14.5)
ERYTHROCYTE [DISTWIDTH] IN BLOOD BY AUTOMATED COUNT: 21 % (ref 11.5–14.5)
GLOBULIN SER CALC-MCNC: 3.5 G/DL (ref 2–4)
GLUCOSE BLD STRIP.AUTO-MCNC: 106 MG/DL (ref 65–100)
GLUCOSE BLD STRIP.AUTO-MCNC: 111 MG/DL (ref 65–100)
GLUCOSE BLD STRIP.AUTO-MCNC: 114 MG/DL (ref 65–100)
GLUCOSE BLD STRIP.AUTO-MCNC: 84 MG/DL (ref 65–100)
GLUCOSE SERPL-MCNC: 128 MG/DL (ref 65–100)
GLUCOSE SERPL-MCNC: 131 MG/DL (ref 65–100)
HCT VFR BLD AUTO: 28.6 % (ref 36.6–50.3)
HCT VFR BLD AUTO: 29.7 % (ref 36.6–50.3)
HGB BLD-MCNC: 8.4 G/DL (ref 12.1–17)
HGB BLD-MCNC: 8.6 G/DL (ref 12.1–17)
IMM GRANULOCYTES # BLD AUTO: 0 K/UL (ref 0–0.04)
IMM GRANULOCYTES # BLD AUTO: 0 K/UL (ref 0–0.04)
IMM GRANULOCYTES NFR BLD AUTO: 1 % (ref 0–0.5)
IMM GRANULOCYTES NFR BLD AUTO: 1 % (ref 0–0.5)
LYMPHOCYTES # BLD: 0.4 K/UL (ref 0.8–3.5)
LYMPHOCYTES # BLD: 0.4 K/UL (ref 0.8–3.5)
LYMPHOCYTES NFR BLD: 10 % (ref 12–49)
LYMPHOCYTES NFR BLD: 9 % (ref 12–49)
MAGNESIUM SERPL-MCNC: 2.4 MG/DL (ref 1.6–2.4)
MCH RBC QN AUTO: 27.3 PG (ref 26–34)
MCH RBC QN AUTO: 27.8 PG (ref 26–34)
MCHC RBC AUTO-ENTMCNC: 29 G/DL (ref 30–36.5)
MCHC RBC AUTO-ENTMCNC: 29.4 G/DL (ref 30–36.5)
MCV RBC AUTO: 94.3 FL (ref 80–99)
MCV RBC AUTO: 94.7 FL (ref 80–99)
MONOCYTES # BLD: 0.2 K/UL (ref 0–1)
MONOCYTES # BLD: 0.3 K/UL (ref 0–1)
MONOCYTES NFR BLD: 6 % (ref 5–13)
MONOCYTES NFR BLD: 6 % (ref 5–13)
NEUTS SEG # BLD: 3.4 K/UL (ref 1.8–8)
NEUTS SEG # BLD: 3.8 K/UL (ref 1.8–8)
NEUTS SEG NFR BLD: 79 % (ref 32–75)
NEUTS SEG NFR BLD: 80 % (ref 32–75)
PERFORMED BY, TECHID: ABNORMAL
PERFORMED BY, TECHID: NORMAL
PHOSPHATE SERPL-MCNC: 1.8 MG/DL (ref 2.6–4.7)
PLATELET # BLD AUTO: 113 K/UL (ref 150–400)
PLATELET # BLD AUTO: 119 K/UL (ref 150–400)
PMV BLD AUTO: 11.8 FL (ref 8.9–12.9)
PMV BLD AUTO: 12.3 FL (ref 8.9–12.9)
POTASSIUM SERPL-SCNC: 3.7 MMOL/L (ref 3.5–5.1)
POTASSIUM SERPL-SCNC: 3.7 MMOL/L (ref 3.5–5.1)
PROT SERPL-MCNC: 5.6 G/DL (ref 6.4–8.2)
RBC # BLD AUTO: 3.02 M/UL (ref 4.1–5.7)
RBC # BLD AUTO: 3.15 M/UL (ref 4.1–5.7)
SODIUM SERPL-SCNC: 147 MMOL/L (ref 136–145)
SODIUM SERPL-SCNC: 147 MMOL/L (ref 136–145)
THERAPEUTIC RANGE,PTTT: ABNORMAL SEC (ref 68–109)
WBC # BLD AUTO: 4.2 K/UL (ref 4.1–11.1)
WBC # BLD AUTO: 4.6 K/UL (ref 4.1–11.1)

## 2021-03-30 PROCEDURE — 85025 COMPLETE CBC W/AUTO DIFF WBC: CPT

## 2021-03-30 PROCEDURE — 74011250636 HC RX REV CODE- 250/636: Performed by: INTERNAL MEDICINE

## 2021-03-30 PROCEDURE — 74011250637 HC RX REV CODE- 250/637: Performed by: INTERNAL MEDICINE

## 2021-03-30 PROCEDURE — 82962 GLUCOSE BLOOD TEST: CPT

## 2021-03-30 PROCEDURE — 92610 EVALUATE SWALLOWING FUNCTION: CPT

## 2021-03-30 PROCEDURE — 65270000029 HC RM PRIVATE

## 2021-03-30 PROCEDURE — 74011250637 HC RX REV CODE- 250/637: Performed by: FAMILY MEDICINE

## 2021-03-30 PROCEDURE — 74011000250 HC RX REV CODE- 250: Performed by: INTERNAL MEDICINE

## 2021-03-30 PROCEDURE — 74011250636 HC RX REV CODE- 250/636: Performed by: FAMILY MEDICINE

## 2021-03-30 PROCEDURE — 80069 RENAL FUNCTION PANEL: CPT

## 2021-03-30 PROCEDURE — 80053 COMPREHEN METABOLIC PANEL: CPT

## 2021-03-30 PROCEDURE — 83735 ASSAY OF MAGNESIUM: CPT

## 2021-03-30 RX ADMIN — ATORVASTATIN CALCIUM 40 MG: 40 TABLET, FILM COATED ORAL at 22:50

## 2021-03-30 RX ADMIN — DEXTROSE AND SODIUM CHLORIDE 150 ML/HR: 5; 200 INJECTION, SOLUTION INTRAVENOUS at 05:53

## 2021-03-30 RX ADMIN — CEFTRIAXONE SODIUM 1 G: 1 INJECTION, POWDER, FOR SOLUTION INTRAMUSCULAR; INTRAVENOUS at 14:04

## 2021-03-30 RX ADMIN — SODIUM CHLORIDE: 234 INJECTION, SOLUTION, CONCENTRATE INTRAVENOUS at 12:09

## 2021-03-30 RX ADMIN — AMIODARONE HYDROCHLORIDE 400 MG: 200 TABLET ORAL at 17:31

## 2021-03-30 RX ADMIN — HEPARIN SODIUM 17 UNITS/KG/HR: 10000 INJECTION, SOLUTION INTRAVENOUS at 00:19

## 2021-03-30 NOTE — PROGRESS NOTES
Renal progress note    Patient: Efrain Olmstead MRN: [id number]  SSN: [id number]    YOB: 1945  Age: 66 y.o. Sex: male      Subjective:   Pt is seen at bedside,, AMS+, no distress  Improving renal functions, creatinine down to 3.19 today  Sodium improved to 154      Past Medical History:   Diagnosis Date    Anemia     BPH (benign prostatic hyperplasia)     CKD (chronic kidney disease)     [facility]     [facility]     DVT (deep venous thrombosis) ([facility])     GERD (gastroesophageal reflux disease)     Glaucoma     Hyperlipemia      No past surgical history on file. No family history on file.   Social History     Tobacco Use    Smoking status: Unknown If Ever Smoked   Substance Use Topics    Alcohol use: Not Currently      Current Facility-Administered Medications   Medication Dose Route Frequency Provider Last Rate Last Admin    amiodarone (CORDARONE) tablet 400 mg  400 mg Oral BID Viviana Bo MD   400 mg at 03/29/21 1740    lactated Ringers infusion 1,000 mL  1,000 mL IntraVENous CONTINUOUS Ashish Moon MD   Stopped at 03/27/21 0536    lactated Ringers infusion  125 mL/hr IntraVENous CONTINUOUS Carlene Moon  mL/hr at 03/27/21 0346 125 mL/hr at 03/27/21 0346    lactated Ringers infusion 1,000 mL  1,000 mL IntraVENous CONTINUOUS Ashish Moon MD   Stopped at 03/27/21 0535    acetaminophen (TYLENOL) tablet 650 mg  650 mg Oral Q6H PRN Ashish Moon MD   650 mg at 03/29/21 2148    Or    acetaminophen (TYLENOL) suppository 650 mg  650 mg Rectal Q6H PRN Ashish Moon MD        polyethylene glycol (MIRALAX) packet 17 g  17 g Oral DAILY PRN Ashish Moon MD        ondansetron (ZOFRAN ODT) tablet 4 mg  4 mg Oral Q8H PRN Ashish Moon MD        Or    ondansetron [facility]) injection 4 mg  4 mg IntraVENous Q6H PRN Carlene Moon MD        heparin 25,000 units in D5W 250 ml infusion  12-25 Units/kg/hr IntraVENous TITRATE Kiya Last MD 9.6 mL/hr at 03/29/21 1820 17 Units/kg/hr at 03/29/21 1820    heparin (porcine) 1,000 unit/mL injection 3,400 Units  60 Units/kg IntraVENous PRN Kiya Last MD   30 Units at 03/29/21 1814    Or    heparin (porcine) 1,000 unit/mL injection 1,700 Units  30 Units/kg IntraVENous PRN Kiya Last MD   1,700 Units at 03/28/21 0406    dextrose 5 % - 0.2% NaCl infusion  150 mL/hr IntraVENous CONTINUOUS Harmony Fowler  mL/hr at 03/29/21 1633 150 mL/hr at 03/29/21 1633    cefTRIAXone (ROCEPHIN) 1 g in sterile water (preservative free) 10 mL IV syringe  1 g IntraVENous Q24H Harmony Fowler MD   1 g at 03/29/21 1431    aspirin tablet 325 mg  325 mg Oral DAILY Carlene Moon MD   325 mg at 03/29/21 1019    atorvastatin (LIPITOR) tablet 40 mg  40 mg Oral QHS Carlene Moon MD   40 mg at 03/29/21 2148    lactated Ringers infusion 1,000 mL  1,000 mL IntraVENous CONTINUOUS Kaylin Moon MD   1,000 mL at 03/26/21 2317        Allergies   Allergen Reactions    Flonase [Fluticasone] Unknown (comments)       Review of Systems:  Unable to obtain    Objective:     Vitals:    03/29/21 1048 03/29/21 1457 03/29/21 1757 03/29/21 1917   BP: 119/67 (!) 146/71 (!) 146/71 131/72   Pulse: 89 89 89 83   Resp:   20 20   Temp: 98 °F (36.7 °C) 98.4 °F (36.9 °C) 98.4 °F (36.9 °C) 98.2 °F (36.8 °C)   SpO2: 92% 95%  95%   Weight:       Height:            Physical Exam:  General: AMS  Eyes: sclera anicteric  Oral Cavity: No thrush or ulcers  Neck: no JVD  Chest: Fair bilateral air entry  Heart: normal sounds  Abdomen: soft and non tender   :  Vzaquez+  Lower Extremities: no edema  Skin: no rash  Neuro: ams  Psychiatric: non-depressed            Assessment:     Hospital Problems  Never Reviewed          Codes Class Noted POA    Hypovolemia ICD-10-CM: E86.1  ICD-9-CM: 276.52  3/27/2021 Unknown        Acute renal failure (ARF) ([facility] ICD-10-CM: N17.9  ICD-9-CM: 584.9  3/27/2021 Unknown Atrial fibrillation, rapid [facility]) ICD-10-CM: I48.91  ICD-9-CM: 427.31  3/27/2021 Unknown        JAE (acute kidney injury) [facility]) ICD-10-CM: N17.9  ICD-9-CM: 584.9  3/27/2021 Unknown              Plan:     1 acute kidney injury:   -Etiology is likely prerenal azotemia from volume depletion.    -On admission BUN/creatinine 207/8.1, has improved down to 127/4. 22. Improving renal functions with IV hydration, creatinine down to 3.19 today  --CT abdomen no evidence of hydronephrosis. Good urine output present  Continue IV fluids and continue to monitor renal functions    2. Hypernatremia.    -From free water deficit.    -Sodium 158-->155--157--154. Continue with hypotonic IV fluids and monitor sodium levels    3. Hyperkalemia. Improved potassium levels with medical management  -Continue to monitor potassium levels    4. Hypermagnesemia. Repeat magnesium level tomorrow     5. Altered mental status.    -Could be metabolic encephalopathy/UTI. -CT head no acute process. -Urinalysis consistent with UTI.    -Continue to monitor clinically with improvement in renal functions and hypernatremia      Signed By: Ronal Henderson MD     March 29, 2021

## 2021-03-30 NOTE — PROGRESS NOTES
Pt accepted with [facility]. CM spoke with Dr. Dmitriy Lopez and provided him with pt's niece phone number. Cm informed him the niece would like to speak with him.

## 2021-03-30 NOTE — PROGRESS NOTES
Problem: Dysphagia (Adult)  Goal: *Acute Goals and Plan of Care (Insert Text)  Description: Speech Therapy Goals  Initiated 3/30/2021  -Patient will participate in modified barium swallow study within 7 day(s), if/as indicated pending pt's progress. [ ] Not met  [ ]  MET   [ ] Progressing  [ ] Yesi Vargas  -Patient will tolerate puree diet with nectar thick liquids without signs/symptoms of aspiration given minimal cues within 7 day(s). [ ] Not met  [ ]  MET   [ ] Progressing  [ ] Yesi Vargas  -Patient will demonstrate understanding of swallow safety precautions and aspiration precautions, diet recs with moderate cues within 7 day(s). [ ] Not met  [ ]  MET   [ ] Progressing  [ ] Discontinue  Outcome: Not Met   SPEECH [address] EVALUATIONS  Patient: Paddy Cabot (77 y.o. male)  Date: 3/30/2021  Primary Diagnosis: Acute renal failure (ARF) ([address] [N17.9]  Hypovolemia [E86.1]  Atrial fibrillation, rapid ([address] [I48.91]  JAE (acute kidney injury) ([address] [N17.9]        Precautions: aspiration       ASSESSMENT :  Based on the objective data described below, the patient presents with moderate oropharyngeal dysphagia. Pt with NG out at this time due to reported bleeding from oral/nasal cavity. Pt currently NPO pending SLP per nsg/MD.   Pt with limited verbalizations but does respond to clinician and accept limited PO trials. Pt is contracted and does not tolerate 90 degree upright position. Pt given trials thin via straw, nectar via straw and puree. Oral phase with global weakness, mild difficulty drinking from straw, poor dentition and sparse, able to clear oral cavity. Dried blood on teeth and tongue noted. Pharyngeal phase with persistent delay and weakness but appears functional for baseline puree/nectar diet. Patient will benefit from skilled intervention to address the above impairments.   Patients rehabilitation potential is considered to be Fair     PLAN :  Recommendations and Planned Interventions: At this time, recommend initiate pt's baseline diet of puree/nectar with 1:1 assistance with ALL PO intake, STRICT aspiration and GERD precautions, monitor pt closely for s/s aspiration, meds crushed if able in puree, FEED ONLY IF AWAKE AND ALERT. Concerns for inadequate nutritional intake are present, recommend RD follow up. Pt not appropriate for MBS at this time due to positional difficulties, but would be beneficial if able to participate. Aspiration concerns are present  Frequency/Duration: Patient will be followed by speech-language pathology 5 times a week to address goals. Discharge Recommendations: Skilled Nursing Facility     SUBJECTIVE:   Patient alert, agreeable, limited verbalizations. OBJECTIVE:     Past Medical History:   Diagnosis Date    Anemia     BPH (benign prostatic hyperplasia)     CKD (chronic kidney disease)     [facility]     Dementia ([facility]     DVT (deep venous thrombosis) ([facility])     GERD (gastroesophageal reflux disease)     Glaucoma     Hyperlipemia    No past surgical history on file.   Prior Level of Function/Home Situation: SNF/LTC  Home Situation  Home Environment: Long term care([facility])  Support Systems: (nursing home staff)  Patient Expects to be Discharged to[de-identified] (long term care)  Current DME Used/Available at Home: (unknown)  Diet prior to admission: puree/nectar per hard chart  Current Diet:  NPO   Cognitive and Communication Status:  Neurologic State: Drowsy, Eyes open to stimulus, Eyes open to voice  Orientation Level: Unable to verbalize  Cognition: Decreased attention/concentration      Pain:  Pain Scale 1: Numeric (0 - 10)  Pain Intensity 1: 0       After treatment:   Patient left in no apparent distress sitting up in chair, Patient left in no apparent distress in bed, and Call bell within reach    COMMUNICATION/EDUCATION:   Patient was educated regarding purpose of SLP assessment, POC, diet recs and sw safety precautions. Patient demonstrated Guarded understanding as evidenced by AMS, withdrawn. The patient's plan of care including recommendations, planned interventions, and recommended diet changes were discussed with: Registered nurse and Physician. Patient is unable to participate in goal setting and plan of care.     Thank you for this referral.  Jannet Gomez M.S. CCC-SLP  Time Calculation: 9 mins

## 2021-03-30 NOTE — PROGRESS NOTES
Discharge plan of care/case management plan validated with provider discharge order. Patient discharged with instructions for care at home with home health. Called a cab for patient because his daughter is flying in from [place] and will not be home until this evening.

## 2021-03-30 NOTE — PROGRESS NOTES
0 spoke with lab regarding timed lab for PTT. 0037 pt bleeding stopped drip heparin drip. Called lab to see if they could come stat to draw pt labs including a CBC due to his bleeding. 6466 called Dr. Leyda Munoz to inform him of change in pt condition and that the heperin dip has been stopped do to pt bleeding from his nose and mouth with sediment in his urine. Md wants to be called if Hgb is less than 7.0.    0149 CMP resulted    0241 CBC resulted pt Hgb is 8.6, PTT resulted 105.5     0358 reassessment, pt continues to bleed from his mouth and nares    0500 called MD to update on pt condition. MD ordered to pull the NG tube and repeat a CBC in the morning.    0600 CHG bath given, pt bleeding has been reduced greatly after removal of the NG tube and stopping the heparin drip.   Pt had a very large formed BM, echeverria cath care performed    0710 bedside shift report given to [facility] using [employer]

## 2021-03-30 NOTE — PROGRESS NOTES
Renal progress note    Patient: Faviola Garcia MRN: [id number]  SSN: [id number]    YOB: 1945  Age: 66 y.o. Sex: male      Subjective:   Pt is seen at bedside,, AMS+, no distress  Improving renal functions, creatinine down to 2.24 today  Sodium improved to 147      Past Medical History:   Diagnosis Date    Anemia     BPH (benign prostatic hyperplasia)     CKD (chronic kidney disease)     [facility]     Dementia [facility]     [facility]     GERD (gastroesophageal reflux disease)     Glaucoma     Hyperlipemia      No past surgical history on file. No family history on file.   Social History     Tobacco Use    Smoking status: Unknown If Ever Smoked   Substance Use Topics    Alcohol use: Not Currently      Current Facility-Administered Medications   Medication Dose Route Frequency Provider Last Rate Last Admin    dextrose 5% 1,000 mL with sodium chloride 4 MEQ/ML (23.4%) 34.2 mEq infusion   IntraVENous CONTINUOUS Carlene Moon  mL/hr at 03/30/21 1209 New Bag at 03/30/21 1209    amiodarone (CORDARONE) tablet 400 mg  400 mg Oral BID Nirmala Jacobs MD   Stopped at 03/30/21 0600    lactated Ringers infusion 1,000 mL  1,000 mL IntraVENous CONTINUOUS Zack Moon MD   Stopped at 03/27/21 0536    lactated Ringers infusion  125 mL/hr IntraVENous CONTINUOUS Carlene Moon  mL/hr at 03/27/21 0346 125 mL/hr at 03/27/21 0346    lactated Ringers infusion 1,000 mL  1,000 mL IntraVENous CONTINUOUS Zack Moon MD   Stopped at 03/27/21 0535    acetaminophen (TYLENOL) tablet 650 mg  650 mg Oral Q6H PRN Carlene Moon MD   650 mg at 03/29/21 2148    Or    acetaminophen (TYLENOL) suppository 650 mg  650 mg Rectal Q6H PRN Zack Moon MD        polyethylene glycol (MIRALAX) packet 17 g  17 g Oral DAILY PRN Zack Moon MD        ondansetron (ZOFRAN ODT) tablet 4 mg  4 mg Oral Q8H PRN Jocelin Turner MD Or    ondansetron (ZOFRAN) injection 4 mg  4 mg IntraVENous Q6H PRN Darrius Moon MD        heparin 25,000 units in D5W 250 ml infusion  12-25 Units/kg/hr IntraVENous TITRATE Sander Saldaña MD   Stopped at 03/30/21 0037    heparin (porcine) 1,000 unit/mL injection 3,400 Units  60 Units/kg IntraVENous PRN Sander Saldaña MD   30 Units at 03/29/21 1814    Or    heparin (porcine) 1,000 unit/mL injection 1,700 Units  30 Units/kg IntraVENous PRN Sander Saldaña MD   1,700 Units at 03/28/21 0406    cefTRIAXone (ROCEPHIN) 1 g in sterile water (preservative free) 10 mL IV syringe  1 g IntraVENous Q24H Lala Correia MD   1 g at 03/30/21 1404    aspirin tablet 325 mg  325 mg Oral DAILY Carlene Moon MD   325 mg at 03/29/21 1019    atorvastatin (LIPITOR) tablet 40 mg  40 mg Oral QHS Carlene Moon MD   40 mg at 03/29/21 2148    lactated Ringers infusion 1,000 mL  1,000 mL IntraVENous CONTINUOUS Darrius Moon MD   1,000 mL at 03/26/21 2317        Allergies   Allergen Reactions    Flonase [Fluticasone] Unknown (comments)         Objective:     Vitals:    03/30/21 0748 03/30/21 1117 03/30/21 1140 03/30/21 1415   BP: (!) 92/58 (!) 91/52 (!) 107/57 121/61   Pulse: 92 72  100   Resp: 18 18  19   Temp: 97.5 °F (36.4 °C) 99 °F (37.2 °C) 99.6 °F (37.6 °C) 99.9 °F (37.7 °C)   SpO2: 94% 94%  93%   Weight:       Height:            Physical Exam:  General: AMS  Eyes: sclera anicteric  Oral Cavity: No thrush or ulcers  Neck: no JVD  Chest: Fair bilateral air entry  Heart: normal sounds  Abdomen: soft and non tender   :  Vazquez+  Lower Extremities: no edema  Skin: no rash  Neuro: ams            Assessment:     Hospital Problems  Never Reviewed          Codes Class Noted POA    Hypovolemia ICD-10-CM: E86.1  ICD-9-CM: 276.52  3/27/2021 Unknown        Acute renal failure (ARF) ([facility] ICD-10-CM: N17.9  ICD-9-CM: 584.9  3/27/2021 Unknown        Atrial fibrillation, rapid ([facility] ICD-10-CM: I48.91  ICD-9-CM: 427.31  3/27/2021 Unknown        JAE (acute kidney injury) [facility]) ICD-10-CM: N17.9  ICD-9-CM: 584.9  3/27/2021 Unknown              Plan:     1 acute kidney injury:   -Etiology is likely prerenal azotemia from volume depletion.    -On admission BUN/creatinine 207/8.1, has improved down to 127/4. 22. Improving renal functions with IV hydration, creatinine down to 3.19--2.24 today  --CT abdomen no evidence of hydronephrosis. Good urine output present  Continue IV fluids and continue to monitor renal functions    2. Hypernatremia.    -From free water deficit.    -Sodium 158-->155--157--154--147. Continue with hypotonic IV fluids and monitor sodium levels    3. Hyperkalemia. Improved potassium levels with medical management  -Continue to monitor potassium levels    4. Hypermagnesemia. Repeat magnesium level tomorrow     5. Altered mental status.    -Could be metabolic encephalopathy/UTI. -CT head no acute process. -Urinalysis consistent with UTI.    -Continue to monitor clinically with improvement in renal functions and hypernatremia      Signed By: French Enriquez MD     March 30, 2021

## 2021-03-30 NOTE — PROGRESS NOTES
General Daily Progress Note          Patient Name:   Gallo Warren       YOB: 1945       Age:  66 y.o. Admit Date: 3/26/2021      Subjective:     75yom with a history of dementia, stroke, COVID, DVT, CKD presented with lethargy and weakness and was diagnosed with JAE and hyperkalemia. Today, patient seen resting in bed and nonconversant (baseline) with NG tube removed and heparin stopped. 1 am patient started with bleeding from nose and mouth, and heparin was stopped and NG tube removed. Bleeding stopped at 5 am.  Hgb 8.6, Hct  29.7, repeat labs pending. Yesterday niece was contacted and agreed to [facility] long term care as discharge plan. Speech consulted yesterday for swallow evaluation, but patient refused. Was on puree/nectar prior to admission. Not ready for PO intake. Will try again. BP 92/58  P 92  T 97.5 F  R 18  SpO2 94%    PTT 78.8      Objective:     Visit Vitals  BP (!) 92/58 (BP 1 Location: Right upper arm, BP Patient Position: At rest;Supine)   Pulse 92   Temp 97.5 °F (36.4 °C)   Resp 18   Ht 6' 2.02\" (1.88 m)   Wt 152 lb 5.4 oz (69.1 kg)   SpO2 94%   BMI 19.55 kg/m²        Recent Results (from the past 24 hour(s))   PTT    Collection Time: 03/29/21  4:57 PM   Result Value Ref Range    aPTT 78.8 (H) 23.0 - 35.7 sec    aPTT, therapeutic range   68 - 109 sec   CBC WITH AUTOMATED DIFF    Collection Time: 03/30/21 12:30 AM   Result Value Ref Range    WBC 4.2 4.1 - 11.1 K/uL    RBC 3.15 (L) 4.10 - 5.70 M/uL    HGB 8.6 (L) 12.1 - 17.0 g/dL    HCT 29.7 (L) 36.6 - 50.3 %    MCV 94.3 80.0 - 99.0 FL    MCH 27.3 26.0 - 34.0 PG    MCHC 29.0 (L) 30.0 - 36.5 g/dL    RDW 21.0 (H) 11.5 - 14.5 %    PLATELET 906 (L) 784 - 400 K/uL    MPV 12.3 8.9 - 12.9 FL    NEUTROPHILS 79 (H) 32 - 75 %    LYMPHOCYTES 9 (L) 12 - 49 %    MONOCYTES 6 5 - 13 %    EOSINOPHILS 5 0 - 7 %    BASOPHILS 0 0 - 1 %    IMMATURE GRANULOCYTES 1 (H) 0.0 - 0.5 %    ABS. NEUTROPHILS 3.4 1.8 - 8.0 K/UL    ABS. LYMPHOCYTES 0.4 (L) 0.8 - 3.5 K/UL    ABS. MONOCYTES 0.2 0.0 - 1.0 K/UL    ABS. EOSINOPHILS 0.2 0.0 - 0.4 K/UL    ABS. BASOPHILS 0.0 0.0 - 0.1 K/UL    ABS. IMM. GRANS. 0.0 0.00 - 0.04 K/UL    DF AUTOMATED     METABOLIC PANEL, COMPREHENSIVE    Collection Time: 03/30/21 12:30 AM   Result Value Ref Range    Sodium 147 (H) 136 - 145 mmol/L    Potassium 3.7 3.5 - 5.1 mmol/L    Chloride 117 (H) 97 - 108 mmol/L    CO2 25 21 - 32 mmol/L    Anion gap 5 5 - 15 mmol/L    Glucose 131 (H) 65 - 100 mg/dL    BUN 65 (H) 6 - 20 mg/dL    Creatinine 2.25 (H) 0.70 - 1.30 mg/dL    BUN/Creatinine ratio 29 (H) 12 - 20      GFR est AA 34 (L) >60 ml/min/1.73m2    GFR est non-AA 28 (L) >60 ml/min/1.73m2    Calcium 8.4 (L) 8.5 - 10.1 mg/dL    Bilirubin, total 0.3 0.2 - 1.0 mg/dL    AST (SGOT) 85 (H) 15 - 37 U/L    ALT (SGPT) 54 12 - 78 U/L    Alk.  phosphatase 123 (H) 45 - 117 U/L    Protein, total 5.6 (L) 6.4 - 8.2 g/dL    Albumin 2.1 (L) 3.5 - 5.0 g/dL    Globulin 3.5 2.0 - 4.0 g/dL    A-G Ratio 0.6 (L) 1.1 - 2.2     RENAL FUNCTION PANEL    Collection Time: 03/30/21 12:30 AM   Result Value Ref Range    Sodium 147 (H) 136 - 145 mmol/L    Potassium 3.7 3.5 - 5.1 mmol/L    Chloride 117 (H) 97 - 108 mmol/L    CO2 25 21 - 32 mmol/L    Anion gap 5 5 - 15 mmol/L    Glucose 128 (H) 65 - 100 mg/dL    BUN 63 (H) 6 - 20 mg/dL    Creatinine 2.24 (H) 0.70 - 1.30 mg/dL    BUN/Creatinine ratio 28 (H) 12 - 20      GFR est AA 35 (L) >60 ml/min/1.73m2    GFR est non-AA 29 (L) >60 ml/min/1.73m2    Calcium 8.0 (L) 8.5 - 10.1 mg/dL    Phosphorus 1.8 (L) 2.6 - 4.7 mg/dL    Albumin 2.1 (L) 3.5 - 5.0 g/dL   MAGNESIUM    Collection Time: 03/30/21 12:30 AM   Result Value Ref Range    Magnesium 2.4 1.6 - 2.4 mg/dL   GLUCOSE, POC    Collection Time: 03/30/21  7:45 AM   Result Value Ref Range    Glucose (POC) 114 (H) 65 - 100 mg/dL    Performed by Aden Tanner            Review of Systems    Unable to obtain      Physical Exam:      Constitutional: Awake, nonverbal mumbles  Head: Normocephalic and atraumatic. Dried blood in mouth and around nares. Eyes: Pupils are equal, round, and reactive to light. EOM are normal.   Cardiovascular: Normal rate, regular rhythm and normal heart sounds. Pulmonary/Chest: Breath sounds normal. No wheezes. No rales. Exhibits no tenderness. Abdominal: Soft. Bowel sounds are normal. There is no abdominal tenderness. There is no rebound and no guarding. Musculoskeletal: Normal range of motion. Neurological: Contracted leg, unintelligible speech, functional quadriplegic  Skin: wounds on right hip, left medial knee, gluteal cleft     XR CHEST PORT   Final Result   NG tube position as above. Worsening lung aeration. CT ABD PELV WO CONT   Final Result   No acute abdominopelvic abnormality. Nonobstructing bilateral renal   calculi. Correlate for constipation given increased stool burden. End-stage   degenerative changes of the hips with postoperative change in the right   acetabulum. Pulmonary interstitial abnormality partially visualized in the acute   setting would suggest infection/inflammation or edema versus a combination of   these and in any case with early airspace component at the right lung base. CT HEAD WO CONT   Final Result   No acute intracranial abnormality. 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 Mild   generalized atrophy with nonspecific white matter abnormality that may indicate   chronic small vessel disease. 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 XR CHEST PORT   Final Result   Rotated exam. Diffuse interstitial abnormality and with question of   some superimposed nodular areas.  In the acute setting edema, infection or   combination of these is considered however, especially given the possible   nodular opacities, follow-up to radiographic resolution and/or further   evaluation with chest CT if symptoms/findings do not resolve as expected with   treatment, or if indicated otherwise. Recent Results (from the past 24 hour(s))   PTT    Collection Time: 03/29/21  4:57 PM   Result Value Ref Range    aPTT 78.8 (H) 23.0 - 35.7 sec    aPTT, therapeutic range   68 - 109 sec   CBC WITH AUTOMATED DIFF    Collection Time: 03/30/21 12:30 AM   Result Value Ref Range    WBC 4.2 4.1 - 11.1 K/uL    RBC 3.15 (L) 4.10 - 5.70 M/uL    HGB 8.6 (L) 12.1 - 17.0 g/dL    HCT 29.7 (L) 36.6 - 50.3 %    MCV 94.3 80.0 - 99.0 FL    MCH 27.3 26.0 - 34.0 PG    MCHC 29.0 (L) 30.0 - 36.5 g/dL    RDW 21.0 (H) 11.5 - 14.5 %    PLATELET 577 (L) 833 - 400 K/uL    MPV 12.3 8.9 - 12.9 FL    NEUTROPHILS 79 (H) 32 - 75 %    LYMPHOCYTES 9 (L) 12 - 49 %    MONOCYTES 6 5 - 13 %    EOSINOPHILS 5 0 - 7 %    BASOPHILS 0 0 - 1 %    IMMATURE GRANULOCYTES 1 (H) 0.0 - 0.5 %    ABS. NEUTROPHILS 3.4 1.8 - 8.0 K/UL    ABS. LYMPHOCYTES 0.4 (L) 0.8 - 3.5 K/UL    ABS. MONOCYTES 0.2 0.0 - 1.0 K/UL    ABS. EOSINOPHILS 0.2 0.0 - 0.4 K/UL    ABS. BASOPHILS 0.0 0.0 - 0.1 K/UL    ABS. IMM. GRANS. 0.0 0.00 - 0.04 K/UL    DF AUTOMATED     METABOLIC PANEL, COMPREHENSIVE    Collection Time: 03/30/21 12:30 AM   Result Value Ref Range    Sodium 147 (H) 136 - 145 mmol/L    Potassium 3.7 3.5 - 5.1 mmol/L    Chloride 117 (H) 97 - 108 mmol/L    CO2 25 21 - 32 mmol/L    Anion gap 5 5 - 15 mmol/L    Glucose 131 (H) 65 - 100 mg/dL    BUN 65 (H) 6 - 20 mg/dL    Creatinine 2.25 (H) 0.70 - 1.30 mg/dL    BUN/Creatinine ratio 29 (H) 12 - 20      GFR est AA 34 (L) >60 ml/min/1.73m2    GFR est non-AA 28 (L) >60 ml/min/1.73m2    Calcium 8.4 (L) 8.5 - 10.1 mg/dL    Bilirubin, total 0.3 0.2 - 1.0 mg/dL    AST (SGOT) 85 (H) 15 - 37 U/L    ALT (SGPT) 54 12 - 78 U/L    Alk.  phosphatase 123 (H) 45 - 117 U/L    Protein, total 5.6 (L) 6.4 - 8.2 g/dL    Albumin 2.1 (L) 3.5 - 5.0 g/dL    Globulin 3.5 2.0 - 4.0 g/dL    A-G Ratio 0.6 (L) 1.1 - 2.2     RENAL FUNCTION PANEL    Collection Time: 03/30/21 12:30 AM   Result Value Ref Range    Sodium 147 (H) 136 - 145 mmol/L    Potassium 3.7 3.5 - 5.1 mmol/L    Chloride 117 (H) 97 - 108 mmol/L    CO2 25 21 - 32 mmol/L    Anion gap 5 5 - 15 mmol/L    Glucose 128 (H) 65 - 100 mg/dL    BUN 63 (H) 6 - 20 mg/dL    Creatinine 2.24 (H) 0.70 - 1.30 mg/dL    BUN/Creatinine ratio 28 (H) 12 - 20      GFR est AA 35 (L) >60 ml/min/1.73m2    GFR est non-AA 29 (L) >60 ml/min/1.73m2    Calcium 8.0 (L) 8.5 - 10.1 mg/dL    Phosphorus 1.8 (L) 2.6 - 4.7 mg/dL    Albumin 2.1 (L) 3.5 - 5.0 g/dL   MAGNESIUM    Collection Time: 03/30/21 12:30 AM   Result Value Ref Range    Magnesium 2.4 1.6 - 2.4 mg/dL   GLUCOSE, POC    Collection Time: 03/30/21  7:45 AM   Result Value Ref Range    Glucose (POC) 114 (H) 65 - 100 mg/dL    Performed by [facility]        Results     Procedure Component Value Units Date/Time    CULTURE, URINE [[id number]] Collected: 03/27/21 1700    Order Status: Completed Specimen: Urine Updated: 03/29/21 1114     Special Requests: No Special Requests        Culture result: No Growth (<1000 cfu/mL)       COVID-19 RAPID TEST [[id number]] Collected: 03/27/21 0730    Order Status: Completed Specimen: Nasopharyngeal Updated: 03/27/21 0813     Specimen source Nasopharyngeal        COVID-19 rapid test Not Detected        Comment: Rapid Abbott ID Now   Rapid NAAT:  The specimen is NEGATIVE for SARS-CoV-2, the novel coronavirus associated with COVID-19. Negative results should be treated as presumptive and, if inconsistent with clinical signs and symptoms or necessary for patient management, should be tested with an alternative molecular assay. Negative results do not preclude SARS-CoV-2 infection and should not be used as the sole basis for patient management decisions. This test has been authorized by the FDA under   an Emergency Use Authorization (EUA) for use by authorized laboratories.  Fact sheet for Healthcare Providers: ConventionUpdate.co.nz Fact sheet for Patients: [facility]   Methodology: Isothermal Nucleic Acid Amplification         CULTURE, BLOOD, PAIRED [[id number]] Collected: 03/26/21 2045    Order Status: Completed Specimen: Blood Updated: 03/30/21 0750     Special Requests: No Special Requests        Culture result: No growth 3 days              Labs:     Recent Labs     03/30/21  0030 03/28/21  1930   WBC 4.2 6.3   HGB 8.6* 10.5*   HCT 29.7* 35.7*   * 103*     Recent Labs     03/30/21  0030 03/29/21  0630 03/28/21  1050 03/27/21  1300   *  147* 154* 157*  155* 155*   K 3.7  3.7 4.1 4.2  4.2 5.9*   *  117* 122* 125*  124* 125*   CO2 25  25 26 23  22 17*   BUN 63*  65* 100* 125*  127* 179*   CREA 2.24*  2.25* 3.19* 4.19*  4.22* 6.44*   *  131* 112* 165*  166* 76   CA 8.0*  8.4* 9.0 8.7  8.9 8.2*   MG 2.4  --   --   --    PHOS 1.8*  --  4.6 7.8*     Recent Labs     03/30/21  0030 03/29/21  0630 03/28/21  1050   ALT 54 57 58   * 119* 105   TBILI 0.3 0.4 0.6   TP 5.6* 6.3* 6.5   ALB 2.1*  2.1* 2.4* 2.5*  2.4*   GLOB 3.5 3.9 4.1*     Recent Labs     03/29/21  1657 03/29/21  0630 03/29/21  0030   APTT 78.8* 142.1* 105.5*      No results for input(s): FE, TIBC, PSAT, FERR in the last 72 hours. No results found for: FOL, RBCF   No results for input(s): PH, PCO2, PO2 in the last 72 hours. No results for input(s): CPK, CKNDX, TROIQ in the last 72 hours.     No lab exists for component: CPKMB  No results found for: CHOL, CHOLX, CHLST, CHOLV, HDL, HDLP, LDL, LDLC, DLDLP, TGLX, TRIGL, TRIGP, CHHD, CHHDX  Lab Results   Component Value Date/Time    Glucose (POC) 114 (H) 03/30/2021 07:45 AM    Glucose (POC) 123 (H) 03/29/2021 08:14 AM    Glucose (POC) 84 03/27/2021 10:52 AM     Lab Results   Component Value Date/Time    Color Yellow 03/27/2021 01:55 AM    Appearance Turbid (A) 03/27/2021 01:55 AM    Specific gravity 1.019 03/27/2021 01:55 AM    pH (UA) 5.0 03/27/2021 01:55 AM    Protein 100 (A) 03/27/2021 01:55 AM    Glucose Negative 03/27/2021 01:55 AM    Ketone Negative 03/27/2021 01:55 AM    Bilirubin Negative 03/27/2021 01:55 AM    Urobilinogen 0.1 03/27/2021 01:55 AM    Nitrites Negative 03/27/2021 01:55 AM    Leukocyte Esterase Large (A) 03/27/2021 01:55 AM    Bacteria Negative 03/27/2021 01:55 AM    Bacteria Negative 03/27/2021 01:55 AM    WBC 20-50 03/27/2021 01:55 AM    WBC 20-50 03/27/2021 01:55 AM    RBC 20-50 03/27/2021 01:55 AM    RBC 20-50 03/27/2021 01:55 AM         Assessment:        A. fib with rapid ventricular rate  Improving with HR in 80s, up to 110 with moving    Acute kidney injury  Cr 2.24 (8.1 on admission)  BUN 63(207 on admission)    Acute Blood Loss Anemia secondary to bleeding from nose, mouth  Hgb 8.6, Hct  29.7  Heparin drip stopped  NG tube removed    Hyperkalemia  resolved    Hypernatremia  147    UTI    History of recent Covid    History of obstructive uropathy    Dementia    Functional quadriplegic    History of CVA    History of DVT    Hyperlipidemia    UTI      Plan:     Continue Aspirin, 325 mg, PO QD   Continue Lipitor , 40 mg, PO QD  Continue Ceftriaxone, 1g, IV QD  Stopped amiodarone drip    STOP heparin drip  Remove NG tube    Continue with Nephrology and cardiology consult    Repeat labs to check for JAE, electrolyte imbalances  Repeat Hgb, Hct pending          Current Facility-Administered Medications:     amiodarone (CORDARONE) tablet 400 mg, 400 mg, Oral, BID, Rosalba Gan MD, Stopped at 03/30/21 0600    lactated Ringers infusion 1,000 mL, 1,000 mL, IntraVENous, CONTINUOUS, Kinga Moon MD, Stopped at 03/27/21 0536    lactated Ringers infusion, 125 mL/hr, IntraVENous, CONTINUOUS, Carlene Moon MD, Last Rate: 125 mL/hr at 03/27/21 0346, 125 mL/hr at 03/27/21 0346    lactated Ringers infusion 1,000 mL, 1,000 mL, IntraVENous, CONTINUOUS, Kinga Moon MD, Stopped at 03/27/21 0535    acetaminophen (TYLENOL) tablet 650 mg, 650 mg, Oral, Q6H PRN, 650 mg at 03/29/21 2148 **OR** acetaminophen (TYLENOL) suppository 650 mg, 650 mg, Rectal, Q6H PRN, Zack Moon MD    polyethylene glycol (MIRALAX) packet 17 g, 17 g, Oral, DAILY PRN, Zack Moon MD    ondansetron (ZOFRAN ODT) tablet 4 mg, 4 mg, Oral, Q8H PRN **OR** ondansetron (ZOFRAN) injection 4 mg, 4 mg, IntraVENous, Q6H PRN, Carlene Moon MD    heparin 25,000 units in D5W 250 ml infusion, 12-25 Units/kg/hr, IntraVENous, TITRATE, Nirmala Jacobs MD, Stopped at 03/30/21 0037    heparin (porcine) 1,000 unit/mL injection 3,400 Units, 60 Units/kg, IntraVENous, PRN, 30 Units at 03/29/21 1814 **OR** heparin (porcine) 1,000 unit/mL injection 1,700 Units, 30 Units/kg, IntraVENous, PRN, Nirmala Jacobs MD, 1,700 Units at 03/28/21 0406    dextrose 5 % - 0.2% NaCl infusion, 150 mL/hr, IntraVENous, CONTINUOUS, Brendan Coreas MD, Last Rate: 150 mL/hr at 03/30/21 0553, 150 mL/hr at 03/30/21 0553    cefTRIAXone (ROCEPHIN) 1 g in sterile water (preservative free) 10 mL IV syringe, 1 g, IntraVENous, Q24H, Brendan Coreas MD, 1 g at 03/29/21 1431    aspirin tablet 325 mg, 325 mg, Oral, DAILY, Carlene Moon MD, 325 mg at 03/29/21 1019    atorvastatin (LIPITOR) tablet 40 mg, 40 mg, Oral, QHS, Carlene Moon MD, 40 mg at 03/29/21 2148    lactated Ringers infusion 1,000 mL, 1,000 mL, IntraVENous, CONTINUOUS, Carlene Moon MD, 1,000 mL at 03/26/21 2317

## 2021-03-30 NOTE — PROGRESS NOTES
General Daily Progress Note          Patient Name:   Philomena Crisostomo       YOB: 1945       Age:  66 y.o. Admit Date: 3/26/2021      Subjective:     75yom with a history of dementia, stroke, COVID, DVT, CKD presented with lethargy and weakness and was diagnosed with JAE and hyperkalemia. Today, patient seen resting in bed and nonconversant (baseline) with NG tube removed and heparin stopped. 1 am patient started with bleeding from nose and mouth, and heparin was stopped and NG tube removed. Bleeding stopped at 5 am.  Hgb 8.6, Hct  29.7, repeat labs pending. Yesterday niece was contacted and agreed to [facility] long term care as discharge plan. Speech consulted yesterday for swallow evaluation, but patient refused. Was on puree/nectar prior to admission. Not ready for PO intake. Will try again. BP 92/58  P 92  T 97.5 F  R 18  SpO2 94%    PTT 78.8      Objective:     Visit Vitals  BP (!) 107/57   Pulse 72   Temp 99.6 °F (37.6 °C)   Resp 18   Ht 6' 2.02\" (1.88 m)   Wt 69.1 kg (152 lb 5.4 oz)   SpO2 94%   BMI 19.55 kg/m²        Recent Results (from the past 24 hour(s))   PTT    Collection Time: 03/29/21  4:57 PM   Result Value Ref Range    aPTT 78.8 (H) 23.0 - 35.7 sec    aPTT, therapeutic range   68 - 109 sec   CBC WITH AUTOMATED DIFF    Collection Time: 03/30/21 12:30 AM   Result Value Ref Range    WBC 4.2 4.1 - 11.1 K/uL    RBC 3.15 (L) 4.10 - 5.70 M/uL    HGB 8.6 (L) 12.1 - 17.0 g/dL    HCT 29.7 (L) 36.6 - 50.3 %    MCV 94.3 80.0 - 99.0 FL    MCH 27.3 26.0 - 34.0 PG    MCHC 29.0 (L) 30.0 - 36.5 g/dL    RDW 21.0 (H) 11.5 - 14.5 %    PLATELET 848 (L) 282 - 400 K/uL    MPV 12.3 8.9 - 12.9 FL    NEUTROPHILS 79 (H) 32 - 75 %    LYMPHOCYTES 9 (L) 12 - 49 %    MONOCYTES 6 5 - 13 %    EOSINOPHILS 5 0 - 7 %    BASOPHILS 0 0 - 1 %    IMMATURE GRANULOCYTES 1 (H) 0.0 - 0.5 %    ABS. NEUTROPHILS 3.4 1.8 - 8.0 K/UL    ABS. LYMPHOCYTES 0.4 (L) 0.8 - 3.5 K/UL    ABS.  MONOCYTES 0.2 0.0 - 1.0 K/UL ABS. EOSINOPHILS 0.2 0.0 - 0.4 K/UL    ABS. BASOPHILS 0.0 0.0 - 0.1 K/UL    ABS. IMM. GRANS. 0.0 0.00 - 0.04 K/UL    DF AUTOMATED     METABOLIC PANEL, COMPREHENSIVE    Collection Time: 03/30/21 12:30 AM   Result Value Ref Range    Sodium 147 (H) 136 - 145 mmol/L    Potassium 3.7 3.5 - 5.1 mmol/L    Chloride 117 (H) 97 - 108 mmol/L    CO2 25 21 - 32 mmol/L    Anion gap 5 5 - 15 mmol/L    Glucose 131 (H) 65 - 100 mg/dL    BUN 65 (H) 6 - 20 mg/dL    Creatinine 2.25 (H) 0.70 - 1.30 mg/dL    BUN/Creatinine ratio 29 (H) 12 - 20      GFR est AA 34 (L) >60 ml/min/1.73m2    GFR est non-AA 28 (L) >60 ml/min/1.73m2    Calcium 8.4 (L) 8.5 - 10.1 mg/dL    Bilirubin, total 0.3 0.2 - 1.0 mg/dL    AST (SGOT) 85 (H) 15 - 37 U/L    ALT (SGPT) 54 12 - 78 U/L    Alk.  phosphatase 123 (H) 45 - 117 U/L    Protein, total 5.6 (L) 6.4 - 8.2 g/dL    Albumin 2.1 (L) 3.5 - 5.0 g/dL    Globulin 3.5 2.0 - 4.0 g/dL    A-G Ratio 0.6 (L) 1.1 - 2.2     RENAL FUNCTION PANEL    Collection Time: 03/30/21 12:30 AM   Result Value Ref Range    Sodium 147 (H) 136 - 145 mmol/L    Potassium 3.7 3.5 - 5.1 mmol/L    Chloride 117 (H) 97 - 108 mmol/L    CO2 25 21 - 32 mmol/L    Anion gap 5 5 - 15 mmol/L    Glucose 128 (H) 65 - 100 mg/dL    BUN 63 (H) 6 - 20 mg/dL    Creatinine 2.24 (H) 0.70 - 1.30 mg/dL    BUN/Creatinine ratio 28 (H) 12 - 20      GFR est AA 35 (L) >60 ml/min/1.73m2    GFR est non-AA 29 (L) >60 ml/min/1.73m2    Calcium 8.0 (L) 8.5 - 10.1 mg/dL    Phosphorus 1.8 (L) 2.6 - 4.7 mg/dL    Albumin 2.1 (L) 3.5 - 5.0 g/dL   MAGNESIUM    Collection Time: 03/30/21 12:30 AM   Result Value Ref Range    Magnesium 2.4 1.6 - 2.4 mg/dL   GLUCOSE, POC    Collection Time: 03/30/21  7:45 AM   Result Value Ref Range    Glucose (POC) 114 (H) 65 - 100 mg/dL    Performed by Leroy Jacques    CBC WITH AUTOMATED DIFF    Collection Time: 03/30/21 10:31 AM   Result Value Ref Range    WBC 4.6 4.1 - 11.1 K/uL    RBC 3.02 (L) 4.10 - 5.70 M/uL    HGB 8.4 (L) 12.1 - 17.0 g/dL HCT 28.6 (L) 36.6 - 50.3 %    MCV 94.7 80.0 - 99.0 FL    MCH 27.8 26.0 - 34.0 PG    MCHC 29.4 (L) 30.0 - 36.5 g/dL    RDW 20.6 (H) 11.5 - 14.5 %    PLATELET 051 (L) 260 - 400 K/uL    MPV 11.8 8.9 - 12.9 FL    NEUTROPHILS 80 (H) 32 - 75 %    LYMPHOCYTES 10 (L) 12 - 49 %    MONOCYTES 6 5 - 13 %    EOSINOPHILS 3 0 - 7 %    BASOPHILS 0 0 - 1 %    IMMATURE GRANULOCYTES 1 (H) 0.0 - 0.5 %    ABS. NEUTROPHILS 3.8 1.8 - 8.0 K/UL    ABS. LYMPHOCYTES 0.4 (L) 0.8 - 3.5 K/UL    ABS. MONOCYTES 0.3 0.0 - 1.0 K/UL    ABS. EOSINOPHILS 0.1 0.0 - 0.4 K/UL    ABS. BASOPHILS 0.0 0.0 - 0.1 K/UL    ABS. IMM. GRANS. 0.0 0.00 - 0.04 K/UL    DF AUTOMATED     GLUCOSE, POC    Collection Time: 03/30/21 11:20 AM   Result Value Ref Range    Glucose (POC) 106 (H) 65 - 100 mg/dL    Performed by Jannet Flores            Review of Systems    Unable to obtain      Physical Exam:      Constitutional: Awake, nonverbal mumbles  Head: Normocephalic and atraumatic. Dried blood in mouth and around nares. Eyes: Pupils are equal, round, and reactive to light. EOM are normal.   Cardiovascular: Normal rate, regular rhythm and normal heart sounds. Pulmonary/Chest: Breath sounds normal. No wheezes. No rales. Exhibits no tenderness. Abdominal: Soft. Bowel sounds are normal. There is no abdominal tenderness. There is no rebound and no guarding. Musculoskeletal: Normal range of motion. Neurological: Contracted leg, unintelligible speech, functional quadriplegic  Skin: wounds on right hip, left medial knee, gluteal cleft     XR CHEST PORT   Final Result   NG tube position as above. Worsening lung aeration. CT ABD PELV WO CONT   Final Result   No acute abdominopelvic abnormality. Nonobstructing bilateral renal   calculi. Correlate for constipation given increased stool burden. End-stage   degenerative changes of the hips with postoperative change in the right   acetabulum.  Pulmonary interstitial abnormality partially visualized in the acute setting would suggest infection/inflammation or edema versus a combination of   these and in any case with early airspace component at the right lung base. CT HEAD WO CONT   Final Result   No acute intracranial abnormality. 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 Mild   generalized atrophy with nonspecific white matter abnormality that may indicate   chronic small vessel disease. 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 XR CHEST PORT   Final Result   Rotated exam. Diffuse interstitial abnormality and with question of   some superimposed nodular areas. In the acute setting edema, infection or   combination of these is considered however, especially given the possible   nodular opacities, follow-up to radiographic resolution and/or further   evaluation with chest CT if symptoms/findings do not resolve as expected with   treatment, or if indicated otherwise. Recent Results (from the past 24 hour(s))   PTT    Collection Time: 03/29/21  4:57 PM   Result Value Ref Range    aPTT 78.8 (H) 23.0 - 35.7 sec    aPTT, therapeutic range   68 - 109 sec   CBC WITH AUTOMATED DIFF    Collection Time: 03/30/21 12:30 AM   Result Value Ref Range    WBC 4.2 4.1 - 11.1 K/uL    RBC 3.15 (L) 4.10 - 5.70 M/uL    HGB 8.6 (L) 12.1 - 17.0 g/dL    HCT 29.7 (L) 36.6 - 50.3 %    MCV 94.3 80.0 - 99.0 FL    MCH 27.3 26.0 - 34.0 PG    MCHC 29.0 (L) 30.0 - 36.5 g/dL    RDW 21.0 (H) 11.5 - 14.5 %    PLATELET 173 (L) 051 - 400 K/uL    MPV 12.3 8.9 - 12.9 FL    NEUTROPHILS 79 (H) 32 - 75 %    LYMPHOCYTES 9 (L) 12 - 49 %    MONOCYTES 6 5 - 13 %    EOSINOPHILS 5 0 - 7 %    BASOPHILS 0 0 - 1 %    IMMATURE GRANULOCYTES 1 (H) 0.0 - 0.5 %    ABS. NEUTROPHILS 3.4 1.8 - 8.0 K/UL    ABS. LYMPHOCYTES 0.4 (L) 0.8 - 3.5 K/UL    ABS. MONOCYTES 0.2 0.0 - 1.0 K/UL    ABS. EOSINOPHILS 0.2 0.0 - 0.4 K/UL    ABS. BASOPHILS 0.0 0.0 - 0.1 K/UL    ABS. IMM.  GRANS. 0.0 0.00 - 0.04 K/UL    DF AUTOMATED     METABOLIC PANEL, COMPREHENSIVE    Collection Time: 03/30/21 12:30 AM   Result Value Ref Range    Sodium 147 (H) 136 - 145 mmol/L    Potassium 3.7 3.5 - 5.1 mmol/L    Chloride 117 (H) 97 - 108 mmol/L    CO2 25 21 - 32 mmol/L    Anion gap 5 5 - 15 mmol/L    Glucose 131 (H) 65 - 100 mg/dL    BUN 65 (H) 6 - 20 mg/dL    Creatinine 2.25 (H) 0.70 - 1.30 mg/dL    BUN/Creatinine ratio 29 (H) 12 - 20      GFR est AA 34 (L) >60 ml/min/1.73m2    GFR est non-AA 28 (L) >60 ml/min/1.73m2    Calcium 8.4 (L) 8.5 - 10.1 mg/dL    Bilirubin, total 0.3 0.2 - 1.0 mg/dL    AST (SGOT) 85 (H) 15 - 37 U/L    ALT (SGPT) 54 12 - 78 U/L    Alk.  phosphatase 123 (H) 45 - 117 U/L    Protein, total 5.6 (L) 6.4 - 8.2 g/dL    Albumin 2.1 (L) 3.5 - 5.0 g/dL    Globulin 3.5 2.0 - 4.0 g/dL    A-G Ratio 0.6 (L) 1.1 - 2.2     RENAL FUNCTION PANEL    Collection Time: 03/30/21 12:30 AM   Result Value Ref Range    Sodium 147 (H) 136 - 145 mmol/L    Potassium 3.7 3.5 - 5.1 mmol/L    Chloride 117 (H) 97 - 108 mmol/L    CO2 25 21 - 32 mmol/L    Anion gap 5 5 - 15 mmol/L    Glucose 128 (H) 65 - 100 mg/dL    BUN 63 (H) 6 - 20 mg/dL    Creatinine 2.24 (H) 0.70 - 1.30 mg/dL    BUN/Creatinine ratio 28 (H) 12 - 20      GFR est AA 35 (L) >60 ml/min/1.73m2    GFR est non-AA 29 (L) >60 ml/min/1.73m2    Calcium 8.0 (L) 8.5 - 10.1 mg/dL    Phosphorus 1.8 (L) 2.6 - 4.7 mg/dL    Albumin 2.1 (L) 3.5 - 5.0 g/dL   MAGNESIUM    Collection Time: 03/30/21 12:30 AM   Result Value Ref Range    Magnesium 2.4 1.6 - 2.4 mg/dL   GLUCOSE, POC    Collection Time: 03/30/21  7:45 AM   Result Value Ref Range    Glucose (POC) 114 (H) 65 - 100 mg/dL    Performed by Star Bud    CBC WITH AUTOMATED DIFF    Collection Time: 03/30/21 10:31 AM   Result Value Ref Range    WBC 4.6 4.1 - 11.1 K/uL    RBC 3.02 (L) 4.10 - 5.70 M/uL    HGB 8.4 (L) 12.1 - 17.0 g/dL    HCT 28.6 (L) 36.6 - 50.3 %    MCV 94.7 80.0 - 99.0 FL    MCH 27.8 26.0 - 34.0 PG    MCHC 29.4 (L) 30.0 - 36.5 g/dL    RDW 20.6 (H) 11.5 - 14.5 %    PLATELET 813 (L) 937 - 400 K/uL    MPV 11.8 8.9 - 12.9 FL    NEUTROPHILS 80 (H) 32 - 75 %    LYMPHOCYTES 10 (L) 12 - 49 %    MONOCYTES 6 5 - 13 %    EOSINOPHILS 3 0 - 7 %    BASOPHILS 0 0 - 1 %    IMMATURE GRANULOCYTES 1 (H) 0.0 - 0.5 %    ABS. NEUTROPHILS 3.8 1.8 - 8.0 K/UL    ABS. LYMPHOCYTES 0.4 (L) 0.8 - 3.5 K/UL    ABS. MONOCYTES 0.3 0.0 - 1.0 K/UL    ABS. EOSINOPHILS 0.1 0.0 - 0.4 K/UL    ABS. BASOPHILS 0.0 0.0 - 0.1 K/UL    ABS. IMM. GRANS. 0.0 0.00 - 0.04 K/UL    DF AUTOMATED     GLUCOSE, POC    Collection Time: 03/30/21 11:20 AM   Result Value Ref Range    Glucose (POC) 106 (H) 65 - 100 mg/dL    Performed by Delfina Sharp        Results     Procedure Component Value Units Date/Time    CULTURE, URINE [[id number]] Collected: 03/27/21 1700    Order Status: Completed Specimen: Urine Updated: 03/29/21 1114     Special Requests: No Special Requests        Culture result: No Growth (<1000 cfu/mL)       COVID-19 RAPID TEST [[id number]] Collected: 03/27/21 0730    Order Status: Completed Specimen: Nasopharyngeal Updated: 03/27/21 0813     Specimen source Nasopharyngeal        COVID-19 rapid test Not Detected        Comment: Rapid Abbott ID Now   Rapid NAAT:  The specimen is NEGATIVE for SARS-CoV-2, the novel coronavirus associated with COVID-19. Negative results should be treated as presumptive and, if inconsistent with clinical signs and symptoms or necessary for patient management, should be tested with an alternative molecular assay. Negative results do not preclude SARS-CoV-2 infection and should not be used as the sole basis for patient management decisions. This test has been authorized by the FDA under   an Emergency Use Authorization (EUA) for use by authorized laboratories.  Fact sheet for Healthcare Providers: ConventionUpdate.co.nz Fact sheet for Patients: ConventionUpdate.co.nz   Methodology: Isothermal Nucleic Acid Amplification         CULTURE, BLOOD, PAIRED [[id number]] Collected: 03/26/21 2045    Order Status: Completed Specimen: Blood Updated: 03/30/21 0750     Special Requests: No Special Requests        Culture result: No growth 3 days              Labs:     Recent Labs     03/30/21  1031 03/30/21  0030   WBC 4.6 4.2   HGB 8.4* 8.6*   HCT 28.6* 29.7*   * 119*     Recent Labs     03/30/21  0030 03/29/21  0630 03/28/21  1050 03/27/21  1300   *  147* 154* 157*  155* 155*   K 3.7  3.7 4.1 4.2  4.2 5.9*   *  117* 122* 125*  124* 125*   CO2 25  25 26 23  22 17*   BUN 63*  65* 100* 125*  127* 179*   CREA 2.24*  2.25* 3.19* 4.19*  4.22* 6.44*   *  131* 112* 165*  166* 76   CA 8.0*  8.4* 9.0 8.7  8.9 8.2*   MG 2.4  --   --   --    PHOS 1.8*  --  4.6 7.8*     Recent Labs     03/30/21  0030 03/29/21  0630 03/28/21  1050   ALT 54 57 58   * 119* 105   TBILI 0.3 0.4 0.6   TP 5.6* 6.3* 6.5   ALB 2.1*  2.1* 2.4* 2.5*  2.4*   GLOB 3.5 3.9 4.1*     Recent Labs     03/29/21  1657 03/29/21  0630 03/29/21  0030   APTT 78.8* 142.1* 105.5*      No results for input(s): FE, TIBC, PSAT, FERR in the last 72 hours. No results found for: FOL, RBCF   No results for input(s): PH, PCO2, PO2 in the last 72 hours. No results for input(s): CPK, CKNDX, TROIQ in the last 72 hours.     No lab exists for component: CPKMB  No results found for: CHOL, CHOLX, CHLST, CHOLV, HDL, HDLP, LDL, LDLC, DLDLP, TGLX, TRIGL, TRIGP, CHHD, CHHDX  Lab Results   Component Value Date/Time    Glucose (POC) 106 (H) 03/30/2021 11:20 AM    Glucose (POC) 114 (H) 03/30/2021 07:45 AM    Glucose (POC) 123 (H) 03/29/2021 08:14 AM    Glucose (POC) 84 03/27/2021 10:52 AM     Lab Results   Component Value Date/Time    Color Yellow 03/27/2021 01:55 AM    Appearance Turbid (A) 03/27/2021 01:55 AM    Specific gravity 1.019 03/27/2021 01:55 AM    pH (UA) 5.0 03/27/2021 01:55 AM    Protein 100 (A) 03/27/2021 01:55 AM    Glucose Negative 03/27/2021 01:55 AM    Ketone Negative 03/27/2021 01:55 AM    Bilirubin Negative 03/27/2021 01:55 AM    Urobilinogen 0.1 03/27/2021 01:55 AM    Nitrites Negative 03/27/2021 01:55 AM    Leukocyte Esterase Large (A) 03/27/2021 01:55 AM    Bacteria Negative 03/27/2021 01:55 AM    Bacteria Negative 03/27/2021 01:55 AM    WBC 20-50 03/27/2021 01:55 AM    WBC 20-50 03/27/2021 01:55 AM    RBC 20-50 03/27/2021 01:55 AM    RBC 20-50 03/27/2021 01:55 AM         Assessment:        A. fib with rapid ventricular rate  Improving with HR in 80s, up to 110 with moving  Heparin were discontinued due to bleeding    Acute kidney injury  Cr 2.24 (8.1 on admission)  BUN 63(207 on admission)    Acute Blood Loss Anemia secondary to bleeding from nose, mouth  Hgb 8.6, Hct  29.7  Heparin drip stopped  NG tube removed  Monitor H&H    Hyperkalemia  resolved    Hypernatremia  147    UTI/negative culture    History of recent Covid    History of obstructive uropathy    Dementia    Functional quadriplegic    History of CVA    History of DVT    Hyperlipidemia          Plan:     Continue Aspirin, 325 mg, PO QD   Continue Lipitor , 40 mg, PO QD  Continue Ceftriaxone, 1g, IV QD  Stopped amiodarone drip  Started on p.o. amiodarone    STOP heparin drip  Remove NG tube    Continue with Nephrology and cardiology consult    Repeat labs to check for JAE, electrolyte imbalances  Repeat Hgb, Hct pending  We will do speech consult  D/w pt family at phone           Current Facility-Administered Medications:     dextrose 5% 1,000 mL with sodium chloride 4 MEQ/ML (23.4%) 34.2 mEq infusion, , IntraVENous, CONTINUOUS, Carlene Moon MD    amiodarone (CORDARONE) tablet 400 mg, 400 mg, Oral, BID, Brittany Zacarias MD, Stopped at 03/30/21 0600    lactated Ringers infusion 1,000 mL, 1,000 mL, IntraVENous, CONTINUOUS, Siddharth Moon MD, Stopped at 03/27/21 0536    lactated Ringers infusion, 125 mL/hr, IntraVENous, CONTINUOUS, Siddharth Moon MD, Last Rate: 125 mL/hr at 03/27/21 0346, 125 mL/hr at 03/27/21 0346    lactated Ringers infusion 1,000 mL, 1,000 mL, IntraVENous, CONTINUOUS, Unique Moon MD, Stopped at 03/27/21 0535    acetaminophen (TYLENOL) tablet 650 mg, 650 mg, Oral, Q6H PRN, 650 mg at 03/29/21 2148 **OR** acetaminophen (TYLENOL) suppository 650 mg, 650 mg, Rectal, Q6H PRN, Unique Moon MD    polyethylene glycol (MIRALAX) packet 17 g, 17 g, Oral, DAILY PRN, Unique Moon MD    ondansetron (ZOFRAN ODT) tablet 4 mg, 4 mg, Oral, Q8H PRN **OR** ondansetron (ZOFRAN) injection 4 mg, 4 mg, IntraVENous, Q6H PRN, Carlene Moon MD    heparin 25,000 units in D5W 250 ml infusion, 12-25 Units/kg/hr, IntraVENous, TITRATE, Rah Pedroza MD, Stopped at 03/30/21 0037    heparin (porcine) 1,000 unit/mL injection 3,400 Units, 60 Units/kg, IntraVENous, PRN, 30 Units at 03/29/21 1814 **OR** heparin (porcine) 1,000 unit/mL injection 1,700 Units, 30 Units/kg, IntraVENous, PRN, Rah Pedroza MD, 1,700 Units at 03/28/21 0406    cefTRIAXone (ROCEPHIN) 1 g in sterile water (preservative free) 10 mL IV syringe, 1 g, IntraVENous, Q24H, Brendan Coreas MD, 1 g at 03/29/21 1431    aspirin tablet 325 mg, 325 mg, Oral, DAILY, Carlene Moon MD, 325 mg at 03/29/21 1019    atorvastatin (LIPITOR) tablet 40 mg, 40 mg, Oral, QHS, Carlene Moon MD, 40 mg at 03/29/21 2148    lactated Ringers infusion 1,000 mL, 1,000 mL, IntraVENous, CONTINUOUS, Carlene Moon MD, 1,000 mL at 03/26/21 9881

## 2021-03-30 NOTE — PROGRESS NOTES
1736: Patient given pudding to take medication and tolerated fine. However, patient refused to eat anything else after that.

## 2021-03-31 ENCOUNTER — APPOINTMENT (OUTPATIENT)
Dept: NON INVASIVE DIAGNOSTICS | Age: 76
DRG: 682 | End: 2021-03-31
Attending: INTERNAL MEDICINE
Payer: MEDICARE

## 2021-03-31 LAB
ALBUMIN SERPL-MCNC: 2 G/DL (ref 3.5–5)
ALBUMIN SERPL-MCNC: 2 G/DL (ref 3.5–5)
ALBUMIN/GLOB SERPL: 0.5 {RATIO} (ref 1.1–2.2)
ALP SERPL-CCNC: 122 U/L (ref 45–117)
ALT SERPL-CCNC: 58 U/L (ref 12–78)
ANION GAP SERPL CALC-SCNC: 5 MMOL/L (ref 5–15)
ANION GAP SERPL CALC-SCNC: 7 MMOL/L (ref 5–15)
AST SERPL W P-5'-P-CCNC: 77 U/L (ref 15–37)
BASOPHILS # BLD: 0 K/UL (ref 0–0.1)
BASOPHILS NFR BLD: 0 % (ref 0–1)
BILIRUB SERPL-MCNC: 0.6 MG/DL (ref 0.2–1)
BUN SERPL-MCNC: 28 MG/DL (ref 6–20)
BUN SERPL-MCNC: 28 MG/DL (ref 6–20)
BUN/CREAT SERPL: 23 (ref 12–20)
BUN/CREAT SERPL: 23 (ref 12–20)
CA-I BLD-MCNC: 8.2 MG/DL (ref 8.5–10.1)
CA-I BLD-MCNC: 8.4 MG/DL (ref 8.5–10.1)
CHLORIDE SERPL-SCNC: 112 MMOL/L (ref 97–108)
CHLORIDE SERPL-SCNC: 112 MMOL/L (ref 97–108)
CO2 SERPL-SCNC: 23 MMOL/L (ref 21–32)
CO2 SERPL-SCNC: 23 MMOL/L (ref 21–32)
CREAT SERPL-MCNC: 1.22 MG/DL (ref 0.7–1.3)
CREAT SERPL-MCNC: 1.23 MG/DL (ref 0.7–1.3)
DIFFERENTIAL METHOD BLD: ABNORMAL
EOSINOPHIL # BLD: 0.1 K/UL (ref 0–0.4)
EOSINOPHIL NFR BLD: 2 % (ref 0–7)
ERYTHROCYTE [DISTWIDTH] IN BLOOD BY AUTOMATED COUNT: 20.5 % (ref 11.5–14.5)
GLOBULIN SER CALC-MCNC: 3.8 G/DL (ref 2–4)
GLUCOSE BLD STRIP.AUTO-MCNC: 108 MG/DL (ref 65–100)
GLUCOSE BLD STRIP.AUTO-MCNC: 115 MG/DL (ref 65–100)
GLUCOSE BLD STRIP.AUTO-MCNC: 92 MG/DL (ref 65–100)
GLUCOSE SERPL-MCNC: 81 MG/DL (ref 65–100)
GLUCOSE SERPL-MCNC: 82 MG/DL (ref 65–100)
HCT VFR BLD AUTO: 28.5 % (ref 36.6–50.3)
HGB BLD-MCNC: 8.5 G/DL (ref 12.1–17)
IMM GRANULOCYTES # BLD AUTO: 0.1 K/UL (ref 0–0.04)
IMM GRANULOCYTES NFR BLD AUTO: 1 % (ref 0–0.5)
LYMPHOCYTES # BLD: 0.4 K/UL (ref 0.8–3.5)
LYMPHOCYTES NFR BLD: 8 % (ref 12–49)
MCH RBC QN AUTO: 27.7 PG (ref 26–34)
MCHC RBC AUTO-ENTMCNC: 29.8 G/DL (ref 30–36.5)
MCV RBC AUTO: 92.8 FL (ref 80–99)
MONOCYTES # BLD: 0.3 K/UL (ref 0–1)
MONOCYTES NFR BLD: 5 % (ref 5–13)
NEUTS SEG # BLD: 4.5 K/UL (ref 1.8–8)
NEUTS SEG NFR BLD: 84 % (ref 32–75)
PERFORMED BY, TECHID: ABNORMAL
PERFORMED BY, TECHID: ABNORMAL
PERFORMED BY, TECHID: NORMAL
PHOSPHATE SERPL-MCNC: 1.4 MG/DL (ref 2.6–4.7)
PLATELET # BLD AUTO: 121 K/UL (ref 150–400)
PMV BLD AUTO: 11.9 FL (ref 8.9–12.9)
POTASSIUM SERPL-SCNC: 3.7 MMOL/L (ref 3.5–5.1)
POTASSIUM SERPL-SCNC: 3.8 MMOL/L (ref 3.5–5.1)
PROT SERPL-MCNC: 5.8 G/DL (ref 6.4–8.2)
RBC # BLD AUTO: 3.07 M/UL (ref 4.1–5.7)
SODIUM SERPL-SCNC: 140 MMOL/L (ref 136–145)
SODIUM SERPL-SCNC: 142 MMOL/L (ref 136–145)
WBC # BLD AUTO: 5.3 K/UL (ref 4.1–11.1)

## 2021-03-31 PROCEDURE — 82962 GLUCOSE BLOOD TEST: CPT

## 2021-03-31 PROCEDURE — 80069 RENAL FUNCTION PANEL: CPT

## 2021-03-31 PROCEDURE — 36415 COLL VENOUS BLD VENIPUNCTURE: CPT

## 2021-03-31 PROCEDURE — 85025 COMPLETE CBC W/AUTO DIFF WBC: CPT

## 2021-03-31 PROCEDURE — 74011250636 HC RX REV CODE- 250/636: Performed by: INTERNAL MEDICINE

## 2021-03-31 PROCEDURE — 74011000250 HC RX REV CODE- 250: Performed by: INTERNAL MEDICINE

## 2021-03-31 PROCEDURE — 74011250637 HC RX REV CODE- 250/637: Performed by: FAMILY MEDICINE

## 2021-03-31 PROCEDURE — 80053 COMPREHEN METABOLIC PANEL: CPT

## 2021-03-31 PROCEDURE — 65270000029 HC RM PRIVATE

## 2021-03-31 RX ADMIN — SODIUM CHLORIDE: 234 INJECTION, SOLUTION, CONCENTRATE INTRAVENOUS at 11:27

## 2021-03-31 RX ADMIN — CEFTRIAXONE SODIUM 1 G: 1 INJECTION, POWDER, FOR SOLUTION INTRAMUSCULAR; INTRAVENOUS at 14:00

## 2021-03-31 RX ADMIN — ACETAMINOPHEN 650 MG: 325 TABLET, FILM COATED ORAL at 03:08

## 2021-03-31 NOTE — PROGRESS NOTES
Bedside and Verbal shift change report given to MANISH Hinojosa (oncoming nurse) by Ramya BUENO RN (offgoing nurse). Report included the following information SBAR, Kardex, Procedure Summary, Intake/Output, MAR and Recent Results.

## 2021-03-31 NOTE — PROGRESS NOTES
General Daily Progress Note          Patient Name:   Luther Davis       YOB: 1945       Age:  66 y.o. Admit Date: 3/26/2021      Subjective:     75yom with a history of dementia, stroke, COVID, DVT, CKD presented with lethargy and weakness and was diagnosed with JAE and hyperkalemia. Today, patient seen resting in bed and nonconversant (baseline). Hgb 8.5, Hct  28.5. NG tube removed. Niece was contacted and agreed to [facility] long term care as discharge plan. Speech recommends puree/nectar diet with 1:1 assistance for all PO intake. /64  P 84  T 98 F  R 18          Objective:     Visit Vitals  /64 (BP 1 Location: Left leg, BP Patient Position: Lying left side)   Pulse 84   Temp 98 °F (36.7 °C)   Resp 13   Ht 6' 2.02\" (1.88 m)   Wt 152 lb 12.5 oz (69.3 kg)   SpO2 92%   BMI 19.61 kg/m²        Recent Results (from the past 24 hour(s))   GLUCOSE, POC    Collection Time: 03/30/21  3:32 PM   Result Value Ref Range    Glucose (POC) 111 (H) 65 - 100 mg/dL    Performed by Chegg    GLUCOSE, POC    Collection Time: 03/30/21  7:33 PM   Result Value Ref Range    Glucose (POC) 84 65 - 100 mg/dL    Performed by Amanda Greco    GLUCOSE, POC    Collection Time: 03/31/21  7:32 AM   Result Value Ref Range    Glucose (POC) 108 (H) 65 - 100 mg/dL    Performed by CRISTAL BATRES    CBC WITH AUTOMATED DIFF    Collection Time: 03/31/21  9:28 AM   Result Value Ref Range    WBC 5.3 4.1 - 11.1 K/uL    RBC 3.07 (L) 4.10 - 5.70 M/uL    HGB 8.5 (L) 12.1 - 17.0 g/dL    HCT 28.5 (L) 36.6 - 50.3 %    MCV 92.8 80.0 - 99.0 FL    MCH 27.7 26.0 - 34.0 PG    MCHC 29.8 (L) 30.0 - 36.5 g/dL    RDW 20.5 (H) 11.5 - 14.5 %    PLATELET 036 (L) 786 - 400 K/uL    MPV 11.9 8.9 - 12.9 FL    NEUTROPHILS 84 (H) 32 - 75 %    LYMPHOCYTES 8 (L) 12 - 49 %    MONOCYTES 5 5 - 13 %    EOSINOPHILS 2 0 - 7 %    BASOPHILS 0 0 - 1 %    IMMATURE GRANULOCYTES 1 (H) 0.0 - 0.5 %    ABS. NEUTROPHILS 4.5 1.8 - 8.0 K/UL    ABS. LYMPHOCYTES 0.4 (L) 0.8 - 3.5 K/UL    ABS. MONOCYTES 0.3 0.0 - 1.0 K/UL    ABS. EOSINOPHILS 0.1 0.0 - 0.4 K/UL    ABS. BASOPHILS 0.0 0.0 - 0.1 K/UL    ABS. IMM. GRANS. 0.1 (H) 0.00 - 0.04 K/UL    DF AUTOMATED     METABOLIC PANEL, COMPREHENSIVE    Collection Time: 03/31/21  9:28 AM   Result Value Ref Range    Sodium 140 136 - 145 mmol/L    Potassium 3.8 3.5 - 5.1 mmol/L    Chloride 112 (H) 97 - 108 mmol/L    CO2 23 21 - 32 mmol/L    Anion gap 5 5 - 15 mmol/L    Glucose 82 65 - 100 mg/dL    BUN 28 (H) 6 - 20 mg/dL    Creatinine 1.23 0.70 - 1.30 mg/dL    BUN/Creatinine ratio 23 (H) 12 - 20      GFR est AA >60 >60 ml/min/1.73m2    GFR est non-AA 57 (L) >60 ml/min/1.73m2    Calcium 8.4 (L) 8.5 - 10.1 mg/dL    Bilirubin, total 0.6 0.2 - 1.0 mg/dL    AST (SGOT) 77 (H) 15 - 37 U/L    ALT (SGPT) 58 12 - 78 U/L    Alk. phosphatase 122 (H) 45 - 117 U/L    Protein, total 5.8 (L) 6.4 - 8.2 g/dL    Albumin 2.0 (L) 3.5 - 5.0 g/dL    Globulin 3.8 2.0 - 4.0 g/dL    A-G Ratio 0.5 (L) 1.1 - 2.2     RENAL FUNCTION PANEL    Collection Time: 03/31/21  9:28 AM   Result Value Ref Range    Sodium 142 136 - 145 mmol/L    Potassium 3.7 3.5 - 5.1 mmol/L    Chloride 112 (H) 97 - 108 mmol/L    CO2 23 21 - 32 mmol/L    Anion gap 7 5 - 15 mmol/L    Glucose 81 65 - 100 mg/dL    BUN 28 (H) 6 - 20 mg/dL    Creatinine 1.22 0.70 - 1.30 mg/dL    BUN/Creatinine ratio 23 (H) 12 - 20      GFR est AA >60 >60 ml/min/1.73m2    GFR est non-AA 58 (L) >60 ml/min/1.73m2    Calcium 8.2 (L) 8.5 - 10.1 mg/dL    Phosphorus 1.4 (L) 2.6 - 4.7 mg/dL    Albumin 2.0 (L) 3.5 - 5.0 g/dL   GLUCOSE, POC    Collection Time: 03/31/21 10:56 AM   Result Value Ref Range    Glucose (POC) 92 65 - 100 mg/dL    Performed by Javid Umana (SON)            Review of Systems    Unable to obtain      Physical Exam:      Constitutional: Awake, nonverbal mumbles  Head: Normocephalic and atraumatic. Dried blood in mouth and around nares.   Eyes: Pupils are equal, round, and reactive to light. EOM are normal.   Cardiovascular: Normal rate, regular rhythm and normal heart sounds. Pulmonary/Chest: Breath sounds normal. No wheezes. No rales. Exhibits no tenderness. Abdominal: Soft. Bowel sounds are normal. There is no abdominal tenderness. There is no rebound and no guarding. Musculoskeletal: Normal range of motion. Neurological: Contracted leg, unintelligible speech, functional quadriplegic  Skin: wounds on right hip, left medial knee, gluteal cleft     XR CHEST PORT   Final Result   NG tube position as above. Worsening lung aeration. CT ABD PELV WO CONT   Final Result   No acute abdominopelvic abnormality. Nonobstructing bilateral renal   calculi. Correlate for constipation given increased stool burden. End-stage   degenerative changes of the hips with postoperative change in the right   acetabulum. Pulmonary interstitial abnormality partially visualized in the acute   setting would suggest infection/inflammation or edema versus a combination of   these and in any case with early airspace component at the right lung base. CT HEAD WO CONT   Final Result   No acute intracranial abnormality. 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 Mild   generalized atrophy with nonspecific white matter abnormality that may indicate   chronic small vessel disease. 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 XR CHEST PORT   Final Result   Rotated exam. Diffuse interstitial abnormality and with question of   some superimposed nodular areas. In the acute setting edema, infection or   combination of these is considered however, especially given the possible   nodular opacities, follow-up to radiographic resolution and/or further   evaluation with chest CT if symptoms/findings do not resolve as expected with   treatment, or if indicated otherwise.                 Recent Results (from the past 24 hour(s))   GLUCOSE, POC Collection Time: 03/30/21  3:32 PM   Result Value Ref Range    Glucose (POC) 111 (H) 65 - 100 mg/dL    Performed by Dawood Bautista    GLUCOSE, POC    Collection Time: 03/30/21  7:33 PM   Result Value Ref Range    Glucose (POC) 84 65 - 100 mg/dL    Performed by Nataliia Andrews    GLUCOSE, POC    Collection Time: 03/31/21  7:32 AM   Result Value Ref Range    Glucose (POC) 108 (H) 65 - 100 mg/dL    Performed by CRISTAL BATRES    CBC WITH AUTOMATED DIFF    Collection Time: 03/31/21  9:28 AM   Result Value Ref Range    WBC 5.3 4.1 - 11.1 K/uL    RBC 3.07 (L) 4.10 - 5.70 M/uL    HGB 8.5 (L) 12.1 - 17.0 g/dL    HCT 28.5 (L) 36.6 - 50.3 %    MCV 92.8 80.0 - 99.0 FL    MCH 27.7 26.0 - 34.0 PG    MCHC 29.8 (L) 30.0 - 36.5 g/dL    RDW 20.5 (H) 11.5 - 14.5 %    PLATELET 007 (L) 168 - 400 K/uL    MPV 11.9 8.9 - 12.9 FL    NEUTROPHILS 84 (H) 32 - 75 %    LYMPHOCYTES 8 (L) 12 - 49 %    MONOCYTES 5 5 - 13 %    EOSINOPHILS 2 0 - 7 %    BASOPHILS 0 0 - 1 %    IMMATURE GRANULOCYTES 1 (H) 0.0 - 0.5 %    ABS. NEUTROPHILS 4.5 1.8 - 8.0 K/UL    ABS. LYMPHOCYTES 0.4 (L) 0.8 - 3.5 K/UL    ABS. MONOCYTES 0.3 0.0 - 1.0 K/UL    ABS. EOSINOPHILS 0.1 0.0 - 0.4 K/UL    ABS. BASOPHILS 0.0 0.0 - 0.1 K/UL    ABS. IMM. GRANS. 0.1 (H) 0.00 - 0.04 K/UL    DF AUTOMATED     METABOLIC PANEL, COMPREHENSIVE    Collection Time: 03/31/21  9:28 AM   Result Value Ref Range    Sodium 140 136 - 145 mmol/L    Potassium 3.8 3.5 - 5.1 mmol/L    Chloride 112 (H) 97 - 108 mmol/L    CO2 23 21 - 32 mmol/L    Anion gap 5 5 - 15 mmol/L    Glucose 82 65 - 100 mg/dL    BUN 28 (H) 6 - 20 mg/dL    Creatinine 1.23 0.70 - 1.30 mg/dL    BUN/Creatinine ratio 23 (H) 12 - 20      GFR est AA >60 >60 ml/min/1.73m2    GFR est non-AA 57 (L) >60 ml/min/1.73m2    Calcium 8.4 (L) 8.5 - 10.1 mg/dL    Bilirubin, total 0.6 0.2 - 1.0 mg/dL    AST (SGOT) 77 (H) 15 - 37 U/L    ALT (SGPT) 58 12 - 78 U/L    Alk.  phosphatase 122 (H) 45 - 117 U/L    Protein, total 5.8 (L) 6.4 - 8.2 g/dL    Albumin 2.0 (L) 3.5 - 5.0 g/dL    Globulin 3.8 2.0 - 4.0 g/dL    A-G Ratio 0.5 (L) 1.1 - 2.2     RENAL FUNCTION PANEL    Collection Time: 03/31/21  9:28 AM   Result Value Ref Range    Sodium 142 136 - 145 mmol/L    Potassium 3.7 3.5 - 5.1 mmol/L    Chloride 112 (H) 97 - 108 mmol/L    CO2 23 21 - 32 mmol/L    Anion gap 7 5 - 15 mmol/L    Glucose 81 65 - 100 mg/dL    BUN 28 (H) 6 - 20 mg/dL    Creatinine 1.22 0.70 - 1.30 mg/dL    BUN/Creatinine ratio 23 (H) 12 - 20      GFR est AA >60 >60 ml/min/1.73m2    GFR est non-AA 58 (L) >60 ml/min/1.73m2    Calcium 8.2 (L) 8.5 - 10.1 mg/dL    Phosphorus 1.4 (L) 2.6 - 4.7 mg/dL    Albumin 2.0 (L) 3.5 - 5.0 g/dL   GLUCOSE, POC    Collection Time: 03/31/21 10:56 AM   Result Value Ref Range    Glucose (POC) 92 65 - 100 mg/dL    Performed by Cindy Bess (GALLITO)        Results     Procedure Component Value Units Date/Time    CULTURE, URINE [[id number]] Collected: 03/27/21 1700    Order Status: Completed Specimen: Urine Updated: 03/29/21 1114     Special Requests: No Special Requests        Culture result: No Growth (<1000 cfu/mL)       COVID-19 RAPID TEST [[id number]] Collected: 03/27/21 0730    Order Status: Completed Specimen: Nasopharyngeal Updated: 03/27/21 0813     Specimen source Nasopharyngeal        COVID-19 rapid test Not Detected        Comment: Rapid Abbott ID Now   Rapid NAAT:  The specimen is NEGATIVE for SARS-CoV-2, the novel coronavirus associated with COVID-19. Negative results should be treated as presumptive and, if inconsistent with clinical signs and symptoms or necessary for patient management, should be tested with an alternative molecular assay. Negative results do not preclude SARS-CoV-2 infection and should not be used as the sole basis for patient management decisions. This test has been authorized by the FDA under   an Emergency Use Authorization (EUA) for use by authorized laboratories.  Fact sheet for Healthcare Providers: ConventionUpdate.co.nz Fact sheet for Patients: ConventionUpdate.co.nz   Methodology: Isothermal Nucleic Acid Amplification         CULTURE, BLOOD, PAIRED [[id number]] Collected: 03/26/21 2045    Order Status: Completed Specimen: Blood Updated: 03/31/21 0840     Special Requests: No Special Requests        Culture result: No growth 4 days              Labs:     Recent Labs     03/31/21  0928 03/30/21  1031   WBC 5.3 4.6   HGB 8.5* 8.4*   HCT 28.5* 28.6*   * 113*     Recent Labs     03/31/21 0928 03/30/21  0030 03/29/21  0630     142 147*  147* 154*   K 3.8  3.7 3.7  3.7 4.1   *  112* 117*  117* 122*   CO2 23  23 25  25 26   BUN 28*  28* 63*  65* 100*   CREA 1.23  1.22 2.24*  2.25* 3.19*   GLU 82  81 128*  131* 112*   CA 8.4*  8.2* 8.0*  8.4* 9.0   MG  --  2.4  --    PHOS 1.4* 1.8*  --      Recent Labs     03/31/21 0928 03/30/21 0030 03/29/21  0630   ALT 58 54 57   * 123* 119*   TBILI 0.6 0.3 0.4   TP 5.8* 5.6* 6.3*   ALB 2.0*  2.0* 2.1*  2.1* 2.4*   GLOB 3.8 3.5 3.9     Recent Labs     03/29/21  1657 03/29/21 0630 03/29/21  0030   APTT 78.8* 142.1* 105.5*      No results for input(s): FE, TIBC, PSAT, FERR in the last 72 hours. No results found for: FOL, RBCF   No results for input(s): PH, PCO2, PO2 in the last 72 hours. No results for input(s): CPK, CKNDX, TROIQ in the last 72 hours.     No lab exists for component: CPKMB  No results found for: CHOL, CHOLX, CHLST, CHOLV, HDL, HDLP, LDL, LDLC, DLDLP, TGLX, TRIGL, TRIGP, CH[facility], [facility]  Lab Results   Component Value Date/Time    Glucose (POC) 92 03/31/2021 10:56 AM    Glucose (POC) 108 (H) 03/31/2021 07:32 AM    Glucose (POC) 84 03/30/2021 07:33 PM    Glucose (POC) 111 (H) 03/30/2021 03:32 PM    Glucose (POC) 106 (H) 03/30/2021 11:20 AM     Lab Results   Component Value Date/Time    Color Yellow 03/27/2021 01:55 AM    Appearance Turbid (A) 03/27/2021 01:55 AM    Specific gravity 1.019 03/27/2021 01:55 AM    pH (UA) 5.0 03/27/2021 01:55 AM    Protein 100 (A) 03/27/2021 01:55 AM    Glucose Negative 03/27/2021 01:55 AM    Ketone Negative 03/27/2021 01:55 AM    Bilirubin Negative 03/27/2021 01:55 AM    Urobilinogen 0.1 03/27/2021 01:55 AM    Nitrites Negative 03/27/2021 01:55 AM    Leukocyte Esterase Large (A) 03/27/2021 01:55 AM    Bacteria Negative 03/27/2021 01:55 AM    Bacteria Negative 03/27/2021 01:55 AM    WBC 20-50 03/27/2021 01:55 AM    WBC 20-50 03/27/2021 01:55 AM    RBC 20-50 03/27/2021 01:55 AM    RBC 20-50 03/27/2021 01:55 AM         Assessment:        A. fib with rapid ventricular rate  Improving with HR in 80s, up to 110 with moving  Heparin discontinued due to bleeding    Acute kidney injury  Cr1.23 (8.1 on admission)  BUN 28(207 on admission)    Acute Blood Loss Anemia secondary to bleeding from nose, mouth  Hgb 8.5, Hct  28.5  Heparin drip stopped  NG tube removed: on nectar puree  Monitor H&H    Hyperkalemia  resolved    Hypernatremia  140  Corrected    UTI/negative culture    History of recent Covid    History of obstructive uropathy    Dementia    Functional quadriplegic    History of CVA    History of DVT    Hyperlipidemia          Plan:     Continue Aspirin, 325 mg, PO QD   Continue Lipitor , 40 mg, PO QD  Continue Ceftriaxone, 1g, IV QD  Stopped amiodarone drip  Started on p.o. amiodarone    STOP heparin drip  Remove NG tube    Continue with Nephrology and cardiology consult    Monitor kidney function, electrolyte imbalances  Monitor H&H            Current Facility-Administered Medications:     dextrose 5% 1,000 mL with sodium chloride 4 MEQ/ML (23.4%) 34.2 mEq infusion, , IntraVENous, CONTINUOUS, Ray Simmons MD, Last Rate: 125 mL/hr at 03/30/21 1656, Rate Change at 03/30/21 1656    amiodarone (CORDARONE) tablet 400 mg, 400 mg, Oral, BID, Romy FREY MD, 400 mg at 03/30/21 1731    lactated Ringers infusion 1,000 mL, 1,000 mL, IntraVENous, CONTINUOUS, Courtney Moon MD, Stopped at 03/27/21 4724    lactated Ringers infusion, 125 mL/hr, IntraVENous, CONTINUOUS, Carlene Moon MD, Last Rate: 125 mL/hr at 03/27/21 0346, 125 mL/hr at 03/27/21 0346    lactated Ringers infusion 1,000 mL, 1,000 mL, IntraVENous, CONTINUOUS, Veronica Moon MD, Stopped at 03/27/21 0535    acetaminophen (TYLENOL) tablet 650 mg, 650 mg, Oral, Q6H PRN, 650 mg at 03/31/21 0308 **OR** acetaminophen (TYLENOL) suppository 650 mg, 650 mg, Rectal, Q6H PRN, Veronica Moon MD    polyethylene glycol (MIRALAX) packet 17 g, 17 g, Oral, DAILY PRN, Veronica Moon MD    ondansetron (ZOFRAN ODT) tablet 4 mg, 4 mg, Oral, Q8H PRN **OR** ondansetron (ZOFRAN) injection 4 mg, 4 mg, IntraVENous, Q6H PRN, Carlene Moon MD    heparin 25,000 units in D5W 250 ml infusion, 12-25 Units/kg/hr, IntraVENous, TITRATE, Kyra Barrera MD, Stopped at 03/30/21 0037    heparin (porcine) 1,000 unit/mL injection 3,400 Units, 60 Units/kg, IntraVENous, PRN, 30 Units at 03/29/21 1814 **OR** heparin (porcine) 1,000 unit/mL injection 1,700 Units, 30 Units/kg, IntraVENous, PRN, Kyra Barrera MD, 1,700 Units at 03/28/21 0406    cefTRIAXone (ROCEPHIN) 1 g in sterile water (preservative free) 10 mL IV syringe, 1 g, IntraVENous, Q24H, Brendan Coreas MD, 1 g at 03/30/21 1404    aspirin tablet 325 mg, 325 mg, Oral, DAILY, Carlene Moon MD, 325 mg at 03/29/21 1019    atorvastatin (LIPITOR) tablet 40 mg, 40 mg, Oral, QHS, Carlene Moon MD, 40 mg at 03/30/21 2250    lactated Ringers infusion 1,000 mL, 1,000 mL, IntraVENous, CONTINUOUS, Carlene Moon MD, 1,000 mL at 03/26/21 1092

## 2021-03-31 NOTE — PROGRESS NOTES
Patient refusing to eat and take medications. Dietician to let MD know and possibly place an NG tube.

## 2021-03-31 NOTE — PROGRESS NOTES
Notified catheter was still leaking approximately 2 hours after secured and checked placement. Inserted new catheter and when tugged on noticed was not staying place and was leaking around penis. Notified primary nurse, Jose Coronado, that a 30mL balloon catheter should be inserted to see if the balloon needed to be larger to seal off the bladder. Nurse called [employer] called to retrieve catheter.

## 2021-03-31 NOTE — WOUND CARE
IP WOUND CONSULT Yaz Pedersen MEDICAL RECORD NUMBER:  [id number] AGE: 66 y.o. GENDER: male  : 1945 TODAY'S DATE:  3/31/2021 GENERAL [x] Follow-up [x] New Consult Yaz Pedersen is a 66 y.o. male referred by:  
[] Physician 
[x] Nursing 
[] Other: PAST MEDICAL HISTORY Past Medical History:  
Diagnosis Date  Anemia  BPH (benign prostatic hyperplasia)  CKD (chronic kidney disease)  CVA (cerebral vascular accident) ([facility] [address]  Dementia ([facility]  DVT (deep venous thrombosis) ([facility]  GERD (gastroesophageal reflux disease)  Glaucoma  Hyperlipemia PAST SURGICAL HISTORY No past surgical history on file. FAMILY HISTORY No family history on file. ALLERGIES Allergies Allergen Reactions  Flonase [Fluticasone] Unknown (comments) MEDICATIONS No current facility-administered medications on file prior to encounter. No current outpatient medications on file prior to encounter. [unfilled] Visit Vitals /64 (BP 1 Location: Left leg, BP Patient Position: Lying left side) Pulse 84 Temp 98 °F (36.7 °C) Resp 13 Ht 6' 2.02\" (1.88 m) Wt 69.3 kg (152 lb 12.5 oz) SpO2 92% BMI 19.61 kg/m² ASSESSMENT Wound Identification & Type: DTI to right hip Dressing change: Yes, Medihoney and ABD pad Verbal consent for picture: Cognitive Impairment Contributing Factors: anticoagulation therapy, chronic pressure, decreased mobility, shear force, incontinence of stool, incontinence of urine and malnutrition Wound Penis Distal Head of penis is split with scant amount of bloody drainage 21 (Active) Dressing/Treatment Open to air 21 Number of days: 3 Wound Hip Right sDTI 21 (Active) Wound Image   21 1119 Wound Etiology Deep Tissue/Injury 21 1119 Dressing Status Intact; New dressing applied 21 1119 Cleansed Cleansed with saline 21 1119  
Dressing/Treatment ABD pad;Honey gel/honey paste;Non-adherent 03/31/21 1119 Dressing Change Due 04/01/21 03/31/21 1119 Wound Length (cm) 2.5 cm 03/31/21 1119 Wound Width (cm) 3.3 cm 03/31/21 1119 Wound Depth (cm) 0.1 cm 03/31/21 1119 Wound Surface Area (cm^2) 8.25 cm^2 03/31/21 1119 Wound Volume (cm^3) 0.83 cm^3 03/31/21 1119 Wound Assessment Pink/red 03/31/21 1119 Drainage Amount Scant 03/31/21 1119 Drainage Description Serosanguinous 03/31/21 1119 Wound Odor None 03/31/21 1119 Keren-Wound/Incision Assessment Dry/flaky; Non-Blanchable erythema 03/31/21 1119 Edges Flat/open edges 03/31/21 1119 Number of days: 3 Wound Gluteal fold/cleft MASD 03/28/21 (Active) Cleansed Other (Comment) 03/28/21 1341 Dressing/Treatment Zinc paste 03/29/21 1959 Wound Assessment Pink/red 03/28/21 1341 Drainage Amount None 03/28/21 1341 Wound Odor None 03/28/21 1341 Keren-Wound/Incision Assessment Dry/flaky 03/28/21 1341 Number of days: 3 [REMOVED] Wound Knee Left;Medial Blanchable Erythema 03/28/21 (Removed) Wound Image   03/28/21 1338 Wound Etiology Other (Comment) 03/28/21 1338 Dressing/Treatment Open to air 03/29/21 1959 Wound Length (cm) 11 cm 03/28/21 1338 Wound Width (cm) 1 cm 03/28/21 1338 Wound Depth (cm) 0 cm 03/28/21 1338 Wound Surface Area (cm^2) 11 cm^2 03/28/21 1338 Wound Volume (cm^3) 0 cm^3 03/28/21 1338 Wound Assessment Pink/red;Erythema;Dry 03/28/21 1338 Drainage Amount None 03/28/21 1338 Wound Odor None 03/28/21 1338 Keren-Wound/Incision Assessment Dry/flaky; Intact 03/28/21 1338 Number of days: 3 PLAN Skin Care & Pressure Relief Recommendations Speciality bed Isogel pressure reduction bed with air pump Minimize layers of linen Turn/reposition approximately every 2 hours Pillow wedges Manage incontinence Promote continence; Skin Protective lotion/cream to buttocks and sacrum daily and as needed with incontinence care Offloading boots Blood Glucose: 108 on 3/31/21 Albumin: 2.1 on 3/30/21 WBCs: 4.6 on 3/30/21 Physician/Provider notified:  
Recommendations: sDTI to right hip has opened. See order for dressing change daily. Blanchable erythema to left inner knee is resolved. No new wounds. Continue with current orders. Teaching completed with:  
[] Patient          
[] Family member      
[] Caregiver         
[] Nursing 
[] Other Patient/Caregiver Teaching: 
Level of patient/caregiver understanding able to:  
[] Indicates understanding       [] Needs reinforcement 
[] Unsuccessful      [] Verbal Understanding 
[] Demonstrated understanding       [] No evidence of learning 
[] Refused teaching         [] N/A Electronically signed by Tray aVca RN on 3/31/2021 at 11:27 AM  
 
IP WOUND CONSULT Stiven Hernández MEDICAL RECORD NUMBER:  [id number] AGE: 66 y.o. GENDER: male  : 1945 TODAY'S DATE:  3/31/2021 GENERAL  
 
[] Follow-up [] New Consult Stiven Hernández is a 66 y.o. male referred by:  
[] Physician 
[] Nursing 
[] Other: PAST MEDICAL HISTORY Past Medical History:  
Diagnosis Date  Anemia  BPH (benign prostatic hyperplasia)  CKD (chronic kidney disease)  CVA (cerebral vascular accident) ([facility] [address]  Dementia ([facility] [address]  DVT (deep venous thrombosis) ([facility] [address]  GERD (gastroesophageal reflux disease)  Glaucoma  Hyperlipemia PAST SURGICAL HISTORY No past surgical history on file. FAMILY HISTORY No family history on file. ALLERGIES Allergies Allergen Reactions  Flonase [Fluticasone] Unknown (comments) MEDICATIONS No current facility-administered medications on file prior to encounter. No current outpatient medications on file prior to encounter. [unfilled] Visit Vitals /64 (BP 1 Location: Left leg, BP Patient Position: Lying left side) Pulse 84 Temp 98 °F (36.7 °C) Resp 13 Ht 6' 2.02\" (1.88 m) Wt 69.3 kg (152 lb 12.5 oz) SpO2 92% BMI 19.61 kg/m² ASSESSMENT Wound Identification & Type:  
Dressing change: 
Verbal consent for picture: 
 
Contributing Factors:  
 
Wound Penis Distal Head of penis is split with scant amount of bloody drainage 03/28/21 (Active) Dressing/Treatment Open to air 03/29/21 1959 Number of days: 3 Wound Hip Right sDTI 03/28/21 (Active) Wound Image   03/31/21 1119 Wound Etiology Deep Tissue/Injury 03/31/21 1119 Dressing Status Intact; New dressing applied 03/31/21 1119 Cleansed Cleansed with saline 03/31/21 1119 Dressing/Treatment ABD pad;Honey gel/honey paste;Non-adherent 03/31/21 1119 Dressing Change Due 04/01/21 03/31/21 1119 Wound Length (cm) 2.5 cm 03/31/21 1119 Wound Width (cm) 3.3 cm 03/31/21 1119 Wound Depth (cm) 0.1 cm 03/31/21 1119 Wound Surface Area (cm^2) 8.25 cm^2 03/31/21 1119 Wound Volume (cm^3) 0.83 cm^3 03/31/21 1119 Wound Assessment Pink/red 03/31/21 1119 Drainage Amount Scant 03/31/21 1119 Drainage Description Serosanguinous 03/31/21 1119 Wound Odor None 03/31/21 1119 Keren-Wound/Incision Assessment Dry/flaky; Non-Blanchable erythema 03/31/21 1119 Edges Flat/open edges 03/31/21 1119 Number of days: 3 Wound Gluteal fold/cleft MASD 03/28/21 (Active) Cleansed Other (Comment) 03/28/21 1341 Dressing/Treatment Zinc paste 03/29/21 1959 Wound Assessment Pink/red 03/28/21 1341 Drainage Amount None 03/28/21 1341 Wound Odor None 03/28/21 1341 Keren-Wound/Incision Assessment Dry/flaky 03/28/21 1341 Number of days: 3 [REMOVED] Wound Knee Left;Medial Blanchable Erythema 03/28/21 (Removed) Wound Image   03/28/21 1338 Wound Etiology Other (Comment) 03/28/21 1338 Dressing/Treatment Open to air 03/29/21 1959 Wound Length (cm) 11 cm 03/28/21 1338 Wound Width (cm) 1 cm 03/28/21 1338 Wound Depth (cm) 0 cm 03/28/21 1338 Wound Surface Area (cm^2) 11 cm^2 03/28/21 1338 Wound Volume (cm^3) 0 cm^3 03/28/21 1338 Wound Assessment Pink/red;Erythema;Dry 03/28/21 1338 Drainage Amount None 03/28/21 1338 Wound Odor None 03/28/21 1338 Keren-Wound/Incision Assessment Dry/flaky; Intact 03/28/21 1338 Number of days: 3 PLAN Skin Care & Pressure Relief Recommendations Blood Glucose: Albumin: 
 
Physician/Provider notified:  
Recommendations:  
 
Teaching completed with:  
[] Patient          
[] Family member      
[] Caregiver         
[] Nursing 
[] Other Patient/Caregiver Teaching: 
Level of patient/caregiver understanding able to:  
[] Indicates understanding       [] Needs reinforcement 
[] Unsuccessful      [] Verbal Understanding 
[] Demonstrated understanding       [] No evidence of learning 
[] Refused teaching         [] N/A Electronically signed by Emilie Yang RN on 3/31/2021 at 11:27 AM

## 2021-03-31 NOTE — PROGRESS NOTES
General Daily Progress Note          Patient Name:   [facility]       YOB: 1945       Age:  66 y.o. Admit Date: 3/26/2021      Subjective:     75yom with a history of dementia, stroke, COVID, DVT, CKD presented with lethargy and weakness and was diagnosed with JAE and hyperkalemia. Today, patient seen resting in bed and nonconversant (baseline). Hgb 8.5, Hct  28.5. NG tube removed. Patient not eating drinking at all    Niece was contacted and agreed to [facility] long term care as discharge plan. Speech recommends puree/nectar diet with 1:1 assistance for all PO intake. /64  P 84  T 98 F  R 18          Objective:     Visit Vitals  /64 (BP 1 Location: Right upper arm, BP Patient Position: At rest)   Pulse 86   Temp 97.6 °F (36.4 °C)   Resp 16   Ht 6' 2.02\" (1.88 m)   Wt 69.3 kg (152 lb 12.5 oz)   SpO2 98%   BMI 19.61 kg/m²        Recent Results (from the past 24 hour(s))   GLUCOSE, POC    Collection Time: 03/30/21  7:33 PM   Result Value Ref Range    Glucose (POC) 84 65 - 100 mg/dL    Performed by Anushka Schumacher    GLUCOSE, POC    Collection Time: 03/31/21  7:32 AM   Result Value Ref Range    Glucose (POC) 108 (H) 65 - 100 mg/dL    Performed by CRISTAL BATRES    CBC WITH AUTOMATED DIFF    Collection Time: 03/31/21  9:28 AM   Result Value Ref Range    WBC 5.3 4.1 - 11.1 K/uL    RBC 3.07 (L) 4.10 - 5.70 M/uL    HGB 8.5 (L) 12.1 - 17.0 g/dL    HCT 28.5 (L) 36.6 - 50.3 %    MCV 92.8 80.0 - 99.0 FL    MCH 27.7 26.0 - 34.0 PG    MCHC 29.8 (L) 30.0 - 36.5 g/dL    RDW 20.5 (H) 11.5 - 14.5 %    PLATELET 983 (L) 895 - 400 K/uL    MPV 11.9 8.9 - 12.9 FL    NEUTROPHILS 84 (H) 32 - 75 %    LYMPHOCYTES 8 (L) 12 - 49 %    MONOCYTES 5 5 - 13 %    EOSINOPHILS 2 0 - 7 %    BASOPHILS 0 0 - 1 %    IMMATURE GRANULOCYTES 1 (H) 0.0 - 0.5 %    ABS. NEUTROPHILS 4.5 1.8 - 8.0 K/UL    ABS. LYMPHOCYTES 0.4 (L) 0.8 - 3.5 K/UL    ABS. MONOCYTES 0.3 0.0 - 1.0 K/UL    ABS.  EOSINOPHILS 0.1 0.0 - 0.4 K/UL ABS. BASOPHILS 0.0 0.0 - 0.1 K/UL    ABS. IMM. GRANS. 0.1 (H) 0.00 - 0.04 K/UL    DF AUTOMATED     METABOLIC PANEL, COMPREHENSIVE    Collection Time: 03/31/21  9:28 AM   Result Value Ref Range    Sodium 140 136 - 145 mmol/L    Potassium 3.8 3.5 - 5.1 mmol/L    Chloride 112 (H) 97 - 108 mmol/L    CO2 23 21 - 32 mmol/L    Anion gap 5 5 - 15 mmol/L    Glucose 82 65 - 100 mg/dL    BUN 28 (H) 6 - 20 mg/dL    Creatinine 1.23 0.70 - 1.30 mg/dL    BUN/Creatinine ratio 23 (H) 12 - 20      GFR est AA >60 >60 ml/min/1.73m2    GFR est non-AA 57 (L) >60 ml/min/1.73m2    Calcium 8.4 (L) 8.5 - 10.1 mg/dL    Bilirubin, total 0.6 0.2 - 1.0 mg/dL    AST (SGOT) 77 (H) 15 - 37 U/L    ALT (SGPT) 58 12 - 78 U/L    Alk. phosphatase 122 (H) 45 - 117 U/L    Protein, total 5.8 (L) 6.4 - 8.2 g/dL    Albumin 2.0 (L) 3.5 - 5.0 g/dL    Globulin 3.8 2.0 - 4.0 g/dL    A-G Ratio 0.5 (L) 1.1 - 2.2     RENAL FUNCTION PANEL    Collection Time: 03/31/21  9:28 AM   Result Value Ref Range    Sodium 142 136 - 145 mmol/L    Potassium 3.7 3.5 - 5.1 mmol/L    Chloride 112 (H) 97 - 108 mmol/L    CO2 23 21 - 32 mmol/L    Anion gap 7 5 - 15 mmol/L    Glucose 81 65 - 100 mg/dL    BUN 28 (H) 6 - 20 mg/dL    Creatinine 1.22 0.70 - 1.30 mg/dL    BUN/Creatinine ratio 23 (H) 12 - 20      GFR est AA >60 >60 ml/min/1.73m2    GFR est non-AA 58 (L) >60 ml/min/1.73m2    Calcium 8.2 (L) 8.5 - 10.1 mg/dL    Phosphorus 1.4 (L) 2.6 - 4.7 mg/dL    Albumin 2.0 (L) 3.5 - 5.0 g/dL   GLUCOSE, POC    Collection Time: 03/31/21 10:56 AM   Result Value Ref Range    Glucose (POC) 92 65 - 100 mg/dL    Performed by Ricardo Branch ([facility])    GLUCOSE, POC    Collection Time: 03/31/21  4:01 PM   Result Value Ref Range    Glucose (POC) 115 (H) 65 - 100 mg/dL    Performed by Arville Metro            Review of Systems    Unable to obtain      Physical Exam:      Constitutional: Awake, nonverbal mumbles  Head: Normocephalic and atraumatic. Dried blood in mouth and around nares.   Eyes: Pupils are equal, round, and reactive to light. EOM are normal.   Cardiovascular: Normal rate, regular rhythm and normal heart sounds. Pulmonary/Chest: Breath sounds normal. No wheezes. No rales. Exhibits no tenderness. Abdominal: Soft. Bowel sounds are normal. There is no abdominal tenderness. There is no rebound and no guarding. Musculoskeletal: Normal range of motion. Neurological: Contracted leg, unintelligible speech, functional quadriplegic  Skin: wounds on right hip, left medial knee, gluteal cleft     XR CHEST PORT   Final Result   NG tube position as above. Worsening lung aeration. CT ABD PELV WO CONT   Final Result   No acute abdominopelvic abnormality. Nonobstructing bilateral renal   calculi. Correlate for constipation given increased stool burden. End-stage   degenerative changes of the hips with postoperative change in the right   acetabulum. Pulmonary interstitial abnormality partially visualized in the acute   setting would suggest infection/inflammation or edema versus a combination of   these and in any case with early airspace component at the right lung base. CT HEAD WO CONT   Final Result   No acute intracranial abnormality. 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 Mild   generalized atrophy with nonspecific white matter abnormality that may indicate   chronic small vessel disease. 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 XR CHEST PORT   Final Result   Rotated exam. Diffuse interstitial abnormality and with question of   some superimposed nodular areas. In the acute setting edema, infection or   combination of these is considered however, especially given the possible   nodular opacities, follow-up to radiographic resolution and/or further   evaluation with chest CT if symptoms/findings do not resolve as expected with   treatment, or if indicated otherwise.                 Recent Results (from the past 24 hour(s))   GLUCOSE, POC    Collection Time: 03/30/21  7:33 PM   Result Value Ref Range    Glucose (POC) 84 65 - 100 mg/dL    Performed by Krista Gibson    GLUCOSE, POC    Collection Time: 03/31/21  7:32 AM   Result Value Ref Range    Glucose (POC) 108 (H) 65 - 100 mg/dL    Performed by CRISTAL BATRES    CBC WITH AUTOMATED DIFF    Collection Time: 03/31/21  9:28 AM   Result Value Ref Range    WBC 5.3 4.1 - 11.1 K/uL    RBC 3.07 (L) 4.10 - 5.70 M/uL    HGB 8.5 (L) 12.1 - 17.0 g/dL    HCT 28.5 (L) 36.6 - 50.3 %    MCV 92.8 80.0 - 99.0 FL    MCH 27.7 26.0 - 34.0 PG    MCHC 29.8 (L) 30.0 - 36.5 g/dL    RDW 20.5 (H) 11.5 - 14.5 %    PLATELET 177 (L) 861 - 400 K/uL    MPV 11.9 8.9 - 12.9 FL    NEUTROPHILS 84 (H) 32 - 75 %    LYMPHOCYTES 8 (L) 12 - 49 %    MONOCYTES 5 5 - 13 %    EOSINOPHILS 2 0 - 7 %    BASOPHILS 0 0 - 1 %    IMMATURE GRANULOCYTES 1 (H) 0.0 - 0.5 %    ABS. NEUTROPHILS 4.5 1.8 - 8.0 K/UL    ABS. LYMPHOCYTES 0.4 (L) 0.8 - 3.5 K/UL    ABS. MONOCYTES 0.3 0.0 - 1.0 K/UL    ABS. EOSINOPHILS 0.1 0.0 - 0.4 K/UL    ABS. BASOPHILS 0.0 0.0 - 0.1 K/UL    ABS. IMM. GRANS. 0.1 (H) 0.00 - 0.04 K/UL    DF AUTOMATED     METABOLIC PANEL, COMPREHENSIVE    Collection Time: 03/31/21  9:28 AM   Result Value Ref Range    Sodium 140 136 - 145 mmol/L    Potassium 3.8 3.5 - 5.1 mmol/L    Chloride 112 (H) 97 - 108 mmol/L    CO2 23 21 - 32 mmol/L    Anion gap 5 5 - 15 mmol/L    Glucose 82 65 - 100 mg/dL    BUN 28 (H) 6 - 20 mg/dL    Creatinine 1.23 0.70 - 1.30 mg/dL    BUN/Creatinine ratio 23 (H) 12 - 20      GFR est AA >60 >60 ml/min/1.73m2    GFR est non-AA 57 (L) >60 ml/min/1.73m2    Calcium 8.4 (L) 8.5 - 10.1 mg/dL    Bilirubin, total 0.6 0.2 - 1.0 mg/dL    AST (SGOT) 77 (H) 15 - 37 U/L    ALT (SGPT) 58 12 - 78 U/L    Alk.  phosphatase 122 (H) 45 - 117 U/L    Protein, total 5.8 (L) 6.4 - 8.2 g/dL    Albumin 2.0 (L) 3.5 - 5.0 g/dL    Globulin 3.8 2.0 - 4.0 g/dL    A-G Ratio 0.5 (L) 1.1 - 2.2     RENAL FUNCTION PANEL Collection Time: 03/31/21  9:28 AM   Result Value Ref Range    Sodium 142 136 - 145 mmol/L    Potassium 3.7 3.5 - 5.1 mmol/L    Chloride 112 (H) 97 - 108 mmol/L    CO2 23 21 - 32 mmol/L    Anion gap 7 5 - 15 mmol/L    Glucose 81 65 - 100 mg/dL    BUN 28 (H) 6 - 20 mg/dL    Creatinine 1.22 0.70 - 1.30 mg/dL    BUN/Creatinine ratio 23 (H) 12 - 20      GFR est AA >60 >60 ml/min/1.73m2    GFR est non-AA 58 (L) >60 ml/min/1.73m2    Calcium 8.2 (L) 8.5 - 10.1 mg/dL    Phosphorus 1.4 (L) 2.6 - 4.7 mg/dL    Albumin 2.0 (L) 3.5 - 5.0 g/dL   GLUCOSE, POC    Collection Time: 03/31/21 10:56 AM   Result Value Ref Range    Glucose (POC) 92 65 - 100 mg/dL    Performed by Umang Meredith ([facility])    GLUCOSE, POC    Collection Time: 03/31/21  4:01 PM   Result Value Ref Range    Glucose (POC) 115 (H) 65 - 100 mg/dL    Performed by Aubree Lee        Results     Procedure Component Value Units Date/Time    CULTURE, URINE [[id number]] Collected: 03/27/21 1700    Order Status: Completed Specimen: Urine Updated: 03/29/21 1114     Special Requests: No Special Requests        Culture result: No Growth (<1000 cfu/mL)       COVID-19 RAPID TEST [[id number]] Collected: 03/27/21 0730    Order Status: Completed Specimen: Nasopharyngeal Updated: 03/27/21 0813     Specimen source Nasopharyngeal        COVID-19 rapid test Not Detected        Comment: Rapid Abbott ID Now   Rapid NAAT:  The specimen is NEGATIVE for SARS-CoV-2, the novel coronavirus associated with COVID-19. Negative results should be treated as presumptive and, if inconsistent with clinical signs and symptoms or necessary for patient management, should be tested with an alternative molecular assay. Negative results do not preclude SARS-CoV-2 infection and should not be used as the sole basis for patient management decisions. This test has been authorized by the FDA under   an Emergency Use Authorization (EUA) for use by authorized laboratories.  Fact sheet for Healthcare Providers: Emilyte.co.nz Fact sheet for Patients: ConventionUpdate.co.nz   Methodology: Isothermal Nucleic Acid Amplification         CULTURE, BLOOD, PAIRED [[id number]] Collected: 03/26/21 2045    Order Status: Completed Specimen: Blood Updated: 03/31/21 0840     Special Requests: No Special Requests        Culture result: No growth 4 days              Labs:     Recent Labs     03/31/21  0928 03/30/21  1031   WBC 5.3 4.6   HGB 8.5* 8.4*   HCT 28.5* 28.6*   * 113*     Recent Labs     03/31/21  0928 03/30/21  0030 03/29/21  0630     142 147*  147* 154*   K 3.8  3.7 3.7  3.7 4.1   *  112* 117*  117* 122*   CO2 23  23 25  25 26   BUN 28*  28* 63*  65* 100*   CREA 1.23  1.22 2.24*  2.25* 3.19*   GLU 82  81 128*  131* 112*   CA 8.4*  8.2* 8.0*  8.4* 9.0   MG  --  2.4  --    PHOS 1.4* 1.8*  --      Recent Labs     03/31/21  0928 03/30/21  0030 03/29/21  0630   ALT 58 54 57   * 123* 119*   TBILI 0.6 0.3 0.4   TP 5.8* 5.6* 6.3*   ALB 2.0*  2.0* 2.1*  2.1* 2.4*   GLOB 3.8 3.5 3.9     Recent Labs     03/29/21  1657 03/29/21  0630 03/29/21  0030   APTT 78.8* 142.1* 105.5*      No results for input(s): FE, TIBC, PSAT, FERR in the last 72 hours. No results found for: FOL, RBCF   No results for input(s): PH, PCO2, PO2 in the last 72 hours. No results for input(s): CPK, CKNDX, TROIQ in the last 72 hours.     No lab exists for component: CPKMB  No results found for: CHOL, CHOLX, CHLST, CHOLV, HDL, HDLP, LDL, LDLC, DLDLP, TGLX, TRIGL, TRIGP, CHHD, CHHDX  Lab Results   Component Value Date/Time    Glucose (POC) 115 (H) 03/31/2021 04:01 PM    Glucose (POC) 92 03/31/2021 10:56 AM    Glucose (POC) 108 (H) 03/31/2021 07:32 AM    Glucose (POC) 84 03/30/2021 07:33 PM    Glucose (POC) 111 (H) 03/30/2021 03:32 PM     Lab Results   Component Value Date/Time    Color Yellow 03/27/2021 01:55 AM    Appearance Turbid (A) 03/27/2021 01:55 AM    Specific gravity 1.019 03/27/2021 01:55 AM    pH (UA) 5.0 03/27/2021 01:55 AM    Protein 100 (A) 03/27/2021 01:55 AM    Glucose Negative 03/27/2021 01:55 AM    Ketone Negative 03/27/2021 01:55 AM    Bilirubin Negative 03/27/2021 01:55 AM    Urobilinogen 0.1 03/27/2021 01:55 AM    Nitrites Negative 03/27/2021 01:55 AM    Leukocyte Esterase Large (A) 03/27/2021 01:55 AM    Bacteria Negative 03/27/2021 01:55 AM    Bacteria Negative 03/27/2021 01:55 AM    WBC 20-50 03/27/2021 01:55 AM    WBC 20-50 03/27/2021 01:55 AM    RBC 20-50 03/27/2021 01:55 AM    RBC 20-50 03/27/2021 01:55 AM         Assessment:        A. fib with rapid ventricular rate  Improving with HR in 80s, up to 110 with moving  Heparin discontinued due to bleeding    Acute kidney injury  Cr1.23 (8.1 on admission)  BUN 28(207 on admission)    Acute Blood Loss Anemia secondary to bleeding from nose, mouth  Hgb 8.5, Hct  28.5  Heparin drip stopped  NG tube removed: on nectar puree  Monitor H&H    Hyperkalemia  resolved    Hypernatremia  140  Corrected    UTI/negative culture    History of recent Covid    History of obstructive uropathy    Dementia    Functional quadriplegic    History of CVA    History of DVT    Hyperlipidemia          Plan:     Continue Aspirin, 325 mg, PO QD   Continue Lipitor , 40 mg, PO QD  Continue Ceftriaxone, 1g, IV QD  Stopped amiodarone drip  Started on p.o. amiodarone    STOP heparin drip      GI consult for PEG tube placement    Continue with Nephrology and cardiology consult    Monitor kidney function, electrolyte imbalances  Monitor H&H            Current Facility-Administered Medications:     dextrose 5% 1,000 mL with sodium chloride 4 MEQ/ML (23.4%) 34.2 mEq infusion, , IntraVENous, CONTINUOUS, Ray Simmons MD, Last Rate: 125 mL/hr at 03/31/21 1127, New Bag at 03/31/21 1127    amiodarone (CORDARONE) tablet 400 mg, 400 mg, Oral, BID, Prasanth Sanchez MD, 400 mg at 03/30/21 8821    lactated Ringers infusion 1,000 mL, 1,000 mL, IntraVENous, CONTINUOUS, Zack Moon MD, Stopped at 03/27/21 0536    lactated Ringers infusion, 125 mL/hr, IntraVENous, CONTINUOUS, Carlene Moon MD, Last Rate: 125 mL/hr at 03/27/21 0346, 125 mL/hr at 03/27/21 0346    lactated Ringers infusion 1,000 mL, 1,000 mL, IntraVENous, CONTINUOUS, Zack Moon MD, Stopped at 03/27/21 0535    acetaminophen (TYLENOL) tablet 650 mg, 650 mg, Oral, Q6H PRN, 650 mg at 03/31/21 0308 **OR** acetaminophen (TYLENOL) suppository 650 mg, 650 mg, Rectal, Q6H PRN, Zack Moon MD    polyethylene glycol (MIRALAX) packet 17 g, 17 g, Oral, DAILY PRN, Zack Moon MD    ondansetron (ZOFRAN ODT) tablet 4 mg, 4 mg, Oral, Q8H PRN **OR** ondansetron (ZOFRAN) injection 4 mg, 4 mg, IntraVENous, Q6H PRN, Carlene Moon MD    heparin 25,000 units in D5W 250 ml infusion, 12-25 Units/kg/hr, IntraVENous, TITRATE, Nirmala Jacobs MD, Stopped at 03/30/21 0037    heparin (porcine) 1,000 unit/mL injection 3,400 Units, 60 Units/kg, IntraVENous, PRN, 30 Units at 03/29/21 1814 **OR** heparin (porcine) 1,000 unit/mL injection 1,700 Units, 30 Units/kg, IntraVENous, PRN, Nirmala Jacobs MD, 1,700 Units at 03/28/21 0406    cefTRIAXone (ROCEPHIN) 1 g in sterile water (preservative free) 10 mL IV syringe, 1 g, IntraVENous, Q24H, Brendan Coreas MD, 1 g at 03/31/21 1400    aspirin tablet 325 mg, 325 mg, Oral, DAILY, Carlene Moon MD, 325 mg at 03/29/21 1019    atorvastatin (LIPITOR) tablet 40 mg, 40 mg, Oral, QHS, Carlene Moon MD, 40 mg at 03/30/21 6136    lactated Ringers infusion 1,000 mL, 1,000 mL, IntraVENous, CONTINUOUS, Carlene Moon MD, 1,000 mL at 03/26/21 3478

## 2021-03-31 NOTE — PROGRESS NOTES
Problem: Risk for Spread of Infection  Goal: Prevent transmission of infectious organism to others  Description: Prevent the transmission of infectious organisms to other patients, staff members, and visitors. Outcome: Progressing Towards Goal     Problem: Patient Education:  Go to Education Activity  Goal: Patient/Family Education  Outcome: Progressing Towards Goal     Problem: Nutrition Deficit  Goal: *Optimize nutritional status  Outcome: Progressing Towards Goal     Problem: Falls - Risk of  Goal: *Absence of Falls  Description: Document [place] Fall Risk and appropriate interventions in the flowsheet. Outcome: Progressing Towards Goal  Note: Fall Risk Interventions:       Mentation Interventions: Bed/chair exit alarm, Adequate sleep, hydration, pain control         Elimination Interventions: Bed/chair exit alarm, Call light in reach    History of Falls Interventions: Bed/chair exit alarm         Problem: Patient Education: Go to Patient Education Activity  Goal: Patient/Family Education  Outcome: Progressing Towards Goal     Problem: Pressure Injury - Risk of  Goal: *Prevention of pressure injury  Description: Document Stephen Scale and appropriate interventions in the flowsheet.   Outcome: Progressing Towards Goal  Note: Pressure Injury Interventions:  Sensory Interventions: Assess changes in LOC, Keep linens dry and wrinkle-free, Maintain/enhance activity level, Minimize linen layers    Moisture Interventions: Absorbent underpads, Maintain skin hydration (lotion/cream), Minimize layers    Activity Interventions: PT/OT evaluation, Increase time out of bed    Mobility Interventions: Assess need for specialty bed, PT/OT evaluation    Nutrition Interventions: Offer support with meals,snacks and hydration    Friction and Shear Interventions: Lift team/patient mobility team, Minimize layers                Problem: Patient Education: Go to Patient Education Activity  Goal: Patient/Family Education  Outcome: Progressing Towards Goal     Problem: Impaired Skin Integrity/Pressure Injury Treatment  Goal: *Improvement of Existing Pressure Injury  Outcome: Progressing Towards Goal  Goal: *Prevention of pressure injury  Description: Document Stephen Scale and appropriate interventions in the flowsheet.   Outcome: Progressing Towards Goal  Note: Pressure Injury Interventions:  Sensory Interventions: Assess changes in LOC, Keep linens dry and wrinkle-free, Maintain/enhance activity level, Minimize linen layers    Moisture Interventions: Absorbent underpads, Maintain skin hydration (lotion/cream), Minimize layers    Activity Interventions: PT/OT evaluation, Increase time out of bed    Mobility Interventions: Assess need for specialty bed, PT/OT evaluation    Nutrition Interventions: Offer support with meals,snacks and hydration    Friction and Shear Interventions: Lift team/patient mobility team, Minimize layers                Problem: Patient Education: Go to Patient Education Activity  Goal: Patient/Family Education  Outcome: Progressing Towards Goal     Problem: Patient Education: Go to Patient Education Activity  Goal: Patient/Family Education  Outcome: Progressing Towards Goal

## 2021-03-31 NOTE — PROGRESS NOTES
Student came to notify me that a patients echeverria catheter was leaking because the bed was covered in urine. Student and classmate cleaned up the patient and went to check catheter out. Pt has hypospadias, the glans appeared red and irritated. Catheter was not properly secured in the stat lock device so it was moving and pulling. Secured device, and checked the balloon amount, was at 7mL, added 3mL to the correct measurement. Told the students to notify me if it started to leak again.

## 2021-03-31 NOTE — PROGRESS NOTES
Comprehensive Nutrition Assessment    Type and Reason for Visit: Reassess(interim)    Nutrition Recommendations/Plan:   Continue PO diet of puree/NTL, advance per SLP     Consult GI for possible PEG  Needs good oral care, consider lidocaine vs magic mouthwash to assist      As able, replace NGT and initiate TF of Jevity 1.5 at goal of 55ml/hr   Flush with 210ml q4hrs          Provides 1980kcals, 84g pro, 2263ml fluid (Meets 100% EENs, 133% protein, and 100% fluid needs)  *D5 (125ml/hr) providing 510kcals/d    Please document initiation of TF, TF rate, flushes, GRV, and BMs  Update measured body weight    Nutrition Assessment:  Admitted for dehydration and poor PO, +JAE, likely UTI. RD consulted for poor PO, pt initially inappropriate for interview. D/t poor PO pta and since admit, as well as SLP rec for NPO with alternate source nutrition, RD rec'd NGT placement, completed (3/28). (3/29) SLP attempted re-eval however pt refusing PO and oral care. TF running x2 days, poor documentation however likely at goal x1 day prior to removal d/t epistaxis. RD able to see pt today, able to endorse pain however unable to give exact locations. Rec'd replace NGT vs place PEG, needs oral care which may help increase PO intakes-- discussed with RN and MD. Kiara Valverde on D5 providing 510kcals/d. Labs: H/H 8.4/28.6, , BUN 28, Corrected Ca 9.8, AST 85, Alks Phos 123, Phos 1.4. Meds: D5 +NS at 125ml/hr, Amiodarone, statin, ceftriaxone, heparin, PRN antiemetics, PRN miralax.       Malnutrition Assessment:  Malnutrition Status:  Severe malnutrition    Context:  Acute illness     Findings of the 6 clinical characteristics of malnutrition:   Energy Intake:  7 - 50% or less of est energy requirements for 5 or more days  Weight Loss:  Unable to assess     Body Fat Loss:  No significant body fat loss,     Muscle Mass Loss:  7 - Moderate muscle mass loss, Temples (temporalis)  Fluid Accumulation:  No significant fluid accumulation, Estimated Daily Nutrient Needs:  Energy (kcal): 2079kcals (30kcals/kg); Weight Used for Energy Requirements: Current  Protein (g): 83g (1.2g/kg); Weight Used for Protein Requirements: Current  Fluid (ml/day): 2079ml; Method Used for Fluid Requirements: 1 ml/kcal    Nutrition Related Findings:  NFPE finding severe temporal wasting, moderate muscle wasting otherwise. Per hard chart pt on puree/NTL pta, SLP following inpatient and rec'd NPO. No N/V/D/C, last BM 3/31, large and formed. +1-2 genital edema.      Wounds:   Skin tear- Distal penis; DTI-R hip; MASD- Gluteal fold  Deep tissue injury(DTI R hip; L Knee Erythema)       Current Nutrition Therapies:  DIET TUBE FEEDING Jevity 1.5 20ml/hr, advance by 10ml q8hr to goal of 55ml/hr; Flush with 210ml q4hrs  DIET DYSPHAGIA PUREED (NDD1)  2 [place]/2 Mildly Thick  Current Tube Feeding (TF) Orders:   · Goal TF & Flush Orders Provides: Rec'd NGT placement and initiate TF of Jevity 1.5 at goal of 55ml/hr, Flush with 210ml q4hrs; Provides 1980kcals, 84g pro, 2263ml fluid      Anthropometric Measures:  · Height:  6' 2.02\" (188 cm)  · Current Body Wt:  69.3 kg (152 lb 12.5 oz)(3/31)    · Admission Body Wt:  125 lb(3/26)    · Usual Body Wt:  (angle)     · Ideal Body Wt:  190 lbs:  80.4 %   · BMI Category:  Underweight (BMI less than 18.5)       Nutrition Diagnosis:   · Severe malnutrition related to inadequate protein-energy intake as evidenced by poor intake prior to admission, lab values(indicative of dehydration)    Nutrition Interventions:   Food and/or Nutrient Delivery: Continue current diet, Start tube feeding  Nutrition Education and Counseling: No recommendations at this time, Education not appropriate  Coordination of Nutrition Care: Continue to monitor while inpatient, Feeding assistance/environmental change, Speech therapy    Goals:  intakes >/=50% of EENs in 5 days, wt maintenance while acutely ill within +/-0.5kg in 5 days, resume(?) ability to consume PO in7 days Nutrition Monitoring and Evaluation:   Behavioral-Environmental Outcomes: None identified  Food/Nutrient Intake Outcomes: Diet advancement/tolerance, Food and nutrient intake, Enteral nutrition intake/tolerance  Physical Signs/Symptoms Outcomes: Biochemical data, Chewing or swallowing, Fluid status or edema, Meal time behavior, Nutrition focused physical findings, Skin, Weight    Discharge Planning:     Too soon to determine     Electronically signed by Tyler Zhang RD on 3/31/2021 at 10:53 AM    Contact: Ext [phone], or via [employer]

## 2021-03-31 NOTE — CONSULTS
NEURO CONSULT      REASON FOR ADMISSION:  Failure to thrive  Decreased p.o. intake  Refusal to take medications  Confusion      HISTORY:  Mr. 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 fib, MRSA, major depression, acute emboli and thrombosis, benign prostatic hypertrophy, previous stroke, who is consulted to neurology for failure to thrive decreased p.o. intake and refusal to take medications. Patient is currently not cooperating with exam.  He is nonresponsive to verbal stimulus.           ROS: Unreliable    General:                     No fever, no chills, no sweats, no generalized weakness, no weight loss/gain,                                       No loss of appetite   Eyes:                           No blurred vision, no eye pain, no loss of vision, no double vision  ENT:                            rhinorrhea, no pharyngitis   Respiratory:               No cough, no sputum production, no SOB, no OMALLEY, no wheezing, no pleuritic pain   Cardiology:                No chest pain, no palpitations, no orthopnea, no PND, no edema, no syncope   Gastrointestinal:       No abdominal pain , no N/V, no diarrhea, no dysphagia, no constipation, no bleeding   Genitourinary:           frequency, no urgency, no dysuria, no hematuria, no incontinence   Muskuloskeletal :      No arthralgia, no myalgia, no back pain  Hematology:              No easy bruising, no nose or gum bleeding, no lymphadenopathy   Dermatological:         No rash, no ulceration, no pruritis, no color change / jaundice  Endocrine:                 hot flashes or polydipsia   Neurological:             No headache, no dizziness, no confusion, no focal weakness, no paresthesia,                                      No Speech difficulties, no memory loss, no gait difficulty  Psychological:          No neelings of anxiety, no depression, no agitation      NEURO EXAM:    Mental status: Oppositional, not cooperative with exam.  Meg London once    Cranial nerves: Patient resists cranial nerve exam.  There is no central or peripheral segment palsy at this time    Motor exam: Diffusely weak    Sensory exam: Meg London in reaction to pain    Coordination: Does not follow commands    Gait and Station: Was not ambulated    ASSESSMENT:  Noncooperative with exam, oppositional behavior  Failure to thrive  Refusal to take meds    PLAN:  Psychiatry consult  TSH  Prolactin           ALLERGIES:    Allergies   Allergen Reactions    Flonase [Fluticasone] Unknown (comments)       MEDS:      Current Facility-Administered Medications:     dextrose 5% 1,000 mL with sodium chloride 4 MEQ/ML (23.4%) 34.2 mEq infusion, , IntraVENous, CONTINUOUS, Ray Simmons MD, Last Rate: 125 mL/hr at 03/31/21 1127, New Bag at 03/31/21 1127    amiodarone (CORDARONE) tablet 400 mg, 400 mg, Oral, BID, Florance Severance, MD, 400 mg at 03/30/21 1731    lactated Ringers infusion 1,000 mL, 1,000 mL, IntraVENous, CONTINUOUS, Josep Moon MD, Stopped at 03/27/21 0536    lactated Ringers infusion, 125 mL/hr, IntraVENous, CONTINUOUS, Carlene Moon MD, Last Rate: 125 mL/hr at 03/27/21 0346, 125 mL/hr at 03/27/21 0346    lactated Ringers infusion 1,000 mL, 1,000 mL, IntraVENous, CONTINUOUS, Josep Moon MD, Stopped at 03/27/21 0535    acetaminophen (TYLENOL) tablet 650 mg, 650 mg, Oral, Q6H PRN, 650 mg at 03/31/21 0308 **OR** acetaminophen (TYLENOL) suppository 650 mg, 650 mg, Rectal, Q6H PRN, Josep Moon MD    polyethylene glycol (MIRALAX) packet 17 g, 17 g, Oral, DAILY PRN, Josep Moon MD    ondansetron (ZOFRAN ODT) tablet 4 mg, 4 mg, Oral, Q8H PRN **OR** ondansetron (ZOFRAN) injection 4 mg, 4 mg, IntraVENous, Q6H PRN, Carlene Moon MD    heparin 25,000 units in D5W 250 ml infusion, 12-25 Units/kg/hr, IntraVENous, TITRATE, Florance Severance, MD, Stopped at 03/30/21 0037    heparin (porcine) 1,000 unit/mL injection 3,400 Units, 60 Units/kg, IntraVENous, PRN, 30 Units at 03/29/21 1814 **OR** heparin (porcine) 1,000 unit/mL injection 1,700 Units, 30 Units/kg, IntraVENous, PRN, Yasemin Phoenix MD, 1,700 Units at 03/28/21 0406    cefTRIAXone (ROCEPHIN) 1 g in sterile water (preservative free) 10 mL IV syringe, 1 g, IntraVENous, Q24H, Brendan Coreas MD, 1 g at 03/30/21 1404    aspirin tablet 325 mg, 325 mg, Oral, DAILY, Carlene Moon MD, 325 mg at 03/29/21 1019    atorvastatin (LIPITOR) tablet 40 mg, 40 mg, Oral, QHS, Carlene Moon MD, 40 mg at 03/30/21 2250    lactated Ringers infusion 1,000 mL, 1,000 mL, IntraVENous, CONTINUOUS, Carlene Moon MD, 1,000 mL at 03/26/21 2317    LABS:  Recent Results (from the past 24 hour(s))   GLUCOSE, POC    Collection Time: 03/30/21  3:32 PM   Result Value Ref Range    Glucose (POC) 111 (H) 65 - 100 mg/dL    Performed by Ara Bautista    GLUCOSE, POC    Collection Time: 03/30/21  7:33 PM   Result Value Ref Range    Glucose (POC) 84 65 - 100 mg/dL    Performed by Berhane Charles    GLUCOSE, POC    Collection Time: 03/31/21  7:32 AM   Result Value Ref Range    Glucose (POC) 108 (H) 65 - 100 mg/dL    Performed by CRISTAL BATRES    CBC WITH AUTOMATED DIFF    Collection Time: 03/31/21  9:28 AM   Result Value Ref Range    WBC 5.3 4.1 - 11.1 K/uL    RBC 3.07 (L) 4.10 - 5.70 M/uL    HGB 8.5 (L) 12.1 - 17.0 g/dL    HCT 28.5 (L) 36.6 - 50.3 %    MCV 92.8 80.0 - 99.0 FL    MCH 27.7 26.0 - 34.0 PG    MCHC 29.8 (L) 30.0 - 36.5 g/dL    RDW 20.5 (H) 11.5 - 14.5 %    PLATELET 141 (L) 617 - 400 K/uL    MPV 11.9 8.9 - 12.9 FL    NEUTROPHILS 84 (H) 32 - 75 %    LYMPHOCYTES 8 (L) 12 - 49 %    MONOCYTES 5 5 - 13 %    EOSINOPHILS 2 0 - 7 %    BASOPHILS 0 0 - 1 %    IMMATURE GRANULOCYTES 1 (H) 0.0 - 0.5 %    ABS. NEUTROPHILS 4.5 1.8 - 8.0 K/UL    ABS. LYMPHOCYTES 0.4 (L) 0.8 - 3.5 K/UL    ABS. MONOCYTES 0.3 0.0 - 1.0 K/UL    ABS. EOSINOPHILS 0.1 0.0 - 0.4 K/UL    ABS. BASOPHILS 0.0 0.0 - 0.1 K/UL    ABS. IMM. GRANS. 0.1 (H) 0.00 - 0.04 K/UL    DF AUTOMATED     METABOLIC PANEL, COMPREHENSIVE    Collection Time: 03/31/21  9:28 AM   Result Value Ref Range    Sodium 140 136 - 145 mmol/L    Potassium 3.8 3.5 - 5.1 mmol/L    Chloride 112 (H) 97 - 108 mmol/L    CO2 23 21 - 32 mmol/L    Anion gap 5 5 - 15 mmol/L    Glucose 82 65 - 100 mg/dL    BUN 28 (H) 6 - 20 mg/dL    Creatinine 1.23 0.70 - 1.30 mg/dL    BUN/Creatinine ratio 23 (H) 12 - 20      GFR est AA >60 >60 ml/min/1.73m2    GFR est non-AA 57 (L) >60 ml/min/1.73m2    Calcium 8.4 (L) 8.5 - 10.1 mg/dL    Bilirubin, total 0.6 0.2 - 1.0 mg/dL    AST (SGOT) 77 (H) 15 - 37 U/L    ALT (SGPT) 58 12 - 78 U/L    Alk. phosphatase 122 (H) 45 - 117 U/L    Protein, total 5.8 (L) 6.4 - 8.2 g/dL    Albumin 2.0 (L) 3.5 - 5.0 g/dL    Globulin 3.8 2.0 - 4.0 g/dL    A-G Ratio 0.5 (L) 1.1 - 2.2     RENAL FUNCTION PANEL    Collection Time: 03/31/21  9:28 AM   Result Value Ref Range    Sodium 142 136 - 145 mmol/L    Potassium 3.7 3.5 - 5.1 mmol/L    Chloride 112 (H) 97 - 108 mmol/L    CO2 23 21 - 32 mmol/L    Anion gap 7 5 - 15 mmol/L    Glucose 81 65 - 100 mg/dL    BUN 28 (H) 6 - 20 mg/dL    Creatinine 1.22 0.70 - 1.30 mg/dL    BUN/Creatinine ratio 23 (H) 12 - 20      GFR est AA >60 >60 ml/min/1.73m2    GFR est non-AA 58 (L) >60 ml/min/1.73m2    Calcium 8.2 (L) 8.5 - 10.1 mg/dL    Phosphorus 1.4 (L) 2.6 - 4.7 mg/dL    Albumin 2.0 (L) 3.5 - 5.0 g/dL   GLUCOSE, POC    Collection Time: 03/31/21 10:56 AM   Result Value Ref Range    Glucose (POC) 92 65 - 100 mg/dL    Performed by Marco Durbin (SON)        Visit Vitals  /64 (BP 1 Location: Left leg, BP Patient Position: Lying left side)   Pulse 84   Temp 98 °F (36.7 °C)   Resp 13   Ht 6' 2.02\" (1.88 m)   Wt 69.3 kg (152 lb 12.5 oz)   SpO2 92%   BMI 19.61 kg/m²       Imaging:  XR CHEST PORT   Final Result   NG tube position as above. Worsening lung aeration.       CT ABD PELV WO CONT   Final Result   No acute abdominopelvic abnormality. Nonobstructing bilateral renal   calculi. Correlate for constipation given increased stool burden. End-stage   degenerative changes of the hips with postoperative change in the right   acetabulum. Pulmonary interstitial abnormality partially visualized in the acute   setting would suggest infection/inflammation or edema versus a combination of   these and in any case with early airspace component at the right lung base. CT HEAD WO CONT   Final Result   No acute intracranial abnormality. 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 Mild   generalized atrophy with nonspecific white matter abnormality that may indicate   chronic small vessel disease. 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 XR CHEST PORT   Final Result   Rotated exam. Diffuse interstitial abnormality and with question of   some superimposed nodular areas. In the acute setting edema, infection or   combination of these is considered however, especially given the possible   nodular opacities, follow-up to radiographic resolution and/or further   evaluation with chest CT if symptoms/findings do not resolve as expected with   treatment, or if indicated otherwise.

## 2021-03-31 NOTE — PROGRESS NOTES
Renal progress note    Patient: Seema Chao MRN: [id number]  SSN: [id number]    YOB: 1945  Age: 66 y.o. Sex: male      Subjective:   Pt is seen at bedside,, AMS+, no distress  Improving renal functions, creatinine down to 1.22 today  Sodium improved to 140      Past Medical History:   Diagnosis Date    Anemia     BPH (benign prostatic hyperplasia)     CKD (chronic kidney disease)     [facility]re[facility]     D[facility]     DVT (deep venous thrombosis) ([facility])     GERD (gastroesophageal reflux disease)     Glaucoma     Hyperlipemia      No past surgical history on file. No family history on file.   Social History     Tobacco Use    Smoking status: Unknown If Ever Smoked   Substance Use Topics    Alcohol use: Not Currently      Current Facility-Administered Medications   Medication Dose Route Frequency Provider Last Rate Last Admin    dextrose 5% 1,000 mL with sodium chloride 4 MEQ/ML (23.4%) 34.2 mEq infusion   IntraVENous CONTINUOUS Ray Simmons  mL/hr at 03/31/21 1127 New Bag at 03/31/21 1127    amiodarone (CORDARONE) tablet 400 mg  400 mg Oral BID Nohemy Jones MD   400 mg at 03/30/21 1731    lactated Ringers infusion 1,000 mL  1,000 mL IntraVENous CONTINUOUS Alejandra Moon MD   Stopped at 03/27/21 0536    lactated Ringers infusion  125 mL/hr IntraVENous CONTINUOUS Carlene Moon  mL/hr at 03/27/21 0346 125 mL/hr at 03/27/21 0346    lactated Ringers infusion 1,000 mL  1,000 mL IntraVENous CONTINUOUS Alejandra Moon MD   Stopped at 03/27/21 0535    acetaminophen (TYLENOL) tablet 650 mg  650 mg Oral Q6H PRN Carlene Moon MD   650 mg at 03/31/21 0308    Or    acetaminophen (TYLENOL) suppository 650 mg  650 mg Rectal Q6H PRN Alejandra Moon MD        polyethylene glycol (MIRALAX) packet 17 g  17 g Oral DAILY PRN Alejandra Moon MD        ondansetron (ZOFRAN ODT) tablet 4 mg  4 mg Oral Q8H PRN Edgardo Schaefer MD Or    ondansetron (ZOFRAN) injection 4 mg  4 mg IntraVENous Q6H PRN Alejandra Moon MD        heparin 25,000 units in D5W 250 ml infusion  12-25 Units/kg/hr IntraVENous TITRATE Nohemy Jones MD   Stopped at 03/30/21 0037    heparin (porcine) 1,000 unit/mL injection 3,400 Units  60 Units/kg IntraVENous PRN Nohemy Jones MD   30 Units at 03/29/21 1814    Or    heparin (porcine) 1,000 unit/mL injection 1,700 Units  30 Units/kg IntraVENous PRN Nohemy Jones MD   1,700 Units at 03/28/21 0406    cefTRIAXone (ROCEPHIN) 1 g in sterile water (preservative free) 10 mL IV syringe  1 g IntraVENous Q24H Brooklyn Butcher MD   1 g at 03/31/21 1400    aspirin tablet 325 mg  325 mg Oral DAILY Carlene Moon MD   325 mg at 03/29/21 1019    atorvastatin (LIPITOR) tablet 40 mg  40 mg Oral QHS Carlene Moon MD   40 mg at 03/30/21 2250    lactated Ringers infusion 1,000 mL  1,000 mL IntraVENous CONTINUOUS Alejandra Moon MD   1,000 mL at 03/26/21 2317        Allergies   Allergen Reactions    Flonase [Fluticasone] Unknown (comments)         Objective:     Vitals:    03/31/21 0831 03/31/21 1120 03/31/21 1439 03/31/21 1516   BP: (!) 91/55 130/64 114/64    Pulse: 91 84 86    Resp: 18 13 16    Temp: 97.8 °F (36.6 °C) 98 °F (36.7 °C) 97.6 °F (36.4 °C)    SpO2: 97% 92% 98%    Weight: 69.3 kg (152 lb 12.5 oz)      Height:    6' 2.02\" (1.88 m)        Physical Exam:  General: AMS  Eyes: sclera anicteric  Oral Cavity: No thrush or ulcers  Neck: no JVD  Chest: Fair bilateral air entry  Heart: normal sounds  Abdomen: soft and non tender   :  Vazquez+  Lower Extremities: no edema  Skin: no rash  Neuro: ams            Assessment:     Hospital Problems  Never Reviewed          Codes Class Noted POA    Hypovolemia ICD-10-CM: E86.1  ICD-9-CM: 276.52  3/27/2021 Unknown        Acute renal failure ([facility]F) ([facility] ICD-10-CM: N17.9  ICD-9-CM: 584.9  3/27/2021 Unknown        Atrial fibrillation, rapid ([facility] ICD-10-CM: I48.91  ICD-9-CM: 427.31  3/27/2021 Unknown        JAE (acute kidney injury) [facility]) ICD-10-CM: N17.9  ICD-9-CM: 584.9  3/27/2021 Unknown              Plan:     1 acute kidney injury:   -Etiology is likely prerenal azotemia from volume depletion.    -On admission BUN/creatinine 207/8.1, has improved down to 127/4. 22. Improving renal functions with IV hydration, creatinine down to 3.19--2.24--1.22 today  --CT abdomen no evidence of hydronephrosis. Good urine output present  Will decrease IV fluids to 125 mill per hour   continue to monitor renal functions    2. Hypernatremia.    -From free water deficit.    -Sodium 158-->155--157--154--147--140. We will decrease hypotonic IV fluid rate to 75 mL/h  monitor sodium levels    3. Hyperkalemia. Improved potassium levels with medical management  -Continue to monitor potassium levels    4. Altered mental status.    -Could be metabolic encephalopathy/UTI. -CT head no acute process. -Urinalysis consistent with UTI.    -Continue to monitor clinically    Signed By: Orquidea Posada MD     March 31, 2021

## 2021-04-01 ENCOUNTER — APPOINTMENT (OUTPATIENT)
Dept: NON INVASIVE DIAGNOSTICS | Age: 76
DRG: 682 | End: 2021-04-01
Attending: INTERNAL MEDICINE
Payer: MEDICARE

## 2021-04-01 LAB
ALBUMIN SERPL-MCNC: 1.7 G/DL (ref 3.5–5)
ALBUMIN SERPL-MCNC: 1.8 G/DL (ref 3.5–5)
ALBUMIN/GLOB SERPL: 0.5 {RATIO} (ref 1.1–2.2)
ALP SERPL-CCNC: 115 U/L (ref 45–117)
ALT SERPL-CCNC: 55 U/L (ref 12–78)
ANION GAP SERPL CALC-SCNC: 7 MMOL/L (ref 5–15)
ANION GAP SERPL CALC-SCNC: 8 MMOL/L (ref 5–15)
AST SERPL W P-5'-P-CCNC: 66 U/L (ref 15–37)
BASOPHILS # BLD: 0 K/UL (ref 0–0.1)
BASOPHILS NFR BLD: 0 % (ref 0–1)
BILIRUB SERPL-MCNC: 0.5 MG/DL (ref 0.2–1)
BUN SERPL-MCNC: 16 MG/DL (ref 6–20)
BUN SERPL-MCNC: 17 MG/DL (ref 6–20)
BUN/CREAT SERPL: 16 (ref 12–20)
BUN/CREAT SERPL: 17 (ref 12–20)
CA-I BLD-MCNC: 7.8 MG/DL (ref 8.5–10.1)
CA-I BLD-MCNC: 7.9 MG/DL (ref 8.5–10.1)
CHLORIDE SERPL-SCNC: 112 MMOL/L (ref 97–108)
CHLORIDE SERPL-SCNC: 112 MMOL/L (ref 97–108)
CO2 SERPL-SCNC: 21 MMOL/L (ref 21–32)
CO2 SERPL-SCNC: 22 MMOL/L (ref 21–32)
CREAT SERPL-MCNC: 0.98 MG/DL (ref 0.7–1.3)
CREAT SERPL-MCNC: 0.99 MG/DL (ref 0.7–1.3)
DIFFERENTIAL METHOD BLD: ABNORMAL
EOSINOPHIL # BLD: 0.1 K/UL (ref 0–0.4)
EOSINOPHIL NFR BLD: 3 % (ref 0–7)
ERYTHROCYTE [DISTWIDTH] IN BLOOD BY AUTOMATED COUNT: 20 % (ref 11.5–14.5)
GLOBULIN SER CALC-MCNC: 3.7 G/DL (ref 2–4)
GLUCOSE BLD STRIP.AUTO-MCNC: 113 MG/DL (ref 65–100)
GLUCOSE BLD STRIP.AUTO-MCNC: 87 MG/DL (ref 65–100)
GLUCOSE BLD STRIP.AUTO-MCNC: 98 MG/DL (ref 65–100)
GLUCOSE SERPL-MCNC: 90 MG/DL (ref 65–100)
GLUCOSE SERPL-MCNC: 92 MG/DL (ref 65–100)
HCT VFR BLD AUTO: 27 % (ref 36.6–50.3)
HGB BLD-MCNC: 8 G/DL (ref 12.1–17)
IMM GRANULOCYTES # BLD AUTO: 0.1 K/UL (ref 0–0.04)
IMM GRANULOCYTES NFR BLD AUTO: 1 % (ref 0–0.5)
LYMPHOCYTES # BLD: 0.5 K/UL (ref 0.8–3.5)
LYMPHOCYTES NFR BLD: 9 % (ref 12–49)
MCH RBC QN AUTO: 27.6 PG (ref 26–34)
MCHC RBC AUTO-ENTMCNC: 29.6 G/DL (ref 30–36.5)
MCV RBC AUTO: 93.1 FL (ref 80–99)
MONOCYTES # BLD: 0.3 K/UL (ref 0–1)
MONOCYTES NFR BLD: 5 % (ref 5–13)
NEUTS SEG # BLD: 4.5 K/UL (ref 1.8–8)
NEUTS SEG NFR BLD: 82 % (ref 32–75)
NRBC # BLD: 0 K/UL (ref 0–0.01)
NRBC BLD-RTO: 0 PER 100 WBC
PERFORMED BY, TECHID: ABNORMAL
PERFORMED BY, TECHID: NORMAL
PERFORMED BY, TECHID: NORMAL
PHOSPHATE SERPL-MCNC: 1.4 MG/DL (ref 2.6–4.7)
PLATELET # BLD AUTO: 121 K/UL (ref 150–400)
PMV BLD AUTO: 11.5 FL (ref 8.9–12.9)
POTASSIUM SERPL-SCNC: 3.3 MMOL/L (ref 3.5–5.1)
POTASSIUM SERPL-SCNC: 3.3 MMOL/L (ref 3.5–5.1)
PROT SERPL-MCNC: 5.5 G/DL (ref 6.4–8.2)
RBC # BLD AUTO: 2.9 M/UL (ref 4.1–5.7)
SODIUM SERPL-SCNC: 141 MMOL/L (ref 136–145)
SODIUM SERPL-SCNC: 141 MMOL/L (ref 136–145)
WBC # BLD AUTO: 5.4 K/UL (ref 4.1–11.1)

## 2021-04-01 PROCEDURE — 80069 RENAL FUNCTION PANEL: CPT

## 2021-04-01 PROCEDURE — 82607 VITAMIN B-12: CPT

## 2021-04-01 PROCEDURE — 74011250636 HC RX REV CODE- 250/636: Performed by: FAMILY MEDICINE

## 2021-04-01 PROCEDURE — 85025 COMPLETE CBC W/AUTO DIFF WBC: CPT

## 2021-04-01 PROCEDURE — 80053 COMPREHEN METABOLIC PANEL: CPT

## 2021-04-01 PROCEDURE — 82962 GLUCOSE BLOOD TEST: CPT

## 2021-04-01 PROCEDURE — 65270000029 HC RM PRIVATE

## 2021-04-01 PROCEDURE — 74011250636 HC RX REV CODE- 250/636: Performed by: INTERNAL MEDICINE

## 2021-04-01 PROCEDURE — 74011000250 HC RX REV CODE- 250: Performed by: INTERNAL MEDICINE

## 2021-04-01 PROCEDURE — 36415 COLL VENOUS BLD VENIPUNCTURE: CPT

## 2021-04-01 RX ORDER — MIRTAZAPINE 15 MG/1
15 TABLET, ORALLY DISINTEGRATING ORAL
Status: DISCONTINUED | OUTPATIENT
Start: 2021-04-01 | End: 2021-04-08 | Stop reason: HOSPADM

## 2021-04-01 RX ORDER — POTASSIUM CHLORIDE 750 MG/1
40 TABLET, FILM COATED, EXTENDED RELEASE ORAL
Status: DISPENSED | OUTPATIENT
Start: 2021-04-01 | End: 2021-04-02

## 2021-04-01 RX ADMIN — CEFTRIAXONE SODIUM 1 G: 1 INJECTION, POWDER, FOR SOLUTION INTRAMUSCULAR; INTRAVENOUS at 14:59

## 2021-04-01 RX ADMIN — SODIUM CHLORIDE: 234 INJECTION, SOLUTION, CONCENTRATE INTRAVENOUS at 12:48

## 2021-04-01 RX ADMIN — METHYLPREDNISOLONE SODIUM SUCCINATE 40 MG: 40 INJECTION, POWDER, FOR SOLUTION INTRAMUSCULAR; INTRAVENOUS at 17:33

## 2021-04-01 NOTE — PROGRESS NOTES
General Daily Progress Note          Patient Name:   Fang Escobedo       YOB: 1945       Age:  66 y.o. Admit Date: 3/26/2021      Subjective:     75yom with a history of dementia, stroke, COVID, DVT, CKD presented with lethargy and weakness and was diagnosed with JAE and hyperkalemia. Niece was contacted and agreed to [facility] long term care as discharge plan. Speech recommends puree/nectar diet with 1:1 assistance for all PO intake. Today, patient seen resting in bed and nonconversant (baseline). Hgb 8.0, Hct  27. Will get GI consult for PEG tube, patient not eating. Of note, nurses found right sided pressure ulcer, will ensure repositioning every 2 hours. Potassium 3.3 today. /58  P 80  T 97.8 F  R 18          Objective:     Visit Vitals  BP (!) 115/58 (BP 1 Location: Right upper arm, BP Patient Position: At rest;Supine)   Pulse 80   Temp 97.8 °F (36.6 °C)   Resp 18   Ht 6' 2.02\" (1.88 m)   Wt 152 lb 12.5 oz (69.3 kg)   SpO2 99%   BMI 19.61 kg/m²        Recent Results (from the past 24 hour(s))   GLUCOSE, POC    Collection Time: 03/31/21  4:01 PM   Result Value Ref Range    Glucose (POC) 115 (H) 65 - 100 mg/dL    Performed by Debbie Bose    CBC WITH AUTOMATED DIFF    Collection Time: 04/01/21  5:40 AM   Result Value Ref Range    WBC 5.4 4.1 - 11.1 K/uL    RBC 2.90 (L) 4.10 - 5.70 M/uL    HGB 8.0 (L) 12.1 - 17.0 g/dL    HCT 27.0 (L) 36.6 - 50.3 %    MCV 93.1 80.0 - 99.0 FL    MCH 27.6 26.0 - 34.0 PG    MCHC 29.6 (L) 30.0 - 36.5 g/dL    RDW 20.0 (H) 11.5 - 14.5 %    PLATELET 109 (L) 454 - 400 K/uL    MPV 11.5 8.9 - 12.9 FL    NRBC 0.0 0  WBC    ABSOLUTE NRBC 0.00 0.00 - 0.01 K/uL    NEUTROPHILS 82 (H) 32 - 75 %    LYMPHOCYTES 9 (L) 12 - 49 %    MONOCYTES 5 5 - 13 %    EOSINOPHILS 3 0 - 7 %    BASOPHILS 0 0 - 1 %    IMMATURE GRANULOCYTES 1 (H) 0.0 - 0.5 %    ABS. NEUTROPHILS 4.5 1.8 - 8.0 K/UL    ABS. LYMPHOCYTES 0.5 (L) 0.8 - 3.5 K/UL    ABS.  MONOCYTES 0.3 0.0 - 1.0 K/UL    ABS. EOSINOPHILS 0.1 0.0 - 0.4 K/UL    ABS. BASOPHILS 0.0 0.0 - 0.1 K/UL    ABS. IMM. GRANS. 0.1 (H) 0.00 - 0.04 K/UL    DF AUTOMATED     METABOLIC PANEL, COMPREHENSIVE    Collection Time: 04/01/21  5:40 AM   Result Value Ref Range    Sodium 141 136 - 145 mmol/L    Potassium 3.3 (L) 3.5 - 5.1 mmol/L    Chloride 112 (H) 97 - 108 mmol/L    CO2 22 21 - 32 mmol/L    Anion gap 7 5 - 15 mmol/L    Glucose 92 65 - 100 mg/dL    BUN 16 6 - 20 mg/dL    Creatinine 0.99 0.70 - 1.30 mg/dL    BUN/Creatinine ratio 16 12 - 20      GFR est AA >60 >60 ml/min/1.73m2    GFR est non-AA >60 >60 ml/min/1.73m2    Calcium 7.8 (L) 8.5 - 10.1 mg/dL    Bilirubin, total 0.5 0.2 - 1.0 mg/dL    AST (SGOT) 66 (H) 15 - 37 U/L    ALT (SGPT) 55 12 - 78 U/L    Alk. phosphatase 115 45 - 117 U/L    Protein, total 5.5 (L) 6.4 - 8.2 g/dL    Albumin 1.8 (L) 3.5 - 5.0 g/dL    Globulin 3.7 2.0 - 4.0 g/dL    A-G Ratio 0.5 (L) 1.1 - 2.2     RENAL FUNCTION PANEL    Collection Time: 04/01/21  5:40 AM   Result Value Ref Range    Sodium 141 136 - 145 mmol/L    Potassium 3.3 (L) 3.5 - 5.1 mmol/L    Chloride 112 (H) 97 - 108 mmol/L    CO2 21 21 - 32 mmol/L    Anion gap 8 5 - 15 mmol/L    Glucose 90 65 - 100 mg/dL    BUN 17 6 - 20 mg/dL    Creatinine 0.98 0.70 - 1.30 mg/dL    BUN/Creatinine ratio 17 12 - 20      GFR est AA >60 >60 ml/min/1.73m2    GFR est non-AA >60 >60 ml/min/1.73m2    Calcium 7.9 (L) 8.5 - 10.1 mg/dL    Phosphorus 1.4 (L) 2.6 - 4.7 mg/dL    Albumin 1.7 (L) 3.5 - 5.0 g/dL   GLUCOSE, POC    Collection Time: 04/01/21  7:49 AM   Result Value Ref Range    Glucose (POC) 113 (H) 65 - 100 mg/dL    Performed by Aubree Lee            Review of Systems    Unable to obtain      Physical Exam:      Constitutional: Awake, nonverbal mumbles  Head: Normocephalic and atraumatic. Dried blood in mouth and around nares. Eyes: Pupils are equal, round, and reactive to light.  EOM are normal.   Cardiovascular: Normal rate, regular rhythm and normal heart sounds. Pulmonary/Chest: Breath sounds normal. No wheezes. No rales. Exhibits no tenderness. Abdominal: Soft. Bowel sounds are normal. There is no abdominal tenderness. There is no rebound and no guarding. Musculoskeletal: Normal range of motion. Neurological: Contracted leg, unintelligible speech, functional quadriplegic  Skin: wounds on right hip, left medial knee, gluteal cleft     XR CHEST PORT   Final Result   NG tube position as above. Worsening lung aeration. CT ABD PELV WO CONT   Final Result   No acute abdominopelvic abnormality. Nonobstructing bilateral renal   calculi. Correlate for constipation given increased stool burden. End-stage   degenerative changes of the hips with postoperative change in the right   acetabulum. Pulmonary interstitial abnormality partially visualized in the acute   setting would suggest infection/inflammation or edema versus a combination of   these and in any case with early airspace component at the right lung base. CT HEAD WO CONT   Final Result   No acute intracranial abnormality. 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 Mild   generalized atrophy with nonspecific white matter abnormality that may indicate   chronic small vessel disease. 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 XR CHEST PORT   Final Result   Rotated exam. Diffuse interstitial abnormality and with question of   some superimposed nodular areas. In the acute setting edema, infection or   combination of these is considered however, especially given the possible   nodular opacities, follow-up to radiographic resolution and/or further   evaluation with chest CT if symptoms/findings do not resolve as expected with   treatment, or if indicated otherwise.                 Recent Results (from the past 24 hour(s))   GLUCOSE, POC    Collection Time: 03/31/21  4:01 PM   Result Value Ref Range    Glucose (POC) 115 (H) 65 - 100 mg/dL    Performed by Olesya Clayton    CBC WITH AUTOMATED DIFF    Collection Time: 04/01/21  5:40 AM   Result Value Ref Range    WBC 5.4 4.1 - 11.1 K/uL    RBC 2.90 (L) 4.10 - 5.70 M/uL    HGB 8.0 (L) 12.1 - 17.0 g/dL    HCT 27.0 (L) 36.6 - 50.3 %    MCV 93.1 80.0 - 99.0 FL    MCH 27.6 26.0 - 34.0 PG    MCHC 29.6 (L) 30.0 - 36.5 g/dL    RDW 20.0 (H) 11.5 - 14.5 %    PLATELET 405 (L) 424 - 400 K/uL    MPV 11.5 8.9 - 12.9 FL    NRBC 0.0 0  WBC    ABSOLUTE NRBC 0.00 0.00 - 0.01 K/uL    NEUTROPHILS 82 (H) 32 - 75 %    LYMPHOCYTES 9 (L) 12 - 49 %    MONOCYTES 5 5 - 13 %    EOSINOPHILS 3 0 - 7 %    BASOPHILS 0 0 - 1 %    IMMATURE GRANULOCYTES 1 (H) 0.0 - 0.5 %    ABS. NEUTROPHILS 4.5 1.8 - 8.0 K/UL    ABS. LYMPHOCYTES 0.5 (L) 0.8 - 3.5 K/UL    ABS. MONOCYTES 0.3 0.0 - 1.0 K/UL    ABS. EOSINOPHILS 0.1 0.0 - 0.4 K/UL    ABS. BASOPHILS 0.0 0.0 - 0.1 K/UL    ABS. IMM. GRANS. 0.1 (H) 0.00 - 0.04 K/UL    DF AUTOMATED     METABOLIC PANEL, COMPREHENSIVE    Collection Time: 04/01/21  5:40 AM   Result Value Ref Range    Sodium 141 136 - 145 mmol/L    Potassium 3.3 (L) 3.5 - 5.1 mmol/L    Chloride 112 (H) 97 - 108 mmol/L    CO2 22 21 - 32 mmol/L    Anion gap 7 5 - 15 mmol/L    Glucose 92 65 - 100 mg/dL    BUN 16 6 - 20 mg/dL    Creatinine 0.99 0.70 - 1.30 mg/dL    BUN/Creatinine ratio 16 12 - 20      GFR est AA >60 >60 ml/min/1.73m2    GFR est non-AA >60 >60 ml/min/1.73m2    Calcium 7.8 (L) 8.5 - 10.1 mg/dL    Bilirubin, total 0.5 0.2 - 1.0 mg/dL    AST (SGOT) 66 (H) 15 - 37 U/L    ALT (SGPT) 55 12 - 78 U/L    Alk.  phosphatase 115 45 - 117 U/L    Protein, total 5.5 (L) 6.4 - 8.2 g/dL    Albumin 1.8 (L) 3.5 - 5.0 g/dL    Globulin 3.7 2.0 - 4.0 g/dL    A-G Ratio 0.5 (L) 1.1 - 2.2     RENAL FUNCTION PANEL    Collection Time: 04/01/21  5:40 AM   Result Value Ref Range    Sodium 141 136 - 145 mmol/L    Potassium 3.3 (L) 3.5 - 5.1 mmol/L    Chloride 112 (H) 97 - 108 mmol/L    CO2 21 21 - 32 mmol/L    Anion gap 8 5 - 15 mmol/L    Glucose 90 65 - 100 mg/dL    BUN 17 6 - 20 mg/dL    Creatinine 0.98 0.70 - 1.30 mg/dL    BUN/Creatinine ratio 17 12 - 20      GFR est AA >60 >60 ml/min/1.73m2    GFR est non-AA >60 >60 ml/min/1.73m2    Calcium 7.9 (L) 8.5 - 10.1 mg/dL    Phosphorus 1.4 (L) 2.6 - 4.7 mg/dL    Albumin 1.7 (L) 3.5 - 5.0 g/dL   GLUCOSE, POC    Collection Time: 04/01/21  7:49 AM   Result Value Ref Range    Glucose (POC) 113 (H) 65 - 100 mg/dL    Performed by Desiree Thapa        Results     Procedure Component Value Units Date/Time    CULTURE, URINE [[id number]] Collected: 03/27/21 1700    Order Status: Completed Specimen: Urine Updated: 03/29/21 1114     Special Requests: No Special Requests        Culture result: No Growth (<1000 cfu/mL)       COVID-19 RAPID TEST [[id number]] Collected: 03/27/21 0730    Order Status: Completed Specimen: Nasopharyngeal Updated: 03/27/21 0813     Specimen source Nasopharyngeal        COVID-19 rapid test Not Detected        Comment: Rapid Abbott ID Now   Rapid NAAT:  The specimen is NEGATIVE for SARS-CoV-2, the novel coronavirus associated with COVID-19. Negative results should be treated as presumptive and, if inconsistent with clinical signs and symptoms or necessary for patient management, should be tested with an alternative molecular assay. Negative results do not preclude SARS-CoV-2 infection and should not be used as the sole basis for patient management decisions. This test has been authorized by the FDA under   an Emergency Use Authorization (EUA) for use by authorized laboratories.  Fact sheet for Healthcare Providers: ConventionUpdate.co.nz Fact sheet for Patients: ConventionUpdate.co.nz   Methodology: Isothermal Nucleic Acid Amplification         CULTURE, BLOOD, PAIRED [[id number]] Collected: 03/26/21 2045    Order Status: Completed Specimen: Blood Updated: 04/01/21 0740     Special Requests: No Special Requests        Culture result: No growth 5 days              Labs:     Recent Labs     04/01/21  0540 03/31/21  0928   WBC 5.4 5.3   HGB 8.0* 8.5*   HCT 27.0* 28.5*   * 121*     Recent Labs     04/01/21  0540 03/31/21  0928 03/30/21  0030     141 140  142 147*  147*   K 3.3*  3.3* 3.8  3.7 3.7  3.7   *  112* 112*  112* 117*  117*   CO2 21  22 23  23 25  25   BUN 17  16 28*  28* 63*  65*   CREA 0.98  0.99 1.23  1.22 2.24*  2.25*   GLU 90  92 82  81 128*  131*   CA 7.9*  7.8* 8.4*  8.2* 8.0*  8.4*   MG  --   --  2.4   PHOS 1.4* 1.4* 1.8*     Recent Labs     04/01/21 0540 03/31/21  0928 03/30/21  0030   ALT 55 58 54    122* 123*   TBILI 0.5 0.6 0.3   TP 5.5* 5.8* 5.6*   ALB 1.7*  1.8* 2.0*  2.0* 2.1*  2.1*   GLOB 3.7 3.8 3.5     Recent Labs     03/29/21  1657   APTT 78.8*      No results for input(s): FE, TIBC, PSAT, FERR in the last 72 hours. No results found for: FOL, RBCF   No results for input(s): PH, PCO2, PO2 in the last 72 hours. No results for input(s): CPK, CKNDX, TROIQ in the last 72 hours.     No lab exists for component: CPKMB  No results found for: CHOL, CHOLX, CHLST, CHOLV, HDL, HDLP, LDL, LDLC, DLDLP, TGLX, TRIGL, TRIGP, CHHD, CHHDX  Lab Results   Component Value Date/Time    Glucose (POC) 113 (H) 04/01/2021 07:49 AM    Glucose (POC) 115 (H) 03/31/2021 04:01 PM    Glucose (POC) 92 03/31/2021 10:56 AM    Glucose (POC) 108 (H) 03/31/2021 07:32 AM    Glucose (POC) 84 03/30/2021 07:33 PM     Lab Results   Component Value Date/Time    Color Yellow 03/27/2021 01:55 AM    Appearance Turbid (A) 03/27/2021 01:55 AM    Specific gravity 1.019 03/27/2021 01:55 AM    pH (UA) 5.0 03/27/2021 01:55 AM    Protein 100 (A) 03/27/2021 01:55 AM    Glucose Negative 03/27/2021 01:55 AM    Ketone Negative 03/27/2021 01:55 AM    Bilirubin Negative 03/27/2021 01:55 AM    Urobilinogen 0.1 03/27/2021 01:55 AM    Nitrites Negative 03/27/2021 01:55 AM    Leukocyte Esterase Large (A) 03/27/2021 01:55 AM Bacteria Negative 03/27/2021 01:55 AM    Bacteria Negative 03/27/2021 01:55 AM    WBC 20-50 03/27/2021 01:55 AM    WBC 20-50 03/27/2021 01:55 AM    RBC 20-50 03/27/2021 01:55 AM    RBC 20-50 03/27/2021 01:55 AM         Assessment:        A. fib with rapid ventricular rate  Improving with HR in 80s, up to 110 with moving  Heparin discontinued due to bleeding    Acute kidney injury  Improved  Cr 0.98 (8.1 on admission)  BUN 17 (207 on admission)    Acute Blood Loss Anemia secondary to bleeding from nose, mouth  Hgb 8.0, Hct  27  Heparin drip stopped  NG tube removed: on nectar puree  Monitor H&H    Hypokalemia  K 3.3  Monitor    Hypernatremia  141  Corrected    UTI/negative culture    History of recent Covid    History of obstructive uropathy    Dementia    Functional quadriplegic    History of CVA    History of DVT    Hyperlipidemia          Plan:     Continue Aspirin, 325 mg, PO QD   Continue Lipitor , 40 mg, PO QD  Continue Ceftriaxone, 1g, IV QD  Continue amiodarone, 400 mg, PO BID  Continue to reposition Q2H     STOP heparin drip    Continue with Nephrology and cardiology consult    Monitor kidney function, electrolyte imbalances  Monitor H&H    GI consult for PEG tube placement        Current Facility-Administered Medications:     dextrose 5% 1,000 mL with sodium chloride 4 MEQ/ML (23.4%) 34.2 mEq infusion, , IntraVENous, CONTINUOUS, Ray Simmons MD, Last Rate: 75 mL/hr at 03/31/21 2220, Rate Change at 03/31/21 2220    amiodarone (CORDARONE) tablet 400 mg, 400 mg, Oral, BID, Florian FREY MD, 400 mg at 03/30/21 1731    lactated Ringers infusion 1,000 mL, 1,000 mL, IntraVENous, CONTINUOUS, Clint Moon MD, Stopped at 03/27/21 0536    lactated Ringers infusion, 125 mL/hr, IntraVENous, CONTINUOUS, Carlene Moon MD, Last Rate: 125 mL/hr at 03/27/21 0346, 125 mL/hr at 03/27/21 0346    lactated Ringers infusion 1,000 mL, 1,000 mL, IntraVENous, CONTINUOUS, Clint Moon MD, Stopped at 03/27/21 1664    acetaminophen (TYLENOL) tablet 650 mg, 650 mg, Oral, Q6H PRN, 650 mg at 03/31/21 0308 **OR** acetaminophen (TYLENOL) suppository 650 mg, 650 mg, Rectal, Q6H PRN, Sara Moon MD    polyethylene glycol (MIRALAX) packet 17 g, 17 g, Oral, DAILY PRN, Sara Moon MD    ondansetron (ZOFRAN ODT) tablet 4 mg, 4 mg, Oral, Q8H PRN **OR** ondansetron (ZOFRAN) injection 4 mg, 4 mg, IntraVENous, Q6H PRN, Carlene Moon MD    heparin 25,000 units in D5W 250 ml infusion, 12-25 Units/kg/hr, IntraVENous, TITRATE, Brynn Dixon MD, Stopped at 03/30/21 0037    heparin (porcine) 1,000 unit/mL injection 3,400 Units, 60 Units/kg, IntraVENous, PRN, 30 Units at 03/29/21 1814 **OR** heparin (porcine) 1,000 unit/mL injection 1,700 Units, 30 Units/kg, IntraVENous, PRN, Brynn Dixon MD, 1,700 Units at 03/28/21 0406    cefTRIAXone (ROCEPHIN) 1 g in sterile water (preservative free) 10 mL IV syringe, 1 g, IntraVENous, Q24H, Brendan Coreas MD, 1 g at 03/31/21 1400    aspirin tablet 325 mg, 325 mg, Oral, DAILY, Carlene Moon MD, 325 mg at 03/29/21 1019    atorvastatin (LIPITOR) tablet 40 mg, 40 mg, Oral, QHS, Carlene Moon MD, 40 mg at 03/30/21 2250    lactated Ringers infusion 1,000 mL, 1,000 mL, IntraVENous, CONTINUOUS, Carlene Moon MD, 1,000 mL at 03/26/21 7541

## 2021-04-01 NOTE — PROGRESS NOTES
Chart reviewed, DCP remains for patient to d/c to [facility] once medically stable. CM continues to follow.

## 2021-04-01 NOTE — PROGRESS NOTES
General Daily Progress Note          Patient Name:   Daquan Ortiz       YOB: 1945       Age:  66 y.o. Admit Date: 3/26/2021      Subjective:     75yom with a history of dementia, stroke, COVID, DVT, CKD presented with lethargy and weakness and was diagnosed with JAE and hyperkalemia. Niece was contacted and agreed to [facility] long term care as discharge plan. Speech recommends puree/nectar diet with 1:1 assistance for all PO intake. Today, patient seen resting in bed and nonconversant (baseline). Hgb 8.0, Hct  27. Will get GI consult for PEG tube, patient not eating. Of note, nurses found right sided pressure ulcer, will ensure repositioning every 2 hours. Potassium 3.3 today. Patient has rash all over the body    /58  P 80  T 97.8 F  R 18          Objective:     Visit Vitals  BP (!) 91/47 (BP 1 Location: Right upper arm, BP Patient Position: Supine; At rest)   Pulse 81   Temp 98.2 °F (36.8 °C)   Resp 18   Ht 6' 2.02\" (1.88 m)   Wt 69.3 kg (152 lb 12.5 oz)   SpO2 94%   BMI 19.61 kg/m²        Recent Results (from the past 24 hour(s))   CBC WITH AUTOMATED DIFF    Collection Time: 04/01/21  5:40 AM   Result Value Ref Range    WBC 5.4 4.1 - 11.1 K/uL    RBC 2.90 (L) 4.10 - 5.70 M/uL    HGB 8.0 (L) 12.1 - 17.0 g/dL    HCT 27.0 (L) 36.6 - 50.3 %    MCV 93.1 80.0 - 99.0 FL    MCH 27.6 26.0 - 34.0 PG    MCHC 29.6 (L) 30.0 - 36.5 g/dL    RDW 20.0 (H) 11.5 - 14.5 %    PLATELET 465 (L) 409 - 400 K/uL    MPV 11.5 8.9 - 12.9 FL    NRBC 0.0 0  WBC    ABSOLUTE NRBC 0.00 0.00 - 0.01 K/uL    NEUTROPHILS 82 (H) 32 - 75 %    LYMPHOCYTES 9 (L) 12 - 49 %    MONOCYTES 5 5 - 13 %    EOSINOPHILS 3 0 - 7 %    BASOPHILS 0 0 - 1 %    IMMATURE GRANULOCYTES 1 (H) 0.0 - 0.5 %    ABS. NEUTROPHILS 4.5 1.8 - 8.0 K/UL    ABS. LYMPHOCYTES 0.5 (L) 0.8 - 3.5 K/UL    ABS. MONOCYTES 0.3 0.0 - 1.0 K/UL    ABS. EOSINOPHILS 0.1 0.0 - 0.4 K/UL    ABS. BASOPHILS 0.0 0.0 - 0.1 K/UL    ABS. IMM.  GRANS. 0.1 (H) 0.00 - 0.04 K/UL    DF AUTOMATED     METABOLIC PANEL, COMPREHENSIVE    Collection Time: 04/01/21  5:40 AM   Result Value Ref Range    Sodium 141 136 - 145 mmol/L    Potassium 3.3 (L) 3.5 - 5.1 mmol/L    Chloride 112 (H) 97 - 108 mmol/L    CO2 22 21 - 32 mmol/L    Anion gap 7 5 - 15 mmol/L    Glucose 92 65 - 100 mg/dL    BUN 16 6 - 20 mg/dL    Creatinine 0.99 0.70 - 1.30 mg/dL    BUN/Creatinine ratio 16 12 - 20      GFR est AA >60 >60 ml/min/1.73m2    GFR est non-AA >60 >60 ml/min/1.73m2    Calcium 7.8 (L) 8.5 - 10.1 mg/dL    Bilirubin, total 0.5 0.2 - 1.0 mg/dL    AST (SGOT) 66 (H) 15 - 37 U/L    ALT (SGPT) 55 12 - 78 U/L    Alk.  phosphatase 115 45 - 117 U/L    Protein, total 5.5 (L) 6.4 - 8.2 g/dL    Albumin 1.8 (L) 3.5 - 5.0 g/dL    Globulin 3.7 2.0 - 4.0 g/dL    A-G Ratio 0.5 (L) 1.1 - 2.2     RENAL FUNCTION PANEL    Collection Time: 04/01/21  5:40 AM   Result Value Ref Range    Sodium 141 136 - 145 mmol/L    Potassium 3.3 (L) 3.5 - 5.1 mmol/L    Chloride 112 (H) 97 - 108 mmol/L    CO2 21 21 - 32 mmol/L    Anion gap 8 5 - 15 mmol/L    Glucose 90 65 - 100 mg/dL    BUN 17 6 - 20 mg/dL    Creatinine 0.98 0.70 - 1.30 mg/dL    BUN/Creatinine ratio 17 12 - 20      GFR est AA >60 >60 ml/min/1.73m2    GFR est non-AA >60 >60 ml/min/1.73m2    Calcium 7.9 (L) 8.5 - 10.1 mg/dL    Phosphorus 1.4 (L) 2.6 - 4.7 mg/dL    Albumin 1.7 (L) 3.5 - 5.0 g/dL   GLUCOSE, POC    Collection Time: 04/01/21  7:49 AM   Result Value Ref Range    Glucose (POC) 113 (H) 65 - 100 mg/dL    Performed by Alannah Ryan, POC    Collection Time: 04/01/21 11:32 AM   Result Value Ref Range    Glucose (POC) 87 65 - 100 mg/dL    Performed by Susi Julian    GLUCOSE, POC    Collection Time: 04/01/21  4:16 PM   Result Value Ref Range    Glucose (POC) 98 65 - 100 mg/dL    Performed by Susi Julian            Review of Systems    Unable to obtain      Physical Exam:      Constitutional: Awake, nonverbal mumbles  Head: Normocephalic and atraumatic. Dried blood in mouth and around nares. Eyes: Pupils are equal, round, and reactive to light. EOM are normal.   Cardiovascular: Normal rate, regular rhythm and normal heart sounds. Pulmonary/Chest: Breath sounds normal. No wheezes. No rales. Exhibits no tenderness. Abdominal: Soft. Bowel sounds are normal. There is no abdominal tenderness. There is no rebound and no guarding. Musculoskeletal: Normal range of motion. Neurological: Contracted leg, unintelligible speech, functional quadriplegic  Skin: wounds on right hip, left medial knee, gluteal cleft     XR CHEST PORT   Final Result   NG tube position as above. Worsening lung aeration. CT ABD PELV WO CONT   Final Result   No acute abdominopelvic abnormality. Nonobstructing bilateral renal   calculi. Correlate for constipation given increased stool burden. End-stage   degenerative changes of the hips with postoperative change in the right   acetabulum. Pulmonary interstitial abnormality partially visualized in the acute   setting would suggest infection/inflammation or edema versus a combination of   these and in any case with early airspace component at the right lung base. CT HEAD WO CONT   Final Result   No acute intracranial abnormality. 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 Mild   generalized atrophy with nonspecific white matter abnormality that may indicate   chronic small vessel disease. 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 XR CHEST PORT   Final Result   Rotated exam. Diffuse interstitial abnormality and with question of   some superimposed nodular areas.  In the acute setting edema, infection or   combination of these is considered however, especially given the possible   nodular opacities, follow-up to radiographic resolution and/or further   evaluation with chest CT if symptoms/findings do not resolve as expected with   treatment, or if indicated otherwise. Recent Results (from the past 24 hour(s))   CBC WITH AUTOMATED DIFF    Collection Time: 04/01/21  5:40 AM   Result Value Ref Range    WBC 5.4 4.1 - 11.1 K/uL    RBC 2.90 (L) 4.10 - 5.70 M/uL    HGB 8.0 (L) 12.1 - 17.0 g/dL    HCT 27.0 (L) 36.6 - 50.3 %    MCV 93.1 80.0 - 99.0 FL    MCH 27.6 26.0 - 34.0 PG    MCHC 29.6 (L) 30.0 - 36.5 g/dL    RDW 20.0 (H) 11.5 - 14.5 %    PLATELET 902 (L) 964 - 400 K/uL    MPV 11.5 8.9 - 12.9 FL    NRBC 0.0 0  WBC    ABSOLUTE NRBC 0.00 0.00 - 0.01 K/uL    NEUTROPHILS 82 (H) 32 - 75 %    LYMPHOCYTES 9 (L) 12 - 49 %    MONOCYTES 5 5 - 13 %    EOSINOPHILS 3 0 - 7 %    BASOPHILS 0 0 - 1 %    IMMATURE GRANULOCYTES 1 (H) 0.0 - 0.5 %    ABS. NEUTROPHILS 4.5 1.8 - 8.0 K/UL    ABS. LYMPHOCYTES 0.5 (L) 0.8 - 3.5 K/UL    ABS. MONOCYTES 0.3 0.0 - 1.0 K/UL    ABS. EOSINOPHILS 0.1 0.0 - 0.4 K/UL    ABS. BASOPHILS 0.0 0.0 - 0.1 K/UL    ABS. IMM. GRANS. 0.1 (H) 0.00 - 0.04 K/UL    DF AUTOMATED     METABOLIC PANEL, COMPREHENSIVE    Collection Time: 04/01/21  5:40 AM   Result Value Ref Range    Sodium 141 136 - 145 mmol/L    Potassium 3.3 (L) 3.5 - 5.1 mmol/L    Chloride 112 (H) 97 - 108 mmol/L    CO2 22 21 - 32 mmol/L    Anion gap 7 5 - 15 mmol/L    Glucose 92 65 - 100 mg/dL    BUN 16 6 - 20 mg/dL    Creatinine 0.99 0.70 - 1.30 mg/dL    BUN/Creatinine ratio 16 12 - 20      GFR est AA >60 >60 ml/min/1.73m2    GFR est non-AA >60 >60 ml/min/1.73m2    Calcium 7.8 (L) 8.5 - 10.1 mg/dL    Bilirubin, total 0.5 0.2 - 1.0 mg/dL    AST (SGOT) 66 (H) 15 - 37 U/L    ALT (SGPT) 55 12 - 78 U/L    Alk.  phosphatase 115 45 - 117 U/L    Protein, total 5.5 (L) 6.4 - 8.2 g/dL    Albumin 1.8 (L) 3.5 - 5.0 g/dL    Globulin 3.7 2.0 - 4.0 g/dL    A-G Ratio 0.5 (L) 1.1 - 2.2     RENAL FUNCTION PANEL    Collection Time: 04/01/21  5:40 AM   Result Value Ref Range    Sodium 141 136 - 145 mmol/L    Potassium 3.3 (L) 3.5 - 5.1 mmol/L    Chloride 112 (H) 97 - 108 mmol/L    CO2 21 21 - 32 mmol/L    Anion gap 8 5 - 15 mmol/L    Glucose 90 65 - 100 mg/dL    BUN 17 6 - 20 mg/dL    Creatinine 0.98 0.70 - 1.30 mg/dL    BUN/Creatinine ratio 17 12 - 20      GFR est AA >60 >60 ml/min/1.73m2    GFR est non-AA >60 >60 ml/min/1.73m2    Calcium 7.9 (L) 8.5 - 10.1 mg/dL    Phosphorus 1.4 (L) 2.6 - 4.7 mg/dL    Albumin 1.7 (L) 3.5 - 5.0 g/dL   GLUCOSE, POC    Collection Time: 04/01/21  7:49 AM   Result Value Ref Range    Glucose (POC) 113 (H) 65 - 100 mg/dL    Performed by [employer]    GLUCOSE, POC    Collection Time: 04/01/21 11:32 AM   Result Value Ref Range    Glucose (POC) 87 65 - 100 mg/dL    Performed by [employer]    GLUCOSE, POC    Collection Time: 04/01/21  4:16 PM   Result Value Ref Range    Glucose (POC) 98 65 - 100 mg/dL    Performed by [email or web address]        Results     Procedure Component Value Units Date/Time    CULTURE, URINE [[id number]] Collected: 03/27/21 1700    Order Status: Completed Specimen: Urine Updated: 03/29/21 1114     Special Requests: No Special Requests        Culture result: No Growth (<1000 cfu/mL)       COVID-19 RAPID TEST [[id number]] Collected: 03/27/21 0730    Order Status: Completed Specimen: Nasopharyngeal Updated: 03/27/21 0813     Specimen source Nasopharyngeal        COVID-19 rapid test Not Detected        Comment: Rapid Abbott ID Now   Rapid NAAT:  The specimen is NEGATIVE for SARS-CoV-2, the novel coronavirus associated with COVID-19. Negative results should be treated as presumptive and, if inconsistent with clinical signs and symptoms or necessary for patient management, should be tested with an alternative molecular assay. Negative results do not preclude SARS-CoV-2 infection and should not be used as the sole basis for patient management decisions. This test has been authorized by the FDA under   an Emergency Use Authorization (EUA) for use by authorized laboratories.  Fact sheet for Healthcare Providers: ConventionUpdate.co.nz Fact sheet for Patients: ConventionUpdate.co.nz   Methodology: Isothermal Nucleic Acid Amplification         CULTURE, BLOOD, PAIRED [[id number]] Collected: 03/26/21 2045    Order Status: Completed Specimen: Blood Updated: 04/01/21 0740     Special Requests: No Special Requests        Culture result: No growth 5 days              Labs:     Recent Labs     04/01/21 0540 03/31/21 0928   WBC 5.4 5.3   HGB 8.0* 8.5*   HCT 27.0* 28.5*   * 121*     Recent Labs     04/01/21 0540 03/31/21 0928 03/30/21  0030     141 140  142 147*  147*   K 3.3*  3.3* 3.8  3.7 3.7  3.7   *  112* 112*  112* 117*  117*   CO2 21  22 23  23 25  25   BUN 17  16 28*  28* 63*  65*   CREA 0.98  0.99 1.23  1.22 2.24*  2.25*   GLU 90  92 82  81 128*  131*   CA 7.9*  7.8* 8.4*  8.2* 8.0*  8.4*   MG  --   --  2.4   PHOS 1.4* 1.4* 1.8*     Recent Labs     04/01/21 0540 03/31/21 0928 03/30/21  0030   ALT 55 58 54    122* 123*   TBILI 0.5 0.6 0.3   TP 5.5* 5.8* 5.6*   ALB 1.7*  1.8* 2.0*  2.0* 2.1*  2.1*   GLOB 3.7 3.8 3.5     Recent Labs     03/29/21  1657   APTT 78.8*      No results for input(s): FE, TIBC, PSAT, FERR in the last 72 hours. No results found for: FOL, RBCF   No results for input(s): PH, PCO2, PO2 in the last 72 hours. No results for input(s): CPK, CKNDX, TROIQ in the last 72 hours.     No lab exists for component: CPKMB  No results found for: CHOL, CHOLX, CHLST, CHOLV, HDL, HDLP, LDL, LDLC, DLDLP, TGLX, TRIGL, TRIGP, CHHD, CHHDX  Lab Results   Component Value Date/Time    Glucose (POC) 98 04/01/2021 04:16 PM    Glucose (POC) 87 04/01/2021 11:32 AM    Glucose (POC) 113 (H) 04/01/2021 07:49 AM    Glucose (POC) 115 (H) 03/31/2021 04:01 PM    Glucose (POC) 92 03/31/2021 10:56 AM     Lab Results   Component Value Date/Time    Color Yellow 03/27/2021 01:55 AM    Appearance Turbid (A) 03/27/2021 01:55 AM    Specific gravity 1.019 03/27/2021 01:55 AM    pH (UA) 5.0 03/27/2021 01:55 AM    Protein 100 (A) 03/27/2021 01:55 AM    Glucose Negative 03/27/2021 01:55 AM    Ketone Negative 03/27/2021 01:55 AM    Bilirubin Negative 03/27/2021 01:55 AM    Urobilinogen 0.1 03/27/2021 01:55 AM    Nitrites Negative 03/27/2021 01:55 AM    Leukocyte Esterase Large (A) 03/27/2021 01:55 AM    Bacteria Negative 03/27/2021 01:55 AM    Bacteria Negative 03/27/2021 01:55 AM    WBC 20-50 03/27/2021 01:55 AM    WBC 20-50 03/27/2021 01:55 AM    RBC 20-50 03/27/2021 01:55 AM    RBC 20-50 03/27/2021 01:55 AM         Assessment:        A. fib with rapid ventricular rate  Improving with HR in 80s, up to 110 with moving  Heparin discontinued due to bleeding    Acute kidney injury  Improved  Cr 0.98 (8.1 on admission)  BUN 17 (207 on admission)    Acute Blood Loss Anemia secondary to bleeding from nose, mouth  Hgb 8.0, Hct  27  Heparin drip stopped  NG tube removed: on nectar puree  Monitor H&H    Hypokalemia  K 3.3  Monitor    Hypernatremia  141  Corrected    UTI/negative culture    History of recent Covid    History of obstructive uropathy    Dementia    Functional quadriplegic    Dermatitis    History of CVA    History of DVT    Hyperlipidemia          Plan:   Start Solu-Medrol 40 mg IV every 6 hours for the rash  Continue Aspirin, 325 mg, PO QD   Continue Lipitor , 40 mg, PO QD  Continue Ceftriaxone, 1g, IV QD  Continue amiodarone, 400 mg, PO BID  Continue to reposition Q2H     STOP heparin drip    Continue with Nephrology and cardiology consult    Monitor kidney function, electrolyte imbalances  Monitor H&H    GI consult for PEG tube placement        Current Facility-Administered Medications:     mirtazapine (REMERON SOL-TAB) disintegrating tablet 15 mg, 15 mg, Oral, QHS, Tanner Choudhary MD    methylPREDNISolone (PF) (SOLU-MEDROL) injection 40 mg, 40 mg, IntraVENous, Q6H, Carlene Moon MD    dextrose 5% 1,000 mL with sodium chloride 4 MEQ/ML (23.4%) 34.2 mEq infusion, , IntraVENous, CONTINUOUS, Vonda Tapia MD, Last Rate: 75 mL/hr at 04/01/21 1248, New Bag at 04/01/21 1248    amiodarone (CORDARONE) tablet 400 mg, 400 mg, Oral, BID, Kayla Amor MD, 400 mg at 03/30/21 1731    lactated Ringers infusion 1,000 mL, 1,000 mL, IntraVENous, CONTINUOUS, Patrice Moon MD, Stopped at 03/27/21 0536    lactated Ringers infusion, 125 mL/hr, IntraVENous, CONTINUOUS, Carlene Moon MD, Last Rate: 125 mL/hr at 03/27/21 0346, 125 mL/hr at 03/27/21 0346    lactated Ringers infusion 1,000 mL, 1,000 mL, IntraVENous, CONTINUOUS, Patrice Moon MD, Stopped at 03/27/21 0535    acetaminophen (TYLENOL) tablet 650 mg, 650 mg, Oral, Q6H PRN, 650 mg at 03/31/21 0308 **OR** acetaminophen (TYLENOL) suppository 650 mg, 650 mg, Rectal, Q6H PRN, Patrice Moon MD    polyethylene glycol (MIRALAX) packet 17 g, 17 g, Oral, DAILY PRN, Patrice Moon MD    ondansetron (ZOFRAN ODT) tablet 4 mg, 4 mg, Oral, Q8H PRN **OR** ondansetron (ZOFRAN) injection 4 mg, 4 mg, IntraVENous, Q6H PRN, Carlene Moon MD    heparin 25,000 units in D5W 250 ml infusion, 12-25 Units/kg/hr, IntraVENous, TITRATE, Kayla Amor MD, Stopped at 03/30/21 0037    heparin (porcine) 1,000 unit/mL injection 3,400 Units, 60 Units/kg, IntraVENous, PRN, 30 Units at 03/29/21 1814 **OR** heparin (porcine) 1,000 unit/mL injection 1,700 Units, 30 Units/kg, IntraVENous, PRN, Kayla Amor MD, 1,700 Units at 03/28/21 0406    cefTRIAXone (ROCEPHIN) 1 g in sterile water (preservative free) 10 mL IV syringe, 1 g, IntraVENous, Q24H, Brendan Coreas MD, 1 g at 04/01/21 1459    aspirin tablet 325 mg, 325 mg, Oral, DAILY, Carlene Moon MD, 325 mg at 03/29/21 1019    atorvastatin (LIPITOR) tablet 40 mg, 40 mg, Oral, QHS, Carlene Moon MD, 40 mg at 03/30/21 2250    lactated Ringers infusion 1,000 mL, 1,000 mL, IntraVENous, CONTINUOUS, Carlene Moon MD, 1,000 mL at 03/26/21 2044

## 2021-04-01 NOTE — CONSULTS
Gastroenterology Consult     Referring Physician: Trisha Ambriz MD     Consult Date: 4/1/2021     Subjective:     Chief Complaint: Lethargy and weight loss    History of Present Illness: Moose Garcia is a 66 y.o. male who is seen in consultation for PEG tube placement  History obtained form chart. Patient was admitted to the hospital from the nursing facility with complains or poor oral intake. Staff also noted decrease urine output.   -Patient is awake, follows command. Complains of knee pain. He has bilateral  mittens on. He has red, itchy patches all over his back, neck, and groin area. -PMH includes anemia, BPH, stroke, CVA, dementia, HLD. A narrative of his history is as follows     Past Medical History:   Diagnosis Date    Anemia     BPH (benign prostatic hyperplasia)     CKD (chronic kidney disease)     [facility]     Dementia ([facility]     DVT (deep venous thrombosis) ([facility]     GERD (gastroesophageal reflux disease)     Glaucoma     Hyperlipemia      No past surgical history on file. No family history on file.   Social History     Tobacco Use    Smoking status: Unknown If Ever Smoked   Substance Use Topics    Alcohol use: Not Currently      Allergies   Allergen Reactions    Flonase [Fluticasone] Unknown (comments)     Current Facility-Administered Medications   Medication Dose Route Frequency    dextrose 5% 1,000 mL with sodium chloride 4 MEQ/ML (23.4%) 34.2 mEq infusion   IntraVENous CONTINUOUS    amiodarone (CORDARONE) tablet 400 mg  400 mg Oral BID    lactated Ringers infusion 1,000 mL  1,000 mL IntraVENous CONTINUOUS    lactated Ringers infusion  125 mL/hr IntraVENous CONTINUOUS    lactated Ringers infusion 1,000 mL  1,000 mL IntraVENous CONTINUOUS    acetaminophen (TYLENOL) tablet 650 mg  650 mg Oral Q6H PRN    Or    acetaminophen (TYLENOL) suppository 650 mg  650 mg Rectal Q6H PRN    polyethylene glycol (MIRALAX) packet 17 g  17 g Oral DAILY PRN    ondansetron (ZOFRAN ODT) tablet 4 mg  4 mg Oral Q8H PRN    Or    ondansetron (ZOFRAN) injection 4 mg  4 mg IntraVENous Q6H PRN    heparin 25,000 units in D5W 250 ml infusion  12-25 Units/kg/hr IntraVENous TITRATE    heparin (porcine) 1,000 unit/mL injection 3,400 Units  60 Units/kg IntraVENous PRN    Or    heparin (porcine) 1,000 unit/mL injection 1,700 Units  30 Units/kg IntraVENous PRN    cefTRIAXone (ROCEPHIN) 1 g in sterile water (preservative free) 10 mL IV syringe  1 g IntraVENous Q24H    aspirin tablet 325 mg  325 mg Oral DAILY    atorvastatin (LIPITOR) tablet 40 mg  40 mg Oral QHS    lactated Ringers infusion 1,000 mL  1,000 mL IntraVENous CONTINUOUS        Review of Systems:  A detailed 10 organ review of systems is obtained with pertinent positives as listed in the History of Present Illness and Past Medical History. All others are negative. Objective:     Physical Exam:  Visit Vitals  /67 (BP 1 Location: Right upper arm, BP Patient Position: Supine; At rest)   Pulse 80   Temp 97.8 °F (36.6 °C)   Resp 18   Ht 6' 2.02\" (1.88 m)   Wt 69.3 kg (152 lb 12.5 oz)   SpO2 93%   BMI 19.61 kg/m²        Skin:  Extremities and face reveal no rashes. No milian erythema. No telangiectasias on the chest wall. Red itchy patches neck, back, groin. HEENT: Sclerae anicteric. Extra-occular muscles are intact. No oral ulcers. No abnormal pigmentation of the lips. The neck is supple. Cardiovascular: Regular rate and rhythm. No murmurs, gallops, or rubs. PMI nondisplaced. Carotids without bruits. Respiratory:  Comfortable breathing with no accessory muscle use. Clear breath sounds with no wheezes, rales, or rhonchi. GI:  Abdomen nondistended, soft, and nontender. Normal active bowel sounds. No enlargement of the liver or spleen. No masses palpable. Rectal:  Deferred  Musculoskeletal:  No pitting edema of the lower legs. Extremities have good range of motion.   No costovertebral tenderness. Neurological: Awake, follows command  Psychiatric:  Not agitated  Lymphatic:  No cervical or supraclavicular adenopathy. Lab/Data Review:  Recent Results (from the past 24 hour(s))   GLUCOSE, POC    Collection Time: 03/31/21  4:01 PM   Result Value Ref Range    Glucose (POC) 115 (H) 65 - 100 mg/dL    Performed by Dahlia Case    CBC WITH AUTOMATED DIFF    Collection Time: 04/01/21  5:40 AM   Result Value Ref Range    WBC 5.4 4.1 - 11.1 K/uL    RBC 2.90 (L) 4.10 - 5.70 M/uL    HGB 8.0 (L) 12.1 - 17.0 g/dL    HCT 27.0 (L) 36.6 - 50.3 %    MCV 93.1 80.0 - 99.0 FL    MCH 27.6 26.0 - 34.0 PG    MCHC 29.6 (L) 30.0 - 36.5 g/dL    RDW 20.0 (H) 11.5 - 14.5 %    PLATELET 682 (L) 476 - 400 K/uL    MPV 11.5 8.9 - 12.9 FL    NRBC 0.0 0  WBC    ABSOLUTE NRBC 0.00 0.00 - 0.01 K/uL    NEUTROPHILS 82 (H) 32 - 75 %    LYMPHOCYTES 9 (L) 12 - 49 %    MONOCYTES 5 5 - 13 %    EOSINOPHILS 3 0 - 7 %    BASOPHILS 0 0 - 1 %    IMMATURE GRANULOCYTES 1 (H) 0.0 - 0.5 %    ABS. NEUTROPHILS 4.5 1.8 - 8.0 K/UL    ABS. LYMPHOCYTES 0.5 (L) 0.8 - 3.5 K/UL    ABS. MONOCYTES 0.3 0.0 - 1.0 K/UL    ABS. EOSINOPHILS 0.1 0.0 - 0.4 K/UL    ABS. BASOPHILS 0.0 0.0 - 0.1 K/UL    ABS. IMM. GRANS. 0.1 (H) 0.00 - 0.04 K/UL    DF AUTOMATED     METABOLIC PANEL, COMPREHENSIVE    Collection Time: 04/01/21  5:40 AM   Result Value Ref Range    Sodium 141 136 - 145 mmol/L    Potassium 3.3 (L) 3.5 - 5.1 mmol/L    Chloride 112 (H) 97 - 108 mmol/L    CO2 22 21 - 32 mmol/L    Anion gap 7 5 - 15 mmol/L    Glucose 92 65 - 100 mg/dL    BUN 16 6 - 20 mg/dL    Creatinine 0.99 0.70 - 1.30 mg/dL    BUN/Creatinine ratio 16 12 - 20      GFR est AA >60 >60 ml/min/1.73m2    GFR est non-AA >60 >60 ml/min/1.73m2    Calcium 7.8 (L) 8.5 - 10.1 mg/dL    Bilirubin, total 0.5 0.2 - 1.0 mg/dL    AST (SGOT) 66 (H) 15 - 37 U/L    ALT (SGPT) 55 12 - 78 U/L    Alk.  phosphatase 115 45 - 117 U/L    Protein, total 5.5 (L) 6.4 - 8.2 g/dL    Albumin 1.8 (L) 3.5 - 5.0 g/dL Globulin 3.7 2.0 - 4.0 g/dL    A-G Ratio 0.5 (L) 1.1 - 2.2     RENAL FUNCTION PANEL    Collection Time: 04/01/21  5:40 AM   Result Value Ref Range    Sodium 141 136 - 145 mmol/L    Potassium 3.3 (L) 3.5 - 5.1 mmol/L    Chloride 112 (H) 97 - 108 mmol/L    CO2 21 21 - 32 mmol/L    Anion gap 8 5 - 15 mmol/L    Glucose 90 65 - 100 mg/dL    BUN 17 6 - 20 mg/dL    Creatinine 0.98 0.70 - 1.30 mg/dL    BUN/Creatinine ratio 17 12 - 20      GFR est AA >60 >60 ml/min/1.73m2    GFR est non-AA >60 >60 ml/min/1.73m2    Calcium 7.9 (L) 8.5 - 10.1 mg/dL    Phosphorus 1.4 (L) 2.6 - 4.7 mg/dL    Albumin 1.7 (L) 3.5 - 5.0 g/dL   GLUCOSE, POC    Collection Time: 04/01/21  7:49 AM   Result Value Ref Range    Glucose (POC) 113 (H) 65 - 100 mg/dL    Performed by Omar Cough    GLUCOSE, POC    Collection Time: 04/01/21 11:32 AM   Result Value Ref Range    Glucose (POC) 87 65 - 100 mg/dL    Performed by Omar Cough         XR CHEST PORT   Final Result   NG tube position as above. Worsening lung aeration. CT ABD PELV WO CONT   Final Result   No acute abdominopelvic abnormality. Nonobstructing bilateral renal   calculi. Correlate for constipation given increased stool burden. End-stage   degenerative changes of the hips with postoperative change in the right   acetabulum. Pulmonary interstitial abnormality partially visualized in the acute   setting would suggest infection/inflammation or edema versus a combination of   these and in any case with early airspace component at the right lung base. CT HEAD WO CONT   Final Result   No acute intracranial abnormality. 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 Mild   generalized atrophy with nonspecific white matter abnormality that may indicate   chronic small vessel disease. 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             XR CHEST PORT   Final Result   Rotated exam. Diffuse interstitial abnormality and with question of   some superimposed nodular areas. In the acute setting edema, infection or   combination of these is considered however, especially given the possible   nodular opacities, follow-up to radiographic resolution and/or further   evaluation with chest CT if symptoms/findings do not resolve as expected with   treatment, or if indicated otherwise. Assessment/Plan:   Decrease oral intake. Schedule for PEG placement tomorrow. Please get consent. NPO post midnight      Active Problems:    Hypovolemia (3/27/2021)      Acute renal failure (ARF) (HCC) (3/27/2021)      Atrial fibrillation, rapid ([facility] (3/27/2021)      JAE (acute kidney injury) ([facility] [address] (3/27/2021)         IP CONSULT TO CARDIOLOGY  IP CONSULT TO NEPHROLOGY  IP CONSULT TO NEPHROLOGY  IP CONSULT TO NEUROLOGY  IP CONSULT TO PSYCHIATRY  IP CONSULT TO GASTROENTEROLOGY      Patient discussed with Dr Camilo De León and agrees to above impression and plan.   Thank you for allowing me to participate in this patients care    Kem Atwood FNP-C  Cc Referring Physician   Polina Payan MD

## 2021-04-01 NOTE — PROGRESS NOTES
Renal progress note    Patient: Paddy Cabot MRN: [id number]  SSN: [id number]    YOB: 1945  Age: 66 y.o. Sex: male      Subjective:   Pt is seen at bedside,, AMS+, no distress  Improving renal functions, creatinine down to 0.98 today  Sodium improved to 141      Past Medical History:   Diagnosis Date    Anemia     BPH (benign prostatic hyperplasia)     CKD (chronic kidney disease)     [facility]A [facility]cere[facility] vascular accident) ([facility]     Dementia ([facility] [address]     [facility]     GERD (gastroesophageal reflux disease)     Glaucoma     Hyperlipemia      No past surgical history on file. No family history on file.   Social History     Tobacco Use    Smoking status: Unknown If Ever Smoked   Substance Use Topics    Alcohol use: Not Currently      Current Facility-Administered Medications   Medication Dose Route Frequency Provider Last Rate Last Admin    mirtazapine (REMERON SOL-TAB) disintegrating tablet 15 mg  15 mg Oral QHS Tanner Choudhary MD        methylPREDNISolone (PF) (SOLU-MEDROL) injection 40 mg  40 mg IntraVENous Q6H Carlene Moon MD   40 mg at 04/01/21 1733    potassium chloride SR (KLOR-CON 10) tablet 40 mEq  40 mEq Oral NOW Carlene Moon MD        dextrose 5% 1,000 mL with sodium chloride 4 MEQ/ML (23.4%) 34.2 mEq infusion   IntraVENous CONTINUOUS Ray Simmons MD 75 mL/hr at 04/01/21 1248 New Bag at 04/01/21 1248    amiodarone (CORDARONE) tablet 400 mg  400 mg Oral BID Suhas Armas MD   400 mg at 03/30/21 1731    lactated Ringers infusion 1,000 mL  1,000 mL IntraVENous CONTINUOUS Jen Moon MD   Stopped at 03/27/21 0536    lactated Ringers infusion  125 mL/hr IntraVENous CONTINUOUS Carlene Moon  mL/hr at 03/27/21 0346 125 mL/hr at 03/27/21 0346    lactated Ringers infusion 1,000 mL  1,000 mL IntraVENous CONTINUOUS Jen Moon MD   Stopped at 03/27/21 0535    acetaminophen (TYLENOL) tablet 650 mg  650 mg Oral Q6H PRN Courtney Moon MD   650 mg at 03/31/21 0308    Or    acetaminophen (TYLENOL) suppository 650 mg  650 mg Rectal Q6H PRN Courtney Moon MD        polyethylene glycol (MIRALAX) packet 17 g  17 g Oral DAILY PRN Courtney Moon MD        ondansetron (ZOFRAN ODT) tablet 4 mg  4 mg Oral Q8H PRN Courtney Moon MD        Or    ondansetron [facility]) injection 4 mg  4 mg IntraVENous Q6H PRN Courtney Moon MD        heparin 25,000 units in D5W 250 ml infusion  12-25 Units/kg/hr IntraVENous TITRATE Nereyda Vázquez MD   Stopped at 03/30/21 0037    heparin (porcine) 1,000 unit/mL injection 3,400 Units  60 Units/kg IntraVENous PRN Nereyda Vázquez MD   30 Units at 03/29/21 1814    Or    heparin (porcine) 1,000 unit/mL injection 1,700 Units  30 Units/kg IntraVENous PRN Nereyda Vázquez MD   1,700 Units at 03/28/21 0406    cefTRIAXone (ROCEPHIN) 1 g in sterile water (preservative free) 10 mL IV syringe  1 g IntraVENous Q24H Jeff Amos MD   1 g at 04/01/21 1459    aspirin tablet 325 mg  325 mg Oral DAILY Carlene Moon MD   325 mg at 03/29/21 1019    atorvastatin (LIPITOR) tablet 40 mg  40 mg Oral QHS Carlene Moon MD   40 mg at 03/30/21 2250    lactated Ringers infusion 1,000 mL  1,000 mL IntraVENous CONTINUOUS Courtney Moon MD   1,000 mL at 03/26/21 2317        Allergies   Allergen Reactions    Flonase [Fluticasone] Unknown (comments)         Objective:     Vitals:    04/01/21 0739 04/01/21 1141 04/01/21 1527 04/01/21 1736   BP: (!) 115/58 116/67 (!) 91/47 (!) 112/59   Pulse: 80 80 81    Resp: 18 18 18    Temp: 97.8 °F (36.6 °C) 97.8 °F (36.6 °C) 98.2 °F (36.8 °C)    SpO2: 99% 93% 94%    Weight:       Height:            Physical Exam:  General: AMS  Eyes: sclera anicteric  Oral Cavity: No thrush or ulcers  Neck: no JVD  Chest: Fair bilateral air entry  Heart: normal sounds  Abdomen: soft and non tender   :  Vazquez+  Lower Extremities: no edema  Skin: no rash  Neuro: ams            Assessment:     Hospital Problems  Never Reviewed          Codes Class Noted POA    Hypovolemia ICD-10-CM: E86.1  ICD-9-CM: 276.52  3/27/2021 Unknown        Acute renal failure (ARF) (HCC) ICD-10-CM: N17.9  ICD-9-CM: 584.9  3/27/2021 Unknown        Atrial fibrillation, rapid [facility]) ICD-10-CM: I48.91  ICD-9-CM: 427.31  3/27/2021 Unknown        JAE (acute kidney injury) ([facility] [address] ICD-10-CM: N17.9  ICD-9-CM: 584.9  3/27/2021 Unknown              Plan:     1 acute kidney injury:   -Etiology is likely prerenal azotemia from volume depletion.    -On admission BUN/creatinine 207/8.1, has improved down to 127/4. 22. Improving renal functions with IV hydration, creatinine down to 3.19--2.24--1.22--0.98 today  --CT abdomen no evidence of hydronephrosis. Good urine output present  Will decrease IV fluids to 100 ml/hr continue to monitor renal functions    2. Hypernatremia.    -From free water deficit.    -Sodium 158-->155--157--154--147--140--141. Will continue hypotonic IVF at 75ml/hr for 1 mpore day and monitor sodium levels    3. Hyperkalemia. Improved potassium levels with medical management  -Continue to monitor potassium levels    4. Altered mental status.    -Could be metabolic encephalopathy/UTI. -CT head no acute process. -Urinalysis consistent with UTI.    -Continue to monitor clinically    Signed By: Sarah Griffith MD     April 1, 2021

## 2021-04-01 NOTE — PROGRESS NOTES
Problem: Pressure Injury - Risk of  Goal: *Prevention of pressure injury  Description: Document Stephen Scale and appropriate interventions in the flowsheet. Outcome: Progressing Towards Goal  Note: Pressure Injury Interventions:  Sensory Interventions: Assess changes in LOC, Keep linens dry and wrinkle-free, Minimize linen layers, Monitor skin under medical devices, Turn and reposition approx.  every two hours (pillows and wedges if needed), Discuss PT/OT consult with provider    Moisture Interventions: Internal/External urinary devices, Minimize layers, Moisture barrier, Check for incontinence Q2 hours and as needed, Apply protective barrier, creams and emollients, Absorbent underpads    Activity Interventions: PT/OT evaluation    Mobility Interventions: Float heels, PT/OT evaluation, HOB 30 degrees or less    Nutrition Interventions: Offer support with meals,snacks and hydration, Document food/fluid/supplement intake    Friction and Shear Interventions: Lift team/patient mobility team

## 2021-04-01 NOTE — PROGRESS NOTES
NEURO PROGRESS NOTE        SUBJECTIVE:   Failure to thrive  Mental status changes  Decreased p.o. intake  Noncompliance with medications  Confusion      EXAM:  Lethargic but slightly more awake this morning  Few words incoherently  Attempts to follow commands  No new focality      ASSESSMENT/PLAN:  Treatment continues    ALLERGIES:    Allergies   Allergen Reactions    Flonase [Fluticasone] Unknown (comments)       MEDS:      Current Facility-Administered Medications:     dextrose 5% 1,000 mL with sodium chloride 4 MEQ/ML (23.4%) 34.2 mEq infusion, , IntraVENous, CONTINUOUS, Ray Simmons MD, Last Rate: 75 mL/hr at 03/31/21 2220, Rate Change at 03/31/21 2220    amiodarone (CORDARONE) tablet 400 mg, 400 mg, Oral, BID, Kyra Barrera MD, 400 mg at 03/30/21 1731    lactated Ringers infusion 1,000 mL, 1,000 mL, IntraVENous, CONTINUOUS, Veronica Moon MD, Stopped at 03/27/21 0536    lactated Ringers infusion, 125 mL/hr, IntraVENous, CONTINUOUS, Carlene Moon MD, Last Rate: 125 mL/hr at 03/27/21 0346, 125 mL/hr at 03/27/21 0346    lactated Ringers infusion 1,000 mL, 1,000 mL, IntraVENous, CONTINUOUS, Veronica Moon MD, Stopped at 03/27/21 0535    acetaminophen (TYLENOL) tablet 650 mg, 650 mg, Oral, Q6H PRN, 650 mg at 03/31/21 0308 **OR** acetaminophen (TYLENOL) suppository 650 mg, 650 mg, Rectal, Q6H PRN, Veronica Moon MD    polyethylene glycol (MIRALAX) packet 17 g, 17 g, Oral, DAILY PRN, Veronica Moon MD    ondansetron (ZOFRAN ODT) tablet 4 mg, 4 mg, Oral, Q8H PRN **OR** ondansetron (ZOFRAN) injection 4 mg, 4 mg, IntraVENous, Q6H PRN, Carlene Moon MD    heparin 25,000 units in D5W 250 ml infusion, 12-25 Units/kg/hr, IntraVENous, TITRATE, Kyra Barrera MD, Stopped at 03/30/21 0037    heparin (porcine) 1,000 unit/mL injection 3,400 Units, 60 Units/kg, IntraVENous, PRN, 30 Units at 03/29/21 1814 **OR** heparin (porcine) 1,000 unit/mL injection 1,700 Units, 30 Units/kg, IntraVENous, PRN, Viviana Bo MD, 1,700 Units at 03/28/21 0406    cefTRIAXone (ROCEPHIN) 1 g in sterile water (preservative free) 10 mL IV syringe, 1 g, IntraVENous, Q24H, Brendan Coreas MD, 1 g at 03/31/21 1400    aspirin tablet 325 mg, 325 mg, Oral, DAILY, Carlene Moon MD, 325 mg at 03/29/21 1019    atorvastatin (LIPITOR) tablet 40 mg, 40 mg, Oral, QHS, Carlene Moon MD, 40 mg at 03/30/21 2250    lactated Ringers infusion 1,000 mL, 1,000 mL, IntraVENous, CONTINUOUS, Carlene Moon MD, 1,000 mL at 03/26/21 2317    LABS:  Recent Results (from the past 24 hour(s))   CBC WITH AUTOMATED DIFF    Collection Time: 03/31/21  9:28 AM   Result Value Ref Range    WBC 5.3 4.1 - 11.1 K/uL    RBC 3.07 (L) 4.10 - 5.70 M/uL    HGB 8.5 (L) 12.1 - 17.0 g/dL    HCT 28.5 (L) 36.6 - 50.3 %    MCV 92.8 80.0 - 99.0 FL    MCH 27.7 26.0 - 34.0 PG    MCHC 29.8 (L) 30.0 - 36.5 g/dL    RDW 20.5 (H) 11.5 - 14.5 %    PLATELET 614 (L) 954 - 400 K/uL    MPV 11.9 8.9 - 12.9 FL    NEUTROPHILS 84 (H) 32 - 75 %    LYMPHOCYTES 8 (L) 12 - 49 %    MONOCYTES 5 5 - 13 %    EOSINOPHILS 2 0 - 7 %    BASOPHILS 0 0 - 1 %    IMMATURE GRANULOCYTES 1 (H) 0.0 - 0.5 %    ABS. NEUTROPHILS 4.5 1.8 - 8.0 K/UL    ABS. LYMPHOCYTES 0.4 (L) 0.8 - 3.5 K/UL    ABS. MONOCYTES 0.3 0.0 - 1.0 K/UL    ABS. EOSINOPHILS 0.1 0.0 - 0.4 K/UL    ABS. BASOPHILS 0.0 0.0 - 0.1 K/UL    ABS. IMM.  GRANS. 0.1 (H) 0.00 - 0.04 K/UL    DF AUTOMATED     METABOLIC PANEL, COMPREHENSIVE    Collection Time: 03/31/21  9:28 AM   Result Value Ref Range    Sodium 140 136 - 145 mmol/L    Potassium 3.8 3.5 - 5.1 mmol/L    Chloride 112 (H) 97 - 108 mmol/L    CO2 23 21 - 32 mmol/L    Anion gap 5 5 - 15 mmol/L    Glucose 82 65 - 100 mg/dL    BUN 28 (H) 6 - 20 mg/dL    Creatinine 1.23 0.70 - 1.30 mg/dL    BUN/Creatinine ratio 23 (H) 12 - 20      GFR est AA >60 >60 ml/min/1.73m2    GFR est non-AA 57 (L) >60 ml/min/1.73m2    Calcium 8.4 (L) 8.5 - 10.1 mg/dL    Bilirubin, total 0.6 0.2 - 1.0 mg/dL    AST (SGOT) 77 (H) 15 - 37 U/L    ALT (SGPT) 58 12 - 78 U/L    Alk. phosphatase 122 (H) 45 - 117 U/L    Protein, total 5.8 (L) 6.4 - 8.2 g/dL    Albumin 2.0 (L) 3.5 - 5.0 g/dL    Globulin 3.8 2.0 - 4.0 g/dL    A-G Ratio 0.5 (L) 1.1 - 2.2     RENAL FUNCTION PANEL    Collection Time: 03/31/21  9:28 AM   Result Value Ref Range    Sodium 142 136 - 145 mmol/L    Potassium 3.7 3.5 - 5.1 mmol/L    Chloride 112 (H) 97 - 108 mmol/L    CO2 23 21 - 32 mmol/L    Anion gap 7 5 - 15 mmol/L    Glucose 81 65 - 100 mg/dL    BUN 28 (H) 6 - 20 mg/dL    Creatinine 1.22 0.70 - 1.30 mg/dL    BUN/Creatinine ratio 23 (H) 12 - 20      GFR est AA >60 >60 ml/min/1.73m2    GFR est non-AA 58 (L) >60 ml/min/1.73m2    Calcium 8.2 (L) 8.5 - 10.1 mg/dL    Phosphorus 1.4 (L) 2.6 - 4.7 mg/dL    Albumin 2.0 (L) 3.5 - 5.0 g/dL   GLUCOSE, POC    Collection Time: 03/31/21 10:56 AM   Result Value Ref Range    Glucose (POC) 92 65 - 100 mg/dL    Performed by Letitia Wong ([facility])    GLUCOSE, POC    Collection Time: 03/31/21  4:01 PM   Result Value Ref Range    Glucose (POC) 115 (H) 65 - 100 mg/dL    Performed by Jesús Wiley    CBC WITH AUTOMATED DIFF    Collection Time: 04/01/21  5:40 AM   Result Value Ref Range    WBC 5.4 4.1 - 11.1 K/uL    RBC 2.90 (L) 4.10 - 5.70 M/uL    HGB 8.0 (L) 12.1 - 17.0 g/dL    HCT 27.0 (L) 36.6 - 50.3 %    MCV 93.1 80.0 - 99.0 FL    MCH 27.6 26.0 - 34.0 PG    MCHC 29.6 (L) 30.0 - 36.5 g/dL    RDW 20.0 (H) 11.5 - 14.5 %    PLATELET 522 (L) 660 - 400 K/uL    MPV 11.5 8.9 - 12.9 FL    NRBC 0.0 0  WBC    ABSOLUTE NRBC 0.00 0.00 - 0.01 K/uL    NEUTROPHILS 82 (H) 32 - 75 %    LYMPHOCYTES 9 (L) 12 - 49 %    MONOCYTES 5 5 - 13 %    EOSINOPHILS 3 0 - 7 %    BASOPHILS 0 0 - 1 %    IMMATURE GRANULOCYTES 1 (H) 0.0 - 0.5 %    ABS. NEUTROPHILS 4.5 1.8 - 8.0 K/UL    ABS. LYMPHOCYTES 0.5 (L) 0.8 - 3.5 K/UL    ABS. MONOCYTES 0.3 0.0 - 1.0 K/UL    ABS. EOSINOPHILS 0.1 0.0 - 0.4 K/UL    ABS.  BASOPHILS 0.0 0.0 - 0.1 K/UL ABS. IMM. GRANS. 0.1 (H) 0.00 - 0.04 K/UL    DF AUTOMATED     METABOLIC PANEL, COMPREHENSIVE    Collection Time: 04/01/21  5:40 AM   Result Value Ref Range    Sodium 141 136 - 145 mmol/L    Potassium 3.3 (L) 3.5 - 5.1 mmol/L    Chloride 112 (H) 97 - 108 mmol/L    CO2 22 21 - 32 mmol/L    Anion gap 7 5 - 15 mmol/L    Glucose 92 65 - 100 mg/dL    BUN 16 6 - 20 mg/dL    Creatinine 0.99 0.70 - 1.30 mg/dL    BUN/Creatinine ratio 16 12 - 20      GFR est AA >60 >60 ml/min/1.73m2    GFR est non-AA >60 >60 ml/min/1.73m2    Calcium 7.8 (L) 8.5 - 10.1 mg/dL    Bilirubin, total 0.5 0.2 - 1.0 mg/dL    AST (SGOT) 66 (H) 15 - 37 U/L    ALT (SGPT) 55 12 - 78 U/L    Alk. phosphatase 115 45 - 117 U/L    Protein, total 5.5 (L) 6.4 - 8.2 g/dL    Albumin 1.8 (L) 3.5 - 5.0 g/dL    Globulin 3.7 2.0 - 4.0 g/dL    A-G Ratio 0.5 (L) 1.1 - 2.2     RENAL FUNCTION PANEL    Collection Time: 04/01/21  5:40 AM   Result Value Ref Range    Sodium 141 136 - 145 mmol/L    Potassium 3.3 (L) 3.5 - 5.1 mmol/L    Chloride 112 (H) 97 - 108 mmol/L    CO2 21 21 - 32 mmol/L    Anion gap 8 5 - 15 mmol/L    Glucose 90 65 - 100 mg/dL    BUN 17 6 - 20 mg/dL    Creatinine 0.98 0.70 - 1.30 mg/dL    BUN/Creatinine ratio 17 12 - 20      GFR est AA >60 >60 ml/min/1.73m2    GFR est non-AA >60 >60 ml/min/1.73m2    Calcium 7.9 (L) 8.5 - 10.1 mg/dL    Phosphorus 1.4 (L) 2.6 - 4.7 mg/dL    Albumin 1.7 (L) 3.5 - 5.0 g/dL   GLUCOSE, POC    Collection Time: 04/01/21  7:49 AM   Result Value Ref Range    Glucose (POC) 113 (H) 65 - 100 mg/dL    Performed by Jameel Love        Visit Vitals  BP (!) 115/58 (BP 1 Location: Right upper arm, BP Patient Position: At rest;Supine)   Pulse 80   Temp 97.8 °F (36.6 °C)   Resp 18   Ht 6' 2.02\" (1.88 m)   Wt 69.3 kg (152 lb 12.5 oz)   SpO2 99%   BMI 19.61 kg/m²       Imaging:  XR CHEST PORT   Final Result   NG tube position as above. Worsening lung aeration.       CT ABD PELV WO CONT   Final Result   No acute abdominopelvic abnormality. Nonobstructing bilateral renal   calculi. Correlate for constipation given increased stool burden. End-stage   degenerative changes of the hips with postoperative change in the right   acetabulum. Pulmonary interstitial abnormality partially visualized in the acute   setting would suggest infection/inflammation or edema versus a combination of   these and in any case with early airspace component at the right lung base. CT HEAD WO CONT   Final Result   No acute intracranial abnormality. 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 Mild   generalized atrophy with nonspecific white matter abnormality that may indicate   chronic small vessel disease. 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 XR CHEST PORT   Final Result   Rotated exam. Diffuse interstitial abnormality and with question of   some superimposed nodular areas. In the acute setting edema, infection or   combination of these is considered however, especially given the possible   nodular opacities, follow-up to radiographic resolution and/or further   evaluation with chest CT if symptoms/findings do not resolve as expected with   treatment, or if indicated otherwise.

## 2021-04-01 NOTE — PROGRESS NOTES
Physician Progress Note      PATIENT:               Jhony Amos  CSN #:                  [id number]  :                       1945  ADMIT DATE:       3/26/2021 8:44 PM  [address] [place] DATE:  RESPONDING  PROVIDER #:        Dav Padron MD          QUERY TEXT:    Patient admitted with rapid Afib. Per wound care nurse, noted to also have deep tissue injury to R hip. If possible, please document in progress notes and discharge summary the stage of the pressure ulcer: The medical record reflects the following:  Risk Factors: 76yo, decreased bed mobility    Clinical Indicators:    Wound Hip Right sDTI, Dry;Purple/maroon; Other (Comment); Non-blanchable erythema    Treatment: Turn/reposition approximately every 2 hours; pillow wedges; Speciality bed Isogel pressure reduction bed with air pump    Stage 1:  Non-blanchable erythema of intact skin  Stage 2:  Abrasion, Blister, Partial-thickness skin loss, with exposed dermis  Stage 3:  Full-thickness skin loss with damage or necrosis of subcutaneous tissue  Stage 4:  Full-thickness skin & soft tissue loss through to underlying muscle, tendon or bone  Unstageable: Obscured full-thickness skin & tissue loss      Thank you,  Heike Tanner, RN, BSN, [facility]  Clinical   [phone]  Options provided:  -- Deep tissue injury pressure ulcer of right hip present on admission  -- Deep tissue injury pressure ulcer of right hip NOT present on admission  -- Other - I will add my own diagnosis  -- Disagree - Not applicable / Not valid  -- Disagree - Clinically unable to determine / Unknown  -- Refer to Clinical Documentation Reviewer    PROVIDER RESPONSE TEXT:    This patient has a deep tissue injury pressure ulcer of the right hip which was present on admission. Query created by: Arlester Primrose on 3/29/2021 11:27 AM      QUERY TEXT:    Patient admitted with rapid afib.  Noted documentation of metabolic encephalopathy in a patient with dementia and who is non verbal. In order to support the diagnosis of metabolic encephalopathy, please include additional clinical indicators in your documentation. Or please document if the diagnosis of metabolic encephalopathy has been ruled out after further study. The medical record reflects the following:  Risk Factors: JAE, UTI    Clinical Indicators:    Rosanna Vazquez RN 3/26-  Pt at baseline mental status per staff. SHEA REYES dated 3/27-  Altered mental status.  -Could be metabolic encephalopathy/UTI. -CT head no acute process. -Urinalysis consistent with UTI. - I will put on IV ceftriaxone.  -Could be uremia also    BRETT Porter PN 3/28-  patient seen resting in bed and nonconversant (baseline)    CT Head= Encephalomalacia in the left parietal cortex with focal hypodensities in the bilateral cerebellar hemispheres suggesting small prior  infarcts    Treatment: CT Head; Rocephin 1g IV daily; Zosyn 3.375g IV x 1; Thank you,  Phylicia Barclay, RN, BSN, [facility]  Clinical   [phone]  Options provided:  -- Metabolic encephalopathy superimposed on dementia present as evidenced by, Please document evidence. -- Metabolic encephalopathy superimposed on dementia was ruled out  -- Dementia  -- Other - I will add my own diagnosis  -- Disagree - Not applicable / Not valid  -- Disagree - Clinically unable to determine / Unknown  -- Refer to Clinical Documentation Reviewer    PROVIDER RESPONSE TEXT:    Metabolic encephalopathy superimposed on dementia is present as evidenced by hx of dementia    Query created by: Overton Gaucher on 3/30/2021 1:41 PM      QUERY TEXT:    Patient admitted with rapid afib. Noted documentation of UTI in H&P and subsequent PNs. In order to support the diagnosis of UTI, please include additional clinical indicators in your documentation. Or please document if the diagnosis of UTI has been ruled out after further study.     The medical record reflects the following:  Risk Factors:    Clinical Indicators:    UA turbid, Blood LG, Leuks LG, WBC 20-50  No Growth (<1000 cfu/mL)    Treatment: Rocephin 1g IV daily; Zosyn 3.375g IV x 1      Thank you,  Cary Cortes, RN, BSN, [facility]  Clinical   [phone]  Options provided:  -- UTI present as evidenced by, Please document evidence. -- UTI was ruled out  -- Other - I will add my own diagnosis  -- Disagree - Not applicable / Not valid  -- Disagree - Clinically unable to determine / Unknown  -- Refer to Clinical Documentation Reviewer    PROVIDER RESPONSE TEXT:    UTI was ruled out after study.     Query created by: Lucio Fonseca on 3/31/2021 10:35 AM      Electronically signed by:  Janalyn Nares MD Lezlie Dakin MD 3/31/2021 8:00 PM

## 2021-04-02 ENCOUNTER — APPOINTMENT (OUTPATIENT)
Dept: NON INVASIVE DIAGNOSTICS | Age: 76
DRG: 682 | End: 2021-04-02
Attending: INTERNAL MEDICINE
Payer: MEDICARE

## 2021-04-02 ENCOUNTER — ANESTHESIA EVENT (OUTPATIENT)
Dept: ENDOSCOPY | Age: 76
DRG: 682 | End: 2021-04-02
Payer: MEDICARE

## 2021-04-02 ENCOUNTER — ANESTHESIA (OUTPATIENT)
Dept: ENDOSCOPY | Age: 76
DRG: 682 | End: 2021-04-02
Payer: MEDICARE

## 2021-04-02 ENCOUNTER — APPOINTMENT (OUTPATIENT)
Dept: ENDOSCOPY | Age: 76
DRG: 682 | End: 2021-04-02
Attending: INTERNAL MEDICINE
Payer: MEDICARE

## 2021-04-02 LAB
ALBUMIN SERPL-MCNC: 1.9 G/DL (ref 3.5–5)
ALBUMIN SERPL-MCNC: 1.9 G/DL (ref 3.5–5)
ALBUMIN/GLOB SERPL: 0.5 {RATIO} (ref 1.1–2.2)
ALP SERPL-CCNC: 122 U/L (ref 45–117)
ALT SERPL-CCNC: 53 U/L (ref 12–78)
ANION GAP SERPL CALC-SCNC: 8 MMOL/L (ref 5–15)
ANION GAP SERPL CALC-SCNC: 9 MMOL/L (ref 5–15)
AST SERPL W P-5'-P-CCNC: 33 U/L (ref 15–37)
BACTERIA SPEC CULT: NORMAL
BASOPHILS NFR BLD: 0 % (ref 0–1)
BILIRUB SERPL-MCNC: 0.4 MG/DL (ref 0.2–1)
BUN SERPL-MCNC: 11 MG/DL (ref 6–20)
BUN SERPL-MCNC: 12 MG/DL (ref 6–20)
BUN/CREAT SERPL: 13 (ref 12–20)
BUN/CREAT SERPL: 14 (ref 12–20)
CA-I BLD-MCNC: 7.9 MG/DL (ref 8.5–10.1)
CA-I BLD-MCNC: 7.9 MG/DL (ref 8.5–10.1)
CHLORIDE SERPL-SCNC: 108 MMOL/L (ref 97–108)
CHLORIDE SERPL-SCNC: 109 MMOL/L (ref 97–108)
CO2 SERPL-SCNC: 22 MMOL/L (ref 21–32)
CO2 SERPL-SCNC: 22 MMOL/L (ref 21–32)
CREAT SERPL-MCNC: 0.85 MG/DL (ref 0.7–1.3)
CREAT SERPL-MCNC: 0.86 MG/DL (ref 0.7–1.3)
DIFFERENTIAL METHOD BLD: ABNORMAL
ECHO AO SINUS VALSALVA DIAM: 3.6 CM
ECHO AR MAX VEL PISA: 339 CM/S
ECHO AV PEAK GRADIENT: 9 MMHG
ECHO AV REGURGITANT PHT: 758 MS
ECHO EST RA PRESSURE: 3 MMHG
ECHO LA MAJOR AXIS: 4.1 CM
ECHO LA MINOR AXIS: 2.12 CM
ECHO LV E' SEPTAL VELOCITY: 7.41 CM/S
ECHO LV EDV A2C: 96.1 CM3
ECHO LV EJECTION FRACTION BIPLANE: 56.8 % (ref 55–100)
ECHO LV ESV A2C: 33.4 CM3
ECHO LV INTERNAL DIMENSION DIASTOLIC: 4.58 CM (ref 4.2–5.9)
ECHO LV INTERNAL DIMENSION SYSTOLIC: 3.22 CM
ECHO LV IVSD: 1.05 CM (ref 0.6–1)
ECHO LV MASS 2D: 208.5 G (ref 88–224)
ECHO LV MASS INDEX 2D: 107.7 G/M2 (ref 49–115)
ECHO LV POSTERIOR WALL DIASTOLIC: 1.39 CM (ref 0.6–1)
ECHO LVOT PEAK GRADIENT: 8 MMHG
ECHO MV A VELOCITY: 79 CM/S
ECHO MV E DECELERATION TIME (DT): 197 MS
ECHO MV E VELOCITY: 79 CM/S
ECHO MV E/A RATIO: 1
ECHO MV E/E' SEPTAL: 10.66
ECHO PV PEAK INSTANTANEOUS GRADIENT SYSTOLIC: 5 MMHG
ECHO PV PEAK INSTANTANEOUS GRADIENT SYSTOLIC: 6 MMHG
ECHO PV REGURGITANT MAX VELOCITY: 116 CM/S
ECHO PV REGURGITANT MAX VELOCITY: 119 CM/S
ECHO PV REGURGITANT MAX VELOCITY: 139 CM/S
ECHO PV REGURGITANT MAX VELOCITY: 150 CM/S
ECHO PV REGURGITANT MAX VELOCITY: 523 CM/S
ECHO PVEIN A DURATION: 102 MS
ECHO PVEIN A VELOCITY: 38 CM/S
ECHO RIGHT VENTRICULAR SYSTOLIC PRESSURE (RVSP): 35 MMHG
ECHO TV MAX VELOCITY: 282 CM/S
ECHO TV REGURGITANT PEAK GRADIENT: 32 MMHG
EOSINOPHIL NFR BLD: 0 % (ref 0–7)
ERYTHROCYTE [DISTWIDTH] IN BLOOD BY AUTOMATED COUNT: 19.1 % (ref 11.5–14.5)
FOLATE SERPL-MCNC: 14.4 NG/ML (ref 5–21)
GLOBULIN SER CALC-MCNC: 4 G/DL (ref 2–4)
GLUCOSE BLD STRIP.AUTO-MCNC: 122 MG/DL (ref 65–100)
GLUCOSE BLD STRIP.AUTO-MCNC: 128 MG/DL (ref 65–100)
GLUCOSE SERPL-MCNC: 117 MG/DL (ref 65–100)
GLUCOSE SERPL-MCNC: 119 MG/DL (ref 65–100)
HCT VFR BLD AUTO: 27.6 % (ref 36.6–50.3)
HGB BLD-MCNC: 8.4 G/DL (ref 12.1–17)
IMM GRANULOCYTES # BLD AUTO: 0 K/UL (ref 0–0.04)
IMM GRANULOCYTES NFR BLD AUTO: 0 % (ref 0–0.5)
LYMPHOCYTES NFR BLD: 5 % (ref 12–49)
MCH RBC QN AUTO: 27.9 PG (ref 26–34)
MCHC RBC AUTO-ENTMCNC: 30.4 G/DL (ref 30–36.5)
MCV RBC AUTO: 91.7 FL (ref 80–99)
MONOCYTES NFR BLD: 2 % (ref 5–13)
NEUTS SEG NFR BLD: 93 % (ref 32–75)
PERFORMED BY, TECHID: ABNORMAL
PERFORMED BY, TECHID: ABNORMAL
PHOSPHATE SERPL-MCNC: 2.1 MG/DL (ref 2.6–4.7)
PLATELET # BLD AUTO: 154 K/UL (ref 150–400)
PMV BLD AUTO: 12.2 FL (ref 8.9–12.9)
POTASSIUM SERPL-SCNC: 3.6 MMOL/L (ref 3.5–5.1)
POTASSIUM SERPL-SCNC: 3.6 MMOL/L (ref 3.5–5.1)
PROT SERPL-MCNC: 5.9 G/DL (ref 6.4–8.2)
RBC # BLD AUTO: 3.01 M/UL (ref 4.1–5.7)
SODIUM SERPL-SCNC: 139 MMOL/L (ref 136–145)
SODIUM SERPL-SCNC: 139 MMOL/L (ref 136–145)
SPECIAL REQUESTS,SREQ: NORMAL
VIT B12 SERPL-MCNC: 1113 PG/ML (ref 193–986)
WBC # BLD AUTO: 7.9 K/UL (ref 4.1–11.1)

## 2021-04-02 PROCEDURE — 74011000250 HC RX REV CODE- 250: Performed by: INTERNAL MEDICINE

## 2021-04-02 PROCEDURE — 0DH64UZ INSERTION OF FEEDING DEVICE INTO STOMACH, PERCUTANEOUS ENDOSCOPIC APPROACH: ICD-10-PCS | Performed by: INTERNAL MEDICINE

## 2021-04-02 PROCEDURE — 93306 TTE W/DOPPLER COMPLETE: CPT

## 2021-04-02 PROCEDURE — 76040000009: Performed by: INTERNAL MEDICINE

## 2021-04-02 PROCEDURE — 82962 GLUCOSE BLOOD TEST: CPT

## 2021-04-02 PROCEDURE — 76060000034 HC ANESTHESIA 1.5 TO 2 HR: Performed by: INTERNAL MEDICINE

## 2021-04-02 PROCEDURE — 74011250637 HC RX REV CODE- 250/637: Performed by: PSYCHIATRY & NEUROLOGY

## 2021-04-02 PROCEDURE — 80053 COMPREHEN METABOLIC PANEL: CPT

## 2021-04-02 PROCEDURE — 74011250636 HC RX REV CODE- 250/636: Performed by: NURSE ANESTHETIST, CERTIFIED REGISTERED

## 2021-04-02 PROCEDURE — 74011250636 HC RX REV CODE- 250/636: Performed by: INTERNAL MEDICINE

## 2021-04-02 PROCEDURE — 74011250637 HC RX REV CODE- 250/637: Performed by: FAMILY MEDICINE

## 2021-04-02 PROCEDURE — 65270000029 HC RM PRIVATE

## 2021-04-02 PROCEDURE — 77030016831 HC CATH GASTMY PEG1 BSC -B: Performed by: INTERNAL MEDICINE

## 2021-04-02 PROCEDURE — 36415 COLL VENOUS BLD VENIPUNCTURE: CPT

## 2021-04-02 PROCEDURE — 80069 RENAL FUNCTION PANEL: CPT

## 2021-04-02 PROCEDURE — 0DJ08ZZ INSPECTION OF UPPER INTESTINAL TRACT, VIA NATURAL OR ARTIFICIAL OPENING ENDOSCOPIC: ICD-10-PCS | Performed by: INTERNAL MEDICINE

## 2021-04-02 PROCEDURE — 74011250636 HC RX REV CODE- 250/636: Performed by: FAMILY MEDICINE

## 2021-04-02 PROCEDURE — 74011250637 HC RX REV CODE- 250/637: Performed by: INTERNAL MEDICINE

## 2021-04-02 PROCEDURE — 77030012058: Performed by: INTERNAL MEDICINE

## 2021-04-02 PROCEDURE — 85025 COMPLETE CBC W/AUTO DIFF WBC: CPT

## 2021-04-02 PROCEDURE — 74011000250 HC RX REV CODE- 250: Performed by: NURSE ANESTHETIST, CERTIFIED REGISTERED

## 2021-04-02 PROCEDURE — 84146 ASSAY OF PROLACTIN: CPT

## 2021-04-02 RX ORDER — PROPOFOL 10 MG/ML
INJECTION, EMULSION INTRAVENOUS
Status: COMPLETED
Start: 2021-04-02 | End: 2021-04-02

## 2021-04-02 RX ORDER — SODIUM CHLORIDE 9 MG/ML
INJECTION, SOLUTION INTRAVENOUS
Status: DISCONTINUED | OUTPATIENT
Start: 2021-04-02 | End: 2021-04-02 | Stop reason: HOSPADM

## 2021-04-02 RX ORDER — PROPOFOL 10 MG/ML
INJECTION, EMULSION INTRAVENOUS AS NEEDED
Status: DISCONTINUED | OUTPATIENT
Start: 2021-04-02 | End: 2021-04-02 | Stop reason: HOSPADM

## 2021-04-02 RX ORDER — LIDOCAINE HYDROCHLORIDE 20 MG/ML
INJECTION, SOLUTION EPIDURAL; INFILTRATION; INTRACAUDAL; PERINEURAL
Status: COMPLETED
Start: 2021-04-02 | End: 2021-04-02

## 2021-04-02 RX ORDER — LIDOCAINE HYDROCHLORIDE 20 MG/ML
INJECTION, SOLUTION EPIDURAL; INFILTRATION; INTRACAUDAL; PERINEURAL AS NEEDED
Status: DISCONTINUED | OUTPATIENT
Start: 2021-04-02 | End: 2021-04-02 | Stop reason: HOSPADM

## 2021-04-02 RX ADMIN — METHYLPREDNISOLONE SODIUM SUCCINATE 40 MG: 40 INJECTION, POWDER, FOR SOLUTION INTRAMUSCULAR; INTRAVENOUS at 05:39

## 2021-04-02 RX ADMIN — METHYLPREDNISOLONE SODIUM SUCCINATE 40 MG: 40 INJECTION, POWDER, FOR SOLUTION INTRAMUSCULAR; INTRAVENOUS at 00:47

## 2021-04-02 RX ADMIN — CEFTRIAXONE SODIUM 1 G: 1 INJECTION, POWDER, FOR SOLUTION INTRAMUSCULAR; INTRAVENOUS at 14:09

## 2021-04-02 RX ADMIN — METHYLPREDNISOLONE SODIUM SUCCINATE 40 MG: 40 INJECTION, POWDER, FOR SOLUTION INTRAMUSCULAR; INTRAVENOUS at 23:41

## 2021-04-02 RX ADMIN — ATORVASTATIN CALCIUM 40 MG: 40 TABLET, FILM COATED ORAL at 23:41

## 2021-04-02 RX ADMIN — LIDOCAINE HYDROCHLORIDE 50 MG: 20 INJECTION, SOLUTION EPIDURAL; INFILTRATION; INTRACAUDAL; PERINEURAL at 12:21

## 2021-04-02 RX ADMIN — ACETAMINOPHEN 650 MG: 325 TABLET, FILM COATED ORAL at 23:54

## 2021-04-02 RX ADMIN — AMIODARONE HYDROCHLORIDE 400 MG: 200 TABLET ORAL at 23:41

## 2021-04-02 RX ADMIN — LIDOCAINE HYDROCHLORIDE 50 MG: 20 INJECTION, SOLUTION EPIDURAL; INFILTRATION; INTRACAUDAL; PERINEURAL at 12:16

## 2021-04-02 RX ADMIN — PROPOFOL 50 MG: 10 INJECTION, EMULSION INTRAVENOUS at 12:20

## 2021-04-02 RX ADMIN — PROPOFOL 50 MG: 10 INJECTION, EMULSION INTRAVENOUS at 12:16

## 2021-04-02 RX ADMIN — SODIUM CHLORIDE: 234 INJECTION, SOLUTION, CONCENTRATE INTRAVENOUS at 23:41

## 2021-04-02 RX ADMIN — MIRTAZAPINE 15 MG: 15 TABLET, ORALLY DISINTEGRATING ORAL at 23:54

## 2021-04-02 RX ADMIN — METHYLPREDNISOLONE SODIUM SUCCINATE 40 MG: 40 INJECTION, POWDER, FOR SOLUTION INTRAMUSCULAR; INTRAVENOUS at 14:09

## 2021-04-02 RX ADMIN — SODIUM CHLORIDE: 234 INJECTION, SOLUTION, CONCENTRATE INTRAVENOUS at 01:17

## 2021-04-02 RX ADMIN — PROPOFOL 50 MG: 10 INJECTION, EMULSION INTRAVENOUS at 12:26

## 2021-04-02 RX ADMIN — SODIUM CHLORIDE: 9 INJECTION, SOLUTION INTRAVENOUS at 11:50

## 2021-04-02 NOTE — ANESTHESIA PREPROCEDURE EVALUATION
Relevant Problems   CARDIOVASCULAR   (+) Atrial fibrillation, rapid (HCC)      RENAL FAILURE   (+) JAE (acute kidney injury) ([facility] [address]   (+) Acute renal failure (ARF) ([facility])       Anesthetic History   No history of anesthetic complications            Review of Systems / Medical History  Patient summary reviewed, nursing notes reviewed and pertinent labs reviewed    Pulmonary  Within defined limits                 Neuro/Psych       CVA  TIA and dementia     Cardiovascular              Hyperlipidemia         GI/Hepatic/Renal     GERD           Endo/Other        Anemia     Other Findings   Comments: Allergies  Flonase (Fluticasone)  Ht: 6' 2.02\" (188 cm)  Weight: 69.3 kg (152 lb 12.5 oz)  BMI: 19.61 kg/m²  CrCl:   61.3 mL/min  Procedure  PERCUTANEOUS ENDOSCOPIC GASTROSTOMY TUBE INSERTION (N/A )  Scheduled, [facility] ENDO 01      Medical History  CKD (chronic kidney disease)  CVA (cerebral vascular accident) ([facility] [address]  Anemia  Hyperlipemia  Dementia (HCC)  Glaucoma  DVT (deep venous thrombosis) ([facility])  GERD (gastroesophageal reflux disease)  BPH (benign prostatic hyperplasia)           Physical Exam    Airway  Mallampati: II  TM Distance: 4 - 6 cm  Neck ROM: normal range of motion   Mouth opening: Normal     Cardiovascular    Rhythm: regular  Rate: normal         Dental         Pulmonary  Breath sounds clear to auscultation               Abdominal  GI exam deferred       Other Findings   Comments: Results for Ernesto Wu (MRN [id number]) as of 4/2/2021 10:32    4/1/2021 05:40  WBC: 5.4  NRBC: 0.0  RBC: 2.90 (L)  HGB: 8.0 (L)  HCT: 27.0 (L)  MCV: 93.1  MCH: 27.6  MCHC: 29.6 (L)  RDW: 20.0 (H)  PLATELET: 936 (L)  MPV: 11.5  NEUTROPHILS: 82 (H)  LYMPHOCYTES: 9 (L)  MONOCYTES: 5  EOSINOPHILS: 3  BASOPHILS: 0  IMMATURE GRANULOCYTES: 1 (H)  DF: AUTOMATED  ABSOLUTE NRBC: 0.00  ABS. NEUTROPHILS: 4.5  ABS. IMM. GRANS.: 0.1 (H)  ABS. LYMPHOCYTES: 0.5 (L)  ABS. MONOCYTES: 0.3  ABS. EOSINOPHILS: 0.1  ABS.  BASOPHILS: 0.0    Results for Alysha Banks (MRN [id number]) as of 4/2/2021 10:32    4/1/2021 05:40  Sodium: 141  Potassium: 3.3 (L)  Chloride: 112 (H)  CO2: 21  Anion gap: 8  Glucose: 90  BUN: 17  Creatinine: 0.98  BUN/Creatinine ratio: 17  Calcium: 7.9 (L)  Phosphorus: 1.4 (L)  GFR est non-AA: >60  GFR est AA: >60  Albumin: 1.7 (L)         Anesthetic Plan    ASA: 4  Anesthesia type: total IV anesthesia and general    Monitoring Plan: Continuous noninvasive hemodynamic monitoring      Induction: Intravenous  Anesthetic plan and risks discussed with: Patient and Family      General anesthesia was prescribed for this patient because by definition it is \"a drug-induced loss of consciousness during which patients are not arousable, even by painful stimulation. \" Sometimes, the ability to independently maintain ventilatory function is often impaired and patients often require assistance in maintaining a patent airway. Occasionally, positive pressure ventilation may be required because of depressed spontaneous ventilation or drug-induced depression of neuromuscular function. This depth of anesthesia is preferred for endoscopic/esophageal procedures to facilitate the procedure and for patient safety/quality of care.

## 2021-04-02 NOTE — PROGRESS NOTES
Problem: Pressure Injury - Risk of  Goal: *Prevention of pressure injury  Description: Document Stephen Scale and appropriate interventions in the flowsheet. Outcome: Progressing Towards Goal  Note: Pressure Injury Interventions:  Sensory Interventions: Assess changes in LOC, Assess need for specialty bed, Discuss PT/OT consult with provider, Float heels, Keep linens dry and wrinkle-free, Minimize linen layers, Monitor skin under medical devices, Turn and reposition approx.  every two hours (pillows and wedges if needed), Pressure redistribution bed/mattress (bed type)    Moisture Interventions: Absorbent underpads, Apply protective barrier, creams and emollients, Internal/External urinary devices, Minimize layers, Moisture barrier    Activity Interventions: PT/OT evaluation    Mobility Interventions: PT/OT evaluation, HOB 30 degrees or less    Nutrition Interventions: Offer support with meals,snacks and hydration    Friction and Shear Interventions: Minimize layers, HOB 30 degrees or less, Apply protective barrier, creams and emollients, Transferring/repositioning devices

## 2021-04-02 NOTE — PROGRESS NOTES
NEURO PROGRESS NOTE        SUBJECTIVE:   Failure to thrive  Mental status changes  Decreased p.o. intake  Noncompliance with medications  Confusion      EXAM:  Lethargic but slightly more awake  [place]  Diffuse weakness stable  No new focality      ASSESSMENT/PLAN:  Current treatment continues    ALLERGIES:    Allergies   Allergen Reactions    Flonase [Fluticasone] Unknown (comments)       MEDS:      Current Facility-Administered Medications:     mirtazapine (REMERON SOL-TAB) disintegrating tablet 15 mg, 15 mg, Oral, QHS, Tanner Choudhary MD    methylPREDNISolone (PF) (SOLU-MEDROL) injection 40 mg, 40 mg, IntraVENous, Q6H, Carlene Moon MD, 40 mg at 04/02/21 0539    dextrose 5% 1,000 mL with sodium chloride 4 MEQ/ML (23.4%) 34.2 mEq infusion, , IntraVENous, CONTINUOUS, Ray Simmons MD, Last Rate: 75 mL/hr at 04/02/21 0117, New Bag at 04/02/21 0117    amiodarone (CORDARONE) tablet 400 mg, 400 mg, Oral, BID, Kayla Amor MD, 400 mg at 03/30/21 1731    acetaminophen (TYLENOL) tablet 650 mg, 650 mg, Oral, Q6H PRN, 650 mg at 03/31/21 0308 **OR** acetaminophen (TYLENOL) suppository 650 mg, 650 mg, Rectal, Q6H PRN, Patrice Moon MD    polyethylene glycol (MIRALAX) packet 17 g, 17 g, Oral, DAILY PRN, Patrice Moon MD    ondansetron (ZOFRAN ODT) tablet 4 mg, 4 mg, Oral, Q8H PRN **OR** ondansetron (ZOFRAN) injection 4 mg, 4 mg, IntraVENous, Q6H PRN, Carlene Moon MD    cefTRIAXone (ROCEPHIN) 1 g in sterile water (preservative free) 10 mL IV syringe, 1 g, IntraVENous, Q24H, Brendan Coreas MD, 1 g at 04/01/21 1459    aspirin tablet 325 mg, 325 mg, Oral, DAILY, Carlene Moon MD, 325 mg at 03/29/21 1019    atorvastatin (LIPITOR) tablet 40 mg, 40 mg, Oral, QHS, Carlene Moon MD, 40 mg at 03/30/21 2250    LABS:  Recent Results (from the past 24 hour(s))   GLUCOSE, POC    Collection Time: 04/01/21 11:32 AM   Result Value Ref Range    Glucose (POC) 87 65 - 100 mg/dL    Performed by Susi Julian    GLUCOSE, POC    Collection Time: 04/01/21  4:16 PM   Result Value Ref Range    Glucose (POC) 98 65 - 100 mg/dL    Performed by Susi Julian    GLUCOSE, POC    Collection Time: 04/02/21  7:32 AM   Result Value Ref Range    Glucose (POC) 128 (H) 65 - 100 mg/dL    Performed by Krista Ahr        Visit Vitals  /62 (BP 1 Location: Right upper arm, BP Patient Position: At rest;Supine)   Pulse 73   Temp 97.8 °F (36.6 °C)   Resp 20   Ht 6' 2.02\" (1.88 m)   Wt 69.3 kg (152 lb 12.5 oz)   SpO2 95%   BMI 19.61 kg/m²       Imaging:  XR CHEST PORT   Final Result   NG tube position as above. Worsening lung aeration. CT ABD PELV WO CONT   Final Result   No acute abdominopelvic abnormality. Nonobstructing bilateral renal   calculi. Correlate for constipation given increased stool burden. End-stage   degenerative changes of the hips with postoperative change in the right   acetabulum. Pulmonary interstitial abnormality partially visualized in the acute   setting would suggest infection/inflammation or edema versus a combination of   these and in any case with early airspace component at the right lung base. CT HEAD WO CONT   Final Result   No acute intracranial abnormality. 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 Mild   generalized atrophy with nonspecific white matter abnormality that may indicate   chronic small vessel disease. 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 XR CHEST PORT   Final Result   Rotated exam. Diffuse interstitial abnormality and with question of   some superimposed nodular areas. In the acute setting edema, infection or   combination of these is considered however, especially given the possible   nodular opacities, follow-up to radiographic resolution and/or further   evaluation with chest CT if symptoms/findings do not resolve as expected with   treatment, or if indicated otherwise.

## 2021-04-02 NOTE — PROGRESS NOTES
Progress Note    Patient: Daquan Ortiz MRN: [id number]  SSN: [id number]    YOB: 1945  Age: 66 y.o. Sex: male      Admit Date: 3/26/2021    LOS: 6 days     Subjective:     Patient resting in room. S/p PEG tube placement for nutrition support. Rash has slightly improved. He has bilateral  mittens on.  - Patient was admitted to the hospital from the nursing facility with complains or poor oral intake. Staff also noted decrease urine output. -PMH includes anemia, BPH, stroke, CVA, dementia, HLD. Objective:     Vitals:    04/02/21 1245 04/02/21 1250 04/02/21 1255 04/02/21 1300   BP: (!) 99/48 (!) 95/43 (!) 109/48 (!) 106/49   Pulse: 75 71 72 92   Resp: 20 20 19 20   Temp:       SpO2: 92% 93% 93% 92%   Weight:       Height:            Intake and Output:  Current Shift: 04/02 0701 - 04/02 1900  In: 350 [I.V.:350]  Out: -   Last three shifts: 03/31 1901 - 04/02 0700  In: 0   Out: 550 [Urine:550]    Physical Exam:   Skin:  Extremities and face reveal no rashes. No milian erythema. HEENT: Sclerae anicteric. Extra-occular muscles are intact. No abnormal pigmentation of the lips. The neck is supple. Cardiovascular: Regular rate and rhythm. Respiratory:  Comfortable breathing with no accessory muscle use. GI:  Abdomen nondistended, soft, and nontender. No enlargement of the liver or spleen. No masses palpable. PEG in place. Rectal:  Deferred  Musculoskeletal: Extremities have good range of motion. Neurological:  Non-conversant  Psychiatric:  Not agitated.   Lymphatic:  No visible adenopathy      Lab/Data Review:  Recent Results (from the past 24 hour(s))   VITAMIN B12 & FOLATE    Collection Time: 04/01/21  3:45 PM   Result Value Ref Range    Vitamin B12 1,113 (H) 193 - 986 pg/mL    Folate 14.4 5.0 - 21.0 ng/mL   GLUCOSE, POC    Collection Time: 04/01/21  4:16 PM   Result Value Ref Range    Glucose (POC) 98 65 - 100 mg/dL    Performed by Jose R 25, POC    Collection Time: 04/02/21  7:32 AM   Result Value Ref Range    Glucose (POC) 128 (H) 65 - 100 mg/dL    Performed by Lula Couchch    ECHO ADULT COMPLETE    Collection Time: 04/02/21 10:03 AM   Result Value Ref Range    Aortic Regurgitant Pressure Half-time 758.00 ms    AR Max Reuben 339.00 cm/s    Pulmonic Regurgitant End Max Velocity 150.00 cm/s    AoV PG 9.00 mmHg    IVSd 1.05 (A) 0.60 - 1.00 cm    LVIDd 4.58 4.20 - 5.90 cm    LVIDs 3.22 cm    Pulmonic Regurgitant End Max Velocity 139.00 cm/s    LVOT Peak Gradient 8.00 mmHg    LVPWd 1.39 (A) 0.60 - 1.00 cm    LV E' Septal Velocity 7.41 cm/s    LV ED Vol A2C 96.10 cm3    BP EF 56.8 55.0 - 100.0 %    LV ES Vol A2C 33.40 cm3    E/E' septal 10.66     Pulmonic Regurgitant End Max Velocity 523.00 cm/s    Mitral Valve E Wave Deceleration Time 197.00 ms    MV A Reuben 79.00 cm/s    MV E Reuben 79.00 cm/s    MV E/A 1.00     Pulmonic Regurgitant End Max Velocity 116.00 cm/s    Pulmonic Valve Systolic Peak Instantaneous Gradient 5.00 mmHg    Pulmonic Regurgitant End Max Velocity 119.00 cm/s    Pulmonic Valve Systolic Peak Instantaneous Gradient 6.00 mmHg    P Vein A Dur 102.00 ms    Pulmonary Vein \"A\" Wave Velocity 38.00 cm/s    Est. RA Pressure 3.00 mmHg    Tricuspid Valve Max Velocity 282.00 cm/s    Triscuspid Valve Regurgitation Peak Gradient 32.00 mmHg    RVSP 35.00 mmHg    LV Mass .5 88.0 - 224.0 g    LV Mass AL Index 107.7 49.0 - 115.0 g/m2    Aortic Sinus Valsalva 3.60 cm    Left Atrium Minor Axis 2.12 cm    Left Atrium Major Axis 4.10 cm   CBC WITH AUTOMATED DIFF    Collection Time: 04/02/21  1:18 PM   Result Value Ref Range    WBC 7.9 4.1 - 11.1 K/uL    RBC 3.01 (L) 4.10 - 5.70 M/uL    HGB 8.4 (L) 12.1 - 17.0 g/dL    HCT 27.6 (L) 36.6 - 50.3 %    MCV 91.7 80.0 - 99.0 FL    MCH 27.9 26.0 - 34.0 PG    MCHC 30.4 30.0 - 36.5 g/dL    RDW 19.1 (H) 11.5 - 14.5 %    PLATELET 927 349 - 426 K/uL    MPV 12.2 8.9 - 12.9 FL    NEUTROPHILS 93 (H) 32 - 75 %    LYMPHOCYTES 5 (L) 12 - 49 % MONOCYTES 2 (L) 5 - 13 %    EOSINOPHILS 0 0 - 7 %    BASOPHILS 0 0 - 1 %    IMMATURE GRANULOCYTES 0 0.0 - 0.5 %    ABS. IMM. GRANS. 0 0.00 - 0.04 K/UL    DF AUTOMATED                XR CHEST PORT   Final Result   NG tube position as above. Worsening lung aeration. CT ABD PELV WO CONT   Final Result   No acute abdominopelvic abnormality. Nonobstructing bilateral renal   calculi. Correlate for constipation given increased stool burden. End-stage   degenerative changes of the hips with postoperative change in the right   acetabulum. Pulmonary interstitial abnormality partially visualized in the acute   setting would suggest infection/inflammation or edema versus a combination of   these and in any case with early airspace component at the right lung base. CT HEAD WO CONT   Final Result   No acute intracranial abnormality. 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 Mild   generalized atrophy with nonspecific white matter abnormality that may indicate   chronic small vessel disease. 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 XR CHEST PORT   Final Result   Rotated exam. Diffuse interstitial abnormality and with question of   some superimposed nodular areas. In the acute setting edema, infection or   combination of these is considered however, especially given the possible   nodular opacities, follow-up to radiographic resolution and/or further   evaluation with chest CT if symptoms/findings do not resolve as expected with   treatment, or if indicated otherwise. Assessment:     Active Problems:    Hypovolemia (3/27/2021)      Acute renal failure (ARF) (HCC) (3/27/2021)      Atrial fibrillation, rapid ([facility] [address] (3/27/2021)      JAE (acute kidney injury) ([facility] [address] (3/27/2021)        Plan:   S/p PEG tube placement. May use PEG tube today in 6 hours.  Bolster mild  compression to skin wall, clean wound wth peroxide  and water and apply tripple antibiotic cream twice a  day. Diet per speech Pathology. Dietary consult for  feed formula and volume. Patient discussed with Dr Sy High and agrees to above impression and plan. Thank you for allowing me to participate in this patients care    Signed By: Bc Muller NP     April 2, 2021

## 2021-04-02 NOTE — PROGRESS NOTES
Comprehensive Nutrition Assessment    Type and Reason for Visit: Reassess(goal)    Nutrition Recommendations/Plan:   PO diet of puree/NTL for COMFORT, advance per SLP      Needs good oral care, consider lidocaine vs magic mouthwash to assist      As PEG placed and with GI clearance,initiate TF of Jevity 1.5 at goal of 55ml/hr   Flush with 210ml water q4hrs          Provides 1980kcals, 84g pro, 2263ml fluid (Meets 100% EENs, 133% protein, and 100% fluid needs)  *D5 (75ml/hr) providing 306kcals/d     Please document any PO intakes, initiation of TF, TF rate, flushes, GRV, and BMs  Weekly measured body weight    Nutrition Assessment:  Admitted for dehydration and poor PO, +JAE, likely UTI. RD consulted for poor PO, pt initially inappropriate for interview. D/t poor PO pta and since admit, as well as SLP rec for NPO with alternate source nutrition, RD rec'd NGT placement, completed (3/28). SLP attempted re-eval however pt refusing PO and oral care. TF running x2 days, poor documentation however likely at goal x1 day prior to removal d/t epistaxis. Pt now NPO for PEG placement today, on diet with minimal to no intake x3 days. No changes to tube feed recs start per GI instructions. Remains on D5 providing 306kcals/d. Labs: H/H 8.0/27.0, K+ 3.3, , BG , Corrected Ca 9.56, AST 66, Phos 1.4. Meds: D5 +NS at 75ml/hr, Amiodarone, statin, ceftriaxone, solumedrol, mirtazapine, PRN antiemetics, PRN miralax.      Malnutrition Assessment:  Malnutrition Status:  Severe malnutrition    Context:  Acute illness     Findings of the 6 clinical characteristics of malnutrition:   Energy Intake:  7 - 50% or less of est energy requirements for 5 or more days  Weight Loss:  Unable to assess     Body Fat Loss:  No significant body fat loss,     Muscle Mass Loss:  7 - Moderate muscle mass loss, Temples (temporalis)  Fluid Accumulation:  No significant fluid accumulation,      Estimated Daily Nutrient Needs:  Energy (kcal): 2079kcals (30kcals/kg); Weight Used for Energy Requirements: Current  Protein (g): 83g (1.2g/kg); Weight Used for Protein Requirements: Current  Fluid (ml/day): 2079ml; Method Used for Fluid Requirements: 1 ml/kcal    Nutrition Related Findings:  NFPE finding severe temporal wasting, moderate muscle wasting otherwise. Per hard chart pt on puree/NTL pta, SLP following. No N/V/D/C, last BM 3/31, large and formed. +2 genital edema. Wounds:    Skin tears, Deep tissue injury(DTI R hip; L Knee Erythema; Skin tear/split to distal head of penis)       Current Nutrition Therapies:  DIET TUBE FEEDING Jevity 1.5 20ml/hr, advance by 10ml q8hr to goal of 55ml/hr; Flush with 210ml q4hrs  DIET NPO  Current Tube Feeding (TF) Orders:   · Feeding Route: PEG   · Goal TF & Flush Orders Provides: As PEG placed and with GI clearance, rec'd initiate TF of Jevity 1.5 at goal of 55ml/hr,  Flush with 210ml water q4hrs; Provides 1980kcals, 84g pro, 2263ml fluid      Anthropometric Measures:  · Height:  6' 2.02\" (188 cm)  · Current Body Wt:  69.3 kg (152 lb 12.5 oz)(3/31)   · Admission Body Wt:  125 lb(3/26)    · Usual Body Wt:  (angle)     · Ideal Body Wt:  190 lbs:  80.4 %   · BMI Category:  Underweight (BMI less than 18.5)     Wt hx: 69.3kg (3/31), 56.7kg (3/26- estimated)  No prior wt hx in EMR to assess, pt unable to give wt hx as well.     Nutrition Diagnosis:   · Severe malnutrition related to inadequate protein-energy intake as evidenced by poor intake prior to admission, lab values(indicative of dehydration)      Nutrition Interventions:   Food and/or Nutrient Delivery: Continue current diet, Start tube feeding(PO for comfort per SLP)  Nutrition Education and Counseling: No recommendations at this time, Education not appropriate  Coordination of Nutrition Care: Continue to monitor while inpatient, Feeding assistance/environmental change, Speech therapy    Goals:  intakes >/=50% of EENs in 5 days, wt maintenance while acutely ill within +/-0.5kg in 5 days, resume(?) ability to consume PO in7 days       Nutrition Monitoring and Evaluation:   Behavioral-Environmental Outcomes: None identified  Food/Nutrient Intake Outcomes: Diet advancement/tolerance, Food and nutrient intake, Enteral nutrition intake/tolerance  Physical Signs/Symptoms Outcomes: Biochemical data, Chewing or swallowing, Meal time behavior, Fluid status or edema, Nutrition focused physical findings, Weight, Skin    Discharge Planning:    Enteral nutrition     Electronically signed by Jeanette Palacios RD on 4/2/2021 at 9:05 AM    Contact: Ext [phone], or via [employer]

## 2021-04-02 NOTE — PROGRESS NOTES
General Daily Progress Note          Patient Name:   Paddy Cabot       YOB: 1945       Age:  66 y.o. Admit Date: 3/26/2021      Subjective:     75yom with a history of dementia, stroke, COVID, DVT, CKD presented with lethargy and weakness and was diagnosed with JAE and hyperkalemia. Niece was contacted and agreed to [facility] long term care as discharge plan.  __________________________________________________________        Patient getting PEG tube placed this am and will be on enteral nutrition  Patient rash has mildly improved per nurse    Psychiatry put patient on Mirtazapine disintegrating 15 mg tablet at night and suggests haloperidol 0.5 mg at night for agitation. Dr. Leola Mcleod also suggests getting B12 and folate levels. /62  P 73  T 97.8 F  R 20          Objective:     Visit Vitals  /62 (BP 1 Location: Right upper arm, BP Patient Position: At rest;Supine)   Pulse 73   Temp 97.8 °F (36.6 °C)   Resp 20   Ht 6' 2.02\" (1.88 m)   Wt 152 lb 12.5 oz (69.3 kg)   SpO2 95%   BMI 19.61 kg/m²        Recent Results (from the past 24 hour(s))   GLUCOSE, POC    Collection Time: 04/01/21  4:16 PM   Result Value Ref Range    Glucose (POC) 98 65 - 100 mg/dL    Performed by Ko Gutierrez    GLUCOSE, POC    Collection Time: 04/02/21  7:32 AM   Result Value Ref Range    Glucose (POC) 128 (H) 65 - 100 mg/dL    Performed by Janey Lang            Review of Systems    Unable to obtain      Physical Exam:      Constitutional: Awake, nonverbal mumbles  Head: Normocephalic and atraumatic. Dried blood in mouth and around nares. Eyes: Pupils are equal, round, and reactive to light. EOM are normal.   Cardiovascular: Normal rate, regular rhythm and normal heart sounds. Pulmonary/Chest: Breath sounds normal. No wheezes. No rales. Exhibits no tenderness. Abdominal: Soft. Bowel sounds are normal. There is no abdominal tenderness. There is no rebound and no guarding.    Musculoskeletal: Normal range of motion. Neurological: Contracted leg, unintelligible speech, functional quadriplegic  Skin: wounds on right hip, left medial knee, gluteal cleft     XR CHEST PORT   Final Result   NG tube position as above. Worsening lung aeration. CT ABD PELV WO CONT   Final Result   No acute abdominopelvic abnormality. Nonobstructing bilateral renal   calculi. Correlate for constipation given increased stool burden. End-stage   degenerative changes of the hips with postoperative change in the right   acetabulum. Pulmonary interstitial abnormality partially visualized in the acute   setting would suggest infection/inflammation or edema versus a combination of   these and in any case with early airspace component at the right lung base. CT HEAD WO CONT   Final Result   No acute intracranial abnormality. 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 Mild   generalized atrophy with nonspecific white matter abnormality that may indicate   chronic small vessel disease. 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 XR CHEST PORT   Final Result   Rotated exam. Diffuse interstitial abnormality and with question of   some superimposed nodular areas. In the acute setting edema, infection or   combination of these is considered however, especially given the possible   nodular opacities, follow-up to radiographic resolution and/or further   evaluation with chest CT if symptoms/findings do not resolve as expected with   treatment, or if indicated otherwise.                 Recent Results (from the past 24 hour(s))   GLUCOSE, POC    Collection Time: 04/01/21  4:16 PM   Result Value Ref Range    Glucose (POC) 98 65 - 100 mg/dL    Performed by Olesya Clayton    GLUCOSE, POC    Collection Time: 04/02/21  7:32 AM   Result Value Ref Range    Glucose (POC) 128 (H) 65 - 100 mg/dL    Performed by Dawood Spring        Results     Procedure Component Value Units Date/Time    CULTURE, URINE [[id number]] Collected: 03/27/21 1700    Order Status: Completed Specimen: Urine Updated: 03/29/21 1114     Special Requests: No Special Requests        Culture result: No Growth (<1000 cfu/mL)       COVID-19 RAPID TEST [[id number]] Collected: 03/27/21 0730    Order Status: Completed Specimen: Nasopharyngeal Updated: 03/27/21 0813     Specimen source Nasopharyngeal        COVID-19 rapid test Not Detected        Comment: Rapid Abbott ID Now   Rapid NAAT:  The specimen is NEGATIVE for SARS-CoV-2, the novel coronavirus associated with COVID-19. Negative results should be treated as presumptive and, if inconsistent with clinical signs and symptoms or necessary for patient management, should be tested with an alternative molecular assay. Negative results do not preclude SARS-CoV-2 infection and should not be used as the sole basis for patient management decisions. This test has been authorized by the FDA under   an Emergency Use Authorization (EUA) for use by authorized laboratories.  Fact sheet for Healthcare Providers: ConventionUpdate.co.nz Fact sheet for Patients: ConventionUpdate.co.nz   Methodology: Isothermal Nucleic Acid Amplification         CULTURE, BLOOD, PAIRED [[id number]] Collected: 03/26/21 2045    Order Status: Completed Specimen: Blood Updated: 04/02/21 0723     Special Requests: No Special Requests        Culture result: No growth 6 days              Labs:     Recent Labs     04/01/21 0540 03/31/21 0928   WBC 5.4 5.3   HGB 8.0* 8.5*   HCT 27.0* 28.5*   * 121*     Recent Labs     04/01/21 0540 03/31/21 0928     141 140  142   K 3.3*  3.3* 3.8  3.7   *  112* 112*  112*   CO2 21  22 23  23   BUN 17  16 28*  28*   CREA 0.98  0.99 1.23  1.22   GLU 90  92 82  81   CA 7.9*  7.8* 8.4*  8.2*   PHOS 1.4* 1.4*     Recent Labs     04/01/21 0540 03/31/21  0928   ALT 55 58    122*   TBILI 0.5 0.6   TP 5. 5* 5.8*   ALB 1.7*  1.8* 2.0*  2.0*   GLOB 3.7 3.8     No results for input(s): INR, PTP, APTT, INREXT, INREXT in the last 72 hours. No results for input(s): FE, TIBC, PSAT, FERR in the last 72 hours. No results found for: FOL, RBCF   No results for input(s): PH, PCO2, PO2 in the last 72 hours. No results for input(s): CPK, CKNDX, TROIQ in the last 72 hours.     No lab exists for component: CPKMB  No results found for: CHOL, CHOLX, CHLST, CHOLV, HDL, HDLP, LDL, LDLC, DLDLP, TGLX, TRIGL, TRIGP, CHHD, CHHDX  Lab Results   Component Value Date/Time    Glucose (POC) 128 (H) 04/02/2021 07:32 AM    Glucose (POC) 98 04/01/2021 04:16 PM    Glucose (POC) 87 04/01/2021 11:32 AM    Glucose (POC) 113 (H) 04/01/2021 07:49 AM    Glucose (POC) 115 (H) 03/31/2021 04:01 PM     Lab Results   Component Value Date/Time    Color Yellow 03/27/2021 01:55 AM    Appearance Turbid (A) 03/27/2021 01:55 AM    Specific gravity 1.019 03/27/2021 01:55 AM    pH (UA) 5.0 03/27/2021 01:55 AM    Protein 100 (A) 03/27/2021 01:55 AM    Glucose Negative 03/27/2021 01:55 AM    Ketone Negative 03/27/2021 01:55 AM    Bilirubin Negative 03/27/2021 01:55 AM    Urobilinogen 0.1 03/27/2021 01:55 AM    Nitrites Negative 03/27/2021 01:55 AM    Leukocyte Esterase Large (A) 03/27/2021 01:55 AM    Bacteria Negative 03/27/2021 01:55 AM    Bacteria Negative 03/27/2021 01:55 AM    WBC 20-50 03/27/2021 01:55 AM    WBC 20-50 03/27/2021 01:55 AM    RBC 20-50 03/27/2021 01:55 AM    RBC 20-50 03/27/2021 01:55 AM         Assessment:        A. fib with rapid ventricular rate  Improving with HR in 80s, up to 110 with moving  Heparin discontinued due to bleeding    Acute kidney injury  Improved  Cr 0.98 (8.1 on admission)  BUN 17 (207 on admission)    Acute Blood Loss Anemia secondary to bleeding from nose, mouth  Hgb 8.0, Hct  27  Heparin drip stopped  NG tube removed: on nectar puree  Monitor H&H    Hypokalemia  K 3.3  Monitor    Hypernatremia  141  Corrected    UTI/negative culture    History of recent Covid    History of obstructive uropathy    Dementia    Functional quadriplegic    Dermatitis    History of CVA    History of DVT    Hyperlipidemia          Plan:   Start Solu-Medrol 40 mg IV every 6 hours for the rash  Continue Aspirin, 325 mg, PO QD   Continue Lipitor , 40 mg, PO QD  Continue Ceftriaxone, 1g, IV QD  Continue amiodarone, 400 mg, PO BID  Started on mirtazapine, 15 mg, disintegrating tablet PO, at bedtime  Continue to reposition Q2H     Peg placement today-enteral nutrition    Continue with Nephrology and cardiology consult    Monitor kidney function, electrolyte imbalances  Monitor H&H    Order B12 and folate labs    [facility]        Current Facility-Administered Medications:     potassium phosphate 30 mmol in 0.9% sodium chloride 250 mL infusion, , IntraVENous, ONCE, Ray Simmons MD    mirtazapine (REMERON SOL-TAB) disintegrating tablet 15 mg, 15 mg, Oral, QHS, Tanner Choudhary MD    methylPREDNISolone (PF) (SOLU-MEDROL) injection 40 mg, 40 mg, IntraVENous, Q6H, Carlene Moon MD, 40 mg at 04/02/21 0539    dextrose 5% 1,000 mL with sodium chloride 4 MEQ/ML (23.4%) 34.2 mEq infusion, , IntraVENous, CONTINUOUS, Ray Simmons MD, Last Rate: 75 mL/hr at 04/02/21 0117, New Bag at 04/02/21 0117    amiodarone (CORDARONE) tablet 400 mg, 400 mg, Oral, BID, Sander Saldaña MD, 400 mg at 03/30/21 1731    acetaminophen (TYLENOL) tablet 650 mg, 650 mg, Oral, Q6H PRN, 650 mg at 03/31/21 0308 **OR** acetaminophen (TYLENOL) suppository 650 mg, 650 mg, Rectal, Q6H PRN, Darrius Moon MD    polyethylene glycol (MIRALAX) packet 17 g, 17 g, Oral, DAILY PRN, Carlene Moon MD    ondansetron (ZOFRAN ODT) tablet 4 mg, 4 mg, Oral, Q8H PRN **OR** ondansetron (ZOFRAN) injection 4 mg, 4 mg, IntraVENous, Q6H PRN, Carlene Moon MD    cefTRIAXone (ROCEPHIN) 1 g in sterile water (preservative free) 10 mL IV syringe, 1 g, IntraVENous, Q24H, Brendan Coreas MD, 1 g at 04/01/21 1459    aspirin tablet 325 mg, 325 mg, Oral, DAILY, Carlene Moon MD, 325 mg at 03/29/21 1019    atorvastatin (LIPITOR) tablet 40 mg, 40 mg, Oral, QHS, Carlene Moon MD, 40 mg at 03/30/21 9158

## 2021-04-02 NOTE — CONSULTS
[facility]    Name:  Arron Morris  MR#:  [id number]  :  1945  ACCOUNT #:  [de-identified]  DATE OF SERVICE:  2021    REQUESTING PHYSICIAN:  Mortimer Bateman, MD, Neurology. REASON FOR CONSULTATION:  Lack of cooperation and failure to thrive. HISTORY OF PRESENT ILLNESS:  This is a 78-year-old male patient who was admitted to medical inpatient, failure to thrive, decreased p.o. intake, refusing to take medications, confusion. The patient is nonverbal.  When I approached him, he closed his eyes, winced, and did not say any word even though he moved a little bit, restless. Tried to wake him up and tried to communicate, it was not fruitful. Saw the patient, chart reviewed. This gentleman has extensive history of dementia, stroke, COVID-19 pneumonia, COPD, acute kidney injury, AFib, MRSA, major depression, acute embolic thrombosis, benign prostatic hypertrophy, previous stroke. I could not get much information from the patient, assuming that he has a prior history of depression and living in a nursing home. Most of the information came from observation and chart review. PAST HISTORY:  Unknown, but there is mention about depression, I suspect he received some kind of intervention for depression. ALLERGIES TO MEDICATIONS:  FLONASE. CURRENT MEDICATIONS:  Amiodarone, aspirin 325 mg, Lipitor 40 mg, Rocephin, p.r.n. Heparin, p.r.n. Tylenol, Zofran, MiraLax. SOCIAL HISTORY:  He lives in a nursing home. SUBSTANCE ABUSE HISTORY:  Unknown. I guess given his condition he is not able to imbibe any alcohol, smoke any cigarettes, do any drugs on his own. MENTAL STATUS:  Nothing more to add. DIAGNOSES:  1. Dementia. 2.  Hyperlipidemia. 3.  Cerebrovascular accident. 4.  Failure to thrive. 5.  Strokes. LABORATORY AND DIAGNOSTIC DATA:  TSH 1.43.     CT head on 2021, no acute intracranial abnormality, generalized atrophy with nonspecific white matter abnormality, indicate chronic small vessel disease, probable lipoma or  right occipital lobe. DISPOSITION:  Order B12 and folate levels. Place him on mirtazapine, disintegrating 15 mg at night. Haloperidol 0.5 mg one at night to help reduce confusion and agitation. Thank you for allowing me to participate in the care of this patient.       Mayte John MD      [id number]  D:  04/01/2021 15:42  T:  04/01/2021 19:27  JOB #:  [id number]

## 2021-04-02 NOTE — PROGRESS NOTES
General Daily Progress Note          Patient Name:   Haylie Hayes       YOB: 1945       Age:  66 y.o. Admit Date: 3/26/2021      Subjective:     75yom with a history of dementia, stroke, COVID, DVT, CKD presented with lethargy and weakness and was diagnosed with JAE and hyperkalemia. Niece was contacted and agreed to [facility] long term care as discharge plan.  __________________________________________________________        Patient getting PEG tube placed this am and will be on enteral nutrition  Patient rash has mildly improved per nurse    Psychiatry put patient on Mirtazapine disintegrating 15 mg tablet at night and suggests haloperidol 0.5 mg at night for agitation. Dr. Stephen Zaidi also suggests getting B12 and folate levels.     /62  P 73  T 97.8 F  R 20          Objective:     Visit Vitals  BP (!) 102/58 (BP 1 Location: Right upper arm, BP Patient Position: At rest;Supine)   Pulse 91   Temp 98.1 °F (36.7 °C)   Resp 20   Ht 6' 2.02\" (1.88 m)   Wt 69.1 kg (152 lb 5.4 oz)   SpO2 98%   BMI 19.55 kg/m²        Recent Results (from the past 24 hour(s))   GLUCOSE, POC    Collection Time: 04/02/21  7:32 AM   Result Value Ref Range    Glucose (POC) 128 (H) 65 - 100 mg/dL    Performed by Dawood Valliant    ECHO ADULT COMPLETE    Collection Time: 04/02/21 10:03 AM   Result Value Ref Range    Aortic Regurgitant Pressure Half-time 758.00 ms    AR Max Reuben 339.00 cm/s    Pulmonic Regurgitant End Max Velocity 150.00 cm/s    AoV PG 9.00 mmHg    IVSd 1.05 (A) 0.60 - 1.00 cm    LVIDd 4.58 4.20 - 5.90 cm    LVIDs 3.22 cm    Pulmonic Regurgitant End Max Velocity 139.00 cm/s    LVOT Peak Gradient 8.00 mmHg    LVPWd 1.39 (A) 0.60 - 1.00 cm    LV E' Septal Velocity 7.41 cm/s    LV ED Vol A2C 96.10 cm3    BP EF 56.8 55.0 - 100.0 %    LV ES Vol A2C 33.40 cm3    E/E' septal 10.66     Pulmonic Regurgitant End Max Velocity 523.00 cm/s    Mitral Valve E Wave Deceleration Time 197.00 ms    MV A Reuben 79.00 cm/s    MV E Reuben 79.00 cm/s    MV E/A 1.00     Pulmonic Regurgitant End Max Velocity 116.00 cm/s    Pulmonic Valve Systolic Peak Instantaneous Gradient 5.00 mmHg    Pulmonic Regurgitant End Max Velocity 119.00 cm/s    Pulmonic Valve Systolic Peak Instantaneous Gradient 6.00 mmHg    P Vein A Dur 102.00 ms    Pulmonary Vein \"A\" Wave Velocity 38.00 cm/s    Est. RA Pressure 3.00 mmHg    Tricuspid Valve Max Velocity 282.00 cm/s    Triscuspid Valve Regurgitation Peak Gradient 32.00 mmHg    RVSP 35.00 mmHg    LV Mass .5 88.0 - 224.0 g    LV Mass AL Index 107.7 49.0 - 115.0 g/m2    Aortic Sinus Valsalva 3.60 cm    Left Atrium Minor Axis 2.12 cm    Left Atrium Major Axis 4.10 cm   CBC WITH AUTOMATED DIFF    Collection Time: 04/02/21  1:18 PM   Result Value Ref Range    WBC 7.9 4.1 - 11.1 K/uL    RBC 3.01 (L) 4.10 - 5.70 M/uL    HGB 8.4 (L) 12.1 - 17.0 g/dL    HCT 27.6 (L) 36.6 - 50.3 %    MCV 91.7 80.0 - 99.0 FL    MCH 27.9 26.0 - 34.0 PG    MCHC 30.4 30.0 - 36.5 g/dL    RDW 19.1 (H) 11.5 - 14.5 %    PLATELET 614 052 - 084 K/uL    MPV 12.2 8.9 - 12.9 FL    NEUTROPHILS 93 (H) 32 - 75 %    LYMPHOCYTES 5 (L) 12 - 49 %    MONOCYTES 2 (L) 5 - 13 %    EOSINOPHILS 0 0 - 7 %    BASOPHILS 0 0 - 1 %    IMMATURE GRANULOCYTES 0 0.0 - 0.5 %    ABS. IMM. GRANS. 0 0.00 - 0.04 K/UL    DF AUTOMATED     METABOLIC PANEL, COMPREHENSIVE    Collection Time: 04/02/21  1:18 PM   Result Value Ref Range    Sodium 139 136 - 145 mmol/L    Potassium 3.6 3.5 - 5.1 mmol/L    Chloride 109 (H) 97 - 108 mmol/L    CO2 22 21 - 32 mmol/L    Anion gap 8 5 - 15 mmol/L    Glucose 119 (H) 65 - 100 mg/dL    BUN 11 6 - 20 mg/dL    Creatinine 0.86 0.70 - 1.30 mg/dL    BUN/Creatinine ratio 13 12 - 20      GFR est AA >60 >60 ml/min/1.73m2    GFR est non-AA >60 >60 ml/min/1.73m2    Calcium 7.9 (L) 8.5 - 10.1 mg/dL    Bilirubin, total 0.4 0.2 - 1.0 mg/dL    AST (SGOT) 33 15 - 37 U/L    ALT (SGPT) 53 12 - 78 U/L    Alk.  phosphatase 122 (H) 45 - 117 U/L    Protein, total 5.9 (L) 6.4 - 8.2 g/dL    Albumin 1.9 (L) 3.5 - 5.0 g/dL    Globulin 4.0 2.0 - 4.0 g/dL    A-G Ratio 0.5 (L) 1.1 - 2.2     RENAL FUNCTION PANEL    Collection Time: 04/02/21  1:18 PM   Result Value Ref Range    Sodium 139 136 - 145 mmol/L    Potassium 3.6 3.5 - 5.1 mmol/L    Chloride 108 97 - 108 mmol/L    CO2 22 21 - 32 mmol/L    Anion gap 9 5 - 15 mmol/L    Glucose 117 (H) 65 - 100 mg/dL    BUN 12 6 - 20 mg/dL    Creatinine 0.85 0.70 - 1.30 mg/dL    BUN/Creatinine ratio 14 12 - 20      GFR est AA >60 >60 ml/min/1.73m2    GFR est non-AA >60 >60 ml/min/1.73m2    Calcium 7.9 (L) 8.5 - 10.1 mg/dL    Phosphorus 2.1 (L) 2.6 - 4.7 mg/dL    Albumin 1.9 (L) 3.5 - 5.0 g/dL   GLUCOSE, POC    Collection Time: 04/02/21  3:41 PM   Result Value Ref Range    Glucose (POC) 122 (H) 65 - 100 mg/dL    Performed by Feli Huerta            Review of Systems    Unable to obtain      Physical Exam:      Constitutional: Awake, nonverbal mumbles  Head: Normocephalic and atraumatic. Dried blood in mouth and around nares. Eyes: Pupils are equal, round, and reactive to light. EOM are normal.   Cardiovascular: Normal rate, regular rhythm and normal heart sounds. Pulmonary/Chest: Breath sounds normal. No wheezes. No rales. Exhibits no tenderness. Abdominal: Soft. Bowel sounds are normal. There is no abdominal tenderness. There is no rebound and no guarding. Musculoskeletal: Normal range of motion. Neurological: Contracted leg, unintelligible speech, functional quadriplegic  Skin: wounds on right hip, left medial knee, gluteal cleft     XR CHEST PORT   Final Result   NG tube position as above. Worsening lung aeration. CT ABD PELV WO CONT   Final Result   No acute abdominopelvic abnormality. Nonobstructing bilateral renal   calculi. Correlate for constipation given increased stool burden. End-stage   degenerative changes of the hips with postoperative change in the right   acetabulum.  Pulmonary interstitial abnormality partially visualized in the acute   setting would suggest infection/inflammation or edema versus a combination of   these and in any case with early airspace component at the right lung base. CT HEAD WO CONT   Final Result   No acute intracranial abnormality. 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 Mild   generalized atrophy with nonspecific white matter abnormality that may indicate   chronic small vessel disease. 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 XR CHEST PORT   Final Result   Rotated exam. Diffuse interstitial abnormality and with question of   some superimposed nodular areas. In the acute setting edema, infection or   combination of these is considered however, especially given the possible   nodular opacities, follow-up to radiographic resolution and/or further   evaluation with chest CT if symptoms/findings do not resolve as expected with   treatment, or if indicated otherwise.                 Recent Results (from the past 24 hour(s))   GLUCOSE, POC    Collection Time: 04/02/21  7:32 AM   Result Value Ref Range    Glucose (POC) 128 (H) 65 - 100 mg/dL    Performed by Jesus Rosa    ECHO ADULT COMPLETE    Collection Time: 04/02/21 10:03 AM   Result Value Ref Range    Aortic Regurgitant Pressure Half-time 758.00 ms    AR Max Reuben 339.00 cm/s    Pulmonic Regurgitant End Max Velocity 150.00 cm/s    AoV PG 9.00 mmHg    IVSd 1.05 (A) 0.60 - 1.00 cm    LVIDd 4.58 4.20 - 5.90 cm    LVIDs 3.22 cm    Pulmonic Regurgitant End Max Velocity 139.00 cm/s    LVOT Peak Gradient 8.00 mmHg    LVPWd 1.39 (A) 0.60 - 1.00 cm    LV E' Septal Velocity 7.41 cm/s    LV ED Vol A2C 96.10 cm3    BP EF 56.8 55.0 - 100.0 %    LV ES Vol A2C 33.40 cm3    E/E' septal 10.66     Pulmonic Regurgitant End Max Velocity 523.00 cm/s    Mitral Valve E Wave Deceleration Time 197.00 ms    MV A Reuben 79.00 cm/s    MV E Reuben 79.00 cm/s    MV E/A 1.00     Pulmonic Regurgitant End Max Velocity 116.00 cm/s    Pulmonic Valve Systolic Peak Instantaneous Gradient 5.00 mmHg    Pulmonic Regurgitant End Max Velocity 119.00 cm/s    Pulmonic Valve Systolic Peak Instantaneous Gradient 6.00 mmHg    P Vein A Dur 102.00 ms    Pulmonary Vein \"A\" Wave Velocity 38.00 cm/s    Est. RA Pressure 3.00 mmHg    Tricuspid Valve Max Velocity 282.00 cm/s    Triscuspid Valve Regurgitation Peak Gradient 32.00 mmHg    RVSP 35.00 mmHg    LV Mass .5 88.0 - 224.0 g    LV Mass AL Index 107.7 49.0 - 115.0 g/m2    Aortic Sinus Valsalva 3.60 cm    Left Atrium Minor Axis 2.12 cm    Left Atrium Major Axis 4.10 cm   CBC WITH AUTOMATED DIFF    Collection Time: 04/02/21  1:18 PM   Result Value Ref Range    WBC 7.9 4.1 - 11.1 K/uL    RBC 3.01 (L) 4.10 - 5.70 M/uL    HGB 8.4 (L) 12.1 - 17.0 g/dL    HCT 27.6 (L) 36.6 - 50.3 %    MCV 91.7 80.0 - 99.0 FL    MCH 27.9 26.0 - 34.0 PG    MCHC 30.4 30.0 - 36.5 g/dL    RDW 19.1 (H) 11.5 - 14.5 %    PLATELET 054 524 - 218 K/uL    MPV 12.2 8.9 - 12.9 FL    NEUTROPHILS 93 (H) 32 - 75 %    LYMPHOCYTES 5 (L) 12 - 49 %    MONOCYTES 2 (L) 5 - 13 %    EOSINOPHILS 0 0 - 7 %    BASOPHILS 0 0 - 1 %    IMMATURE GRANULOCYTES 0 0.0 - 0.5 %    ABS. IMM. GRANS. 0 0.00 - 0.04 K/UL    DF AUTOMATED     METABOLIC PANEL, COMPREHENSIVE    Collection Time: 04/02/21  1:18 PM   Result Value Ref Range    Sodium 139 136 - 145 mmol/L    Potassium 3.6 3.5 - 5.1 mmol/L    Chloride 109 (H) 97 - 108 mmol/L    CO2 22 21 - 32 mmol/L    Anion gap 8 5 - 15 mmol/L    Glucose 119 (H) 65 - 100 mg/dL    BUN 11 6 - 20 mg/dL    Creatinine 0.86 0.70 - 1.30 mg/dL    BUN/Creatinine ratio 13 12 - 20      GFR est AA >60 >60 ml/min/1.73m2    GFR est non-AA >60 >60 ml/min/1.73m2    Calcium 7.9 (L) 8.5 - 10.1 mg/dL    Bilirubin, total 0.4 0.2 - 1.0 mg/dL    AST (SGOT) 33 15 - 37 U/L    ALT (SGPT) 53 12 - 78 U/L    Alk.  phosphatase 122 (H) 45 - 117 U/L    Protein, total 5.9 (L) 6.4 - 8.2 g/dL    Albumin 1.9 (L) 3.5 - 5.0 g/dL    Globulin 4.0 2.0 - 4.0 g/dL    A-G Ratio 0.5 (L) 1.1 - 2.2     RENAL FUNCTION PANEL    Collection Time: 04/02/21  1:18 PM   Result Value Ref Range    Sodium 139 136 - 145 mmol/L    Potassium 3.6 3.5 - 5.1 mmol/L    Chloride 108 97 - 108 mmol/L    CO2 22 21 - 32 mmol/L    Anion gap 9 5 - 15 mmol/L    Glucose 117 (H) 65 - 100 mg/dL    BUN 12 6 - 20 mg/dL    Creatinine 0.85 0.70 - 1.30 mg/dL    BUN/Creatinine ratio 14 12 - 20      GFR est AA >60 >60 ml/min/1.73m2    GFR est non-AA >60 >60 ml/min/1.73m2    Calcium 7.9 (L) 8.5 - 10.1 mg/dL    Phosphorus 2.1 (L) 2.6 - 4.7 mg/dL    Albumin 1.9 (L) 3.5 - 5.0 g/dL   GLUCOSE, POC    Collection Time: 04/02/21  3:41 PM   Result Value Ref Range    Glucose (POC) 122 (H) 65 - 100 mg/dL    Performed by Janey Lang        Results     Procedure Component Value Units Date/Time    CULTURE, URINE [[id number]] Collected: 03/27/21 1700    Order Status: Completed Specimen: Urine Updated: 03/29/21 1114     Special Requests: No Special Requests        Culture result: No Growth (<1000 cfu/mL)       COVID-19 RAPID TEST [[id number]] Collected: 03/27/21 0730    Order Status: Completed Specimen: Nasopharyngeal Updated: 03/27/21 0813     Specimen source Nasopharyngeal        COVID-19 rapid test Not Detected        Comment: Rapid Abbott ID Now   Rapid NAAT:  The specimen is NEGATIVE for SARS-CoV-2, the novel coronavirus associated with COVID-19. Negative results should be treated as presumptive and, if inconsistent with clinical signs and symptoms or necessary for patient management, should be tested with an alternative molecular assay. Negative results do not preclude SARS-CoV-2 infection and should not be used as the sole basis for patient management decisions. This test has been authorized by the FDA under   an Emergency Use Authorization (EUA) for use by authorized laboratories.  Fact sheet for Healthcare Providers: [employer] Fact sheet for Patients: [facility]   Methodology: Isothermal Nucleic Acid Amplification         CULTURE, BLOOD, PAIRED [[id number]] Collected: 03/26/21 2045    Order Status: Completed Specimen: Blood Updated: 04/02/21 0723     Special Requests: No Special Requests        Culture result: No growth 6 days              Labs:     Recent Labs     04/02/21 1318 04/01/21  0540   WBC 7.9 5.4   HGB 8.4* 8.0*   HCT 27.6* 27.0*    121*     Recent Labs     04/02/21 1318 04/01/21  0540 03/31/21  0928     139 141  141 140  142   K 3.6  3.6 3.3*  3.3* 3.8  3.7     109* 112*  112* 112*  112*   CO2 22  22 21  22 23  23   BUN 12  11 17  16 28*  28*   CREA 0.85  0.86 0.98  0.99 1.23  1.22   *  119* 90  92 82  81   CA 7.9*  7.9* 7.9*  7.8* 8.4*  8.2*   PHOS 2.1* 1.4* 1.4*     Recent Labs     04/02/21 1318 04/01/21  0540 03/31/21  0928   ALT 53 55 58   * 115 122*   TBILI 0.4 0.5 0.6   TP 5.9* 5.5* 5.8*   ALB 1.9*  1.9* 1.7*  1.8* 2.0*  2.0*   GLOB 4.0 3.7 3.8     No results for input(s): INR, PTP, APTT, INREXT, INREXT in the last 72 hours. No results for input(s): FE, TIBC, PSAT, FERR in the last 72 hours. Lab Results   Component Value Date/Time    Folate 14.4 04/01/2021 03:45 PM      No results for input(s): PH, PCO2, PO2 in the last 72 hours. No results for input(s): CPK, CKNDX, TROIQ in the last 72 hours.     No lab exists for component: CPKMB  No results found for: CHOL, CHOLX, CHLST, CHOLV, HDL, HDLP, LDL, LDLC, DLDLP, TGLX, TRIGL, TRIGP, CHHD, CHHDX  Lab Results   Component Value Date/Time    Glucose (POC) 122 (H) 04/02/2021 03:41 PM    Glucose (POC) 128 (H) 04/02/2021 07:32 AM    Glucose (POC) 98 04/01/2021 04:16 PM    Glucose (POC) 87 04/01/2021 11:32 AM    Glucose (POC) 113 (H) 04/01/2021 07:49 AM     Lab Results   Component Value Date/Time    Color Yellow 03/27/2021 01:55 AM    Appearance Turbid (A) 03/27/2021 01:55 AM    Specific gravity 1.019 03/27/2021 01:55 AM    pH (UA) 5.0 03/27/2021 01:55 AM    Protein 100 (A) 03/27/2021 01:55 AM    Glucose Negative 03/27/2021 01:55 AM    Ketone Negative 03/27/2021 01:55 AM    Bilirubin Negative 03/27/2021 01:55 AM    Urobilinogen 0.1 03/27/2021 01:55 AM    Nitrites Negative 03/27/2021 01:55 AM    Leukocyte Esterase Large (A) 03/27/2021 01:55 AM    Bacteria Negative 03/27/2021 01:55 AM    Bacteria Negative 03/27/2021 01:55 AM    WBC 20-50 03/27/2021 01:55 AM    WBC 20-50 03/27/2021 01:55 AM    RBC 20-50 03/27/2021 01:55 AM    RBC 20-50 03/27/2021 01:55 AM         Assessment:        A. fib with rapid ventricular rate  Improving with HR in 80s, up to 110 with moving  Heparin discontinued due to bleeding    Acute kidney injury  Improved  Cr 0.98 (8.1 on admission)  BUN 17 (207 on admission)    Acute Blood Loss Anemia secondary to bleeding from nose, mouth  Hgb 8.4, Hct  27  Heparin drip stopped  Monitor H&H    Hypokalemia  Replace     Hypernatremia  141  Corrected    UTI/negative culture    History of recent Covid    History of obstructive uropathy    Dementia    Functional quadriplegic    Dermatitis    History of CVA    History of DVT    Hyperlipidemia  Protein calorie malnutrition moderate          Plan:   Change Solu-Medrol twice a day  Continue Aspirin, 325 mg, PO QD   Continue Lipitor , 40 mg, PO QD  Continue Ceftriaxone, 1g, IV QD  Continue amiodarone, 400 mg, PO BID  Started on mirtazapine, 15 mg, disintegrating tablet PO, at bedtime  Continue to reposition Q2H     Peg placement today-enteral nutrition    Continue with Nephrology and cardiology consult    Monitor kidney function, electrolyte imbalances  Monitor H&H    Order B12 and folate labs    [facility]        Current Facility-Administered Medications:     potassium phosphate 30 mmol in 0.9% sodium chloride 250 mL infusion, , IntraVENous, ONCE, Ray Simmons MD    mirtazapine (REMERON SOL-TAB) disintegrating tablet 15 mg, 15 mg, Oral, QHS, Alyssa Choudhary MD    methylPREDNISolone (PF) (SOLU-MEDROL) injection 40 mg, 40 mg, IntraVENous, Q6H, Carlene Moon MD, 40 mg at 04/02/21 1409    dextrose 5% 1,000 mL with sodium chloride 4 MEQ/ML (23.4%) 34.2 mEq infusion, , IntraVENous, CONTINUOUS, Ray Simmons MD, Last Rate: 75 mL/hr at 04/02/21 1416, Restarted at 04/02/21 1416    amiodarone (CORDARONE) tablet 400 mg, 400 mg, Oral, BID, Viviana Bo MD, 400 mg at 03/30/21 1731    acetaminophen (TYLENOL) tablet 650 mg, 650 mg, Oral, Q6H PRN, 650 mg at 03/31/21 0308 **OR** acetaminophen (TYLENOL) suppository 650 mg, 650 mg, Rectal, Q6H PRN, Ashish Moon MD    polyethylene glycol (MIRALAX) packet 17 g, 17 g, Oral, DAILY PRN, Ashish Moon MD    ondansetron (ZOFRAN ODT) tablet 4 mg, 4 mg, Oral, Q8H PRN **OR** ondansetron (ZOFRAN) injection 4 mg, 4 mg, IntraVENous, Q6H PRN, Carlene Moon MD    cefTRIAXone (ROCEPHIN) 1 g in sterile water (preservative free) 10 mL IV syringe, 1 g, IntraVENous, Q24H, Brendan Coreas MD, 1 g at 04/02/21 1409    aspirin tablet 325 mg, 325 mg, Oral, DAILY, Carlene Moon MD, 325 mg at 03/29/21 1019    atorvastatin (LIPITOR) tablet 40 mg, 40 mg, Oral, QHS, Carlene Moon MD, 40 mg at 03/30/21 1020

## 2021-04-02 NOTE — PROGRESS NOTES
General Daily Progress Note          Patient Name:   Rose Dunn       YOB: 1945       Age:  66 y.o. Admit Date: 3/26/2021      Subjective:     75yom with a history of dementia, stroke, COVID, DVT, CKD presented with lethargy and weakness and was diagnosed with JAE and hyperkalemia. Niece was contacted and agreed to [facility] long term care as discharge plan.  ____________________________________________________________    Today, patient seen resting in bed and nonconversant (baseline). Hgb 8.0, Hct  27. Will get GI consult for PEG tube, patient not eating. Of note, nurses found right sided pressure ulcer, will ensure repositioning every 2 hours. Potassium 3.3 today. Patient rash has improved    Patient getting PEG tube placed this am and will be on enteral nutrition    Psychiatry put patient on Mirtazapine disintegrating 15 mg tablet at night and suggests haloperidol 0.5 mg at night for agitation. Dr. Clemente Warren also suggests getting B12 and folate levels. /62  P 73  T 97.8 F  R 20          Objective:     Visit Vitals  /62 (BP 1 Location: Right upper arm, BP Patient Position: At rest;Supine)   Pulse 73   Temp 97.8 °F (36.6 °C)   Resp 20   Ht 6' 2.02\" (1.88 m)   Wt 152 lb 12.5 oz (69.3 kg)   SpO2 95%   BMI 19.61 kg/m²        Recent Results (from the past 24 hour(s))   GLUCOSE, POC    Collection Time: 04/01/21 11:32 AM   Result Value Ref Range    Glucose (POC) 87 65 - 100 mg/dL    Performed by Milton Henning    GLUCOSE, POC    Collection Time: 04/01/21  4:16 PM   Result Value Ref Range    Glucose (POC) 98 65 - 100 mg/dL    Performed by Milton Henning    GLUCOSE, POC    Collection Time: 04/02/21  7:32 AM   Result Value Ref Range    Glucose (POC) 128 (H) 65 - 100 mg/dL    Performed by Feli Huerta            Review of Systems    Unable to obtain      Physical Exam:      Constitutional: Awake, nonverbal mumbles  Head: Normocephalic and atraumatic.  Dried blood in mouth and around nares. Eyes: Pupils are equal, round, and reactive to light. EOM are normal.   Cardiovascular: Normal rate, regular rhythm and normal heart sounds. Pulmonary/Chest: Breath sounds normal. No wheezes. No rales. Exhibits no tenderness. Abdominal: Soft. Bowel sounds are normal. There is no abdominal tenderness. There is no rebound and no guarding. Musculoskeletal: Normal range of motion. Neurological: Contracted leg, unintelligible speech, functional quadriplegic  Skin: wounds on right hip, left medial knee, gluteal cleft     XR CHEST PORT   Final Result   NG tube position as above. Worsening lung aeration. CT ABD PELV WO CONT   Final Result   No acute abdominopelvic abnormality. Nonobstructing bilateral renal   calculi. Correlate for constipation given increased stool burden. End-stage   degenerative changes of the hips with postoperative change in the right   acetabulum. Pulmonary interstitial abnormality partially visualized in the acute   setting would suggest infection/inflammation or edema versus a combination of   these and in any case with early airspace component at the right lung base. CT HEAD WO CONT   Final Result   No acute intracranial abnormality. 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 Mild   generalized atrophy with nonspecific white matter abnormality that may indicate   chronic small vessel disease. 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 XR CHEST PORT   Final Result   Rotated exam. Diffuse interstitial abnormality and with question of   some superimposed nodular areas. In the acute setting edema, infection or   combination of these is considered however, especially given the possible   nodular opacities, follow-up to radiographic resolution and/or further   evaluation with chest CT if symptoms/findings do not resolve as expected with   treatment, or if indicated otherwise. Recent Results (from the past 24 hour(s))   GLUCOSE, POC    Collection Time: 04/01/21 11:32 AM   Result Value Ref Range    Glucose (POC) 87 65 - 100 mg/dL    Performed by Iván Sandhu    GLUCOSE, POC    Collection Time: 04/01/21  4:16 PM   Result Value Ref Range    Glucose (POC) 98 65 - 100 mg/dL    Performed by Iván Sandhu    GLUCOSE, POC    Collection Time: 04/02/21  7:32 AM   Result Value Ref Range    Glucose (POC) 128 (H) 65 - 100 mg/dL    Performed by Darrius Rosario        Results     Procedure Component Value Units Date/Time    CULTURE, URINE [[id number]] Collected: 03/27/21 1700    Order Status: Completed Specimen: Urine Updated: 03/29/21 1114     Special Requests: No Special Requests        Culture result: No Growth (<1000 cfu/mL)       COVID-19 RAPID TEST [[id number]] Collected: 03/27/21 0730    Order Status: Completed Specimen: Nasopharyngeal Updated: 03/27/21 0813     Specimen source Nasopharyngeal        COVID-19 rapid test Not Detected        Comment: Rapid Abbott ID Now   Rapid NAAT:  The specimen is NEGATIVE for SARS-CoV-2, the novel coronavirus associated with COVID-19. Negative results should be treated as presumptive and, if inconsistent with clinical signs and symptoms or necessary for patient management, should be tested with an alternative molecular assay. Negative results do not preclude SARS-CoV-2 infection and should not be used as the sole basis for patient management decisions. This test has been authorized by the FDA under   an Emergency Use Authorization (EUA) for use by authorized laboratories.  Fact sheet for Healthcare Providers: ConventionUpdate.co.nz Fact sheet for Patients: ConventionUpdate.co.nz   Methodology: Isothermal Nucleic Acid Amplification         CULTURE, BLOOD, PAIRED [[id number]] Collected: 03/26/21 2045    Order Status: Completed Specimen: Blood Updated: 04/02/21 0723     Special Requests: No Special Requests Culture result: No growth 6 days              Labs:     Recent Labs     04/01/21  0540 03/31/21  0928   WBC 5.4 5.3   HGB 8.0* 8.5*   HCT 27.0* 28.5*   * 121*     Recent Labs     04/01/21  0540 03/31/21  0928     141 140  142   K 3.3*  3.3* 3.8  3.7   *  112* 112*  112*   CO2 21  22 23  23   BUN 17  16 28*  28*   CREA 0.98  0.99 1.23  1.22   GLU 90  92 82  81   CA 7.9*  7.8* 8.4*  8.2*   PHOS 1.4* 1.4*     Recent Labs     04/01/21 0540 03/31/21 0928   ALT 55 58    122*   TBILI 0.5 0.6   TP 5.5* 5.8*   ALB 1.7*  1.8* 2.0*  2.0*   GLOB 3.7 3.8     No results for input(s): INR, PTP, APTT, INREXT, INREXT in the last 72 hours. No results for input(s): FE, TIBC, PSAT, FERR in the last 72 hours. No results found for: FOL, RBCF   No results for input(s): PH, PCO2, PO2 in the last 72 hours. No results for input(s): CPK, CKNDX, TROIQ in the last 72 hours.     No lab exists for component: CPKMB  No results found for: CHOL, CHOLX, CHLST, CHOLV, HDL, HDLP, LDL, LDLC, DLDLP, TGLX, TRIGL, TRIGP, CHHD, CHHDX  Lab Results   Component Value Date/Time    Glucose (POC) 128 (H) 04/02/2021 07:32 AM    Glucose (POC) 98 04/01/2021 04:16 PM    Glucose (POC) 87 04/01/2021 11:32 AM    Glucose (POC) 113 (H) 04/01/2021 07:49 AM    Glucose (POC) 115 (H) 03/31/2021 04:01 PM     Lab Results   Component Value Date/Time    Color Yellow 03/27/2021 01:55 AM    Appearance Turbid (A) 03/27/2021 01:55 AM    Specific gravity 1.019 03/27/2021 01:55 AM    pH (UA) 5.0 03/27/2021 01:55 AM    Protein 100 (A) 03/27/2021 01:55 AM    Glucose Negative 03/27/2021 01:55 AM    Ketone Negative 03/27/2021 01:55 AM    Bilirubin Negative 03/27/2021 01:55 AM    Urobilinogen 0.1 03/27/2021 01:55 AM    Nitrites Negative 03/27/2021 01:55 AM    Leukocyte Esterase Large (A) 03/27/2021 01:55 AM    Bacteria Negative 03/27/2021 01:55 AM    Bacteria Negative 03/27/2021 01:55 AM    WBC 20-50 03/27/2021 01:55 AM    WBC 20-50 03/27/2021 01:55 AM    RBC 20-50 03/27/2021 01:55 AM    RBC 20-50 03/27/2021 01:55 AM         Assessment:        A. fib with rapid ventricular rate  Improving with HR in 80s, up to 110 with moving  Heparin discontinued due to bleeding    Acute kidney injury  Improved  Cr 0.98 (8.1 on admission)  BUN 17 (207 on admission)    Acute Blood Loss Anemia secondary to bleeding from nose, mouth  Hgb 8.0, Hct  27  Heparin drip stopped  NG tube removed: on nectar puree  Monitor H&H    Hypokalemia  K 3.3  Monitor    Hypernatremia  141  Corrected    UTI/negative culture    History of recent Covid    History of obstructive uropathy    Dementia    Functional quadriplegic    Dermatitis    History of CVA    History of DVT    Hyperlipidemia          Plan:   Start Solu-Medrol 40 mg IV every 6 hours for the rash  Continue Aspirin, 325 mg, PO QD   Continue Lipitor , 40 mg, PO QD  Continue Ceftriaxone, 1g, IV QD  Continue amiodarone, 400 mg, PO BID  Started on mirtazapine, 15 mg, disintegrating tablet PO, at bedtime  Continue to reposition Q2H     Peg placement today-enteral nutrition    Continue with Nephrology and cardiology consult    Monitor kidney function, electrolyte imbalances  Monitor H&H    Order B12 and folate labs    D/C plan: [facility] when stable        Current Facility-Administered Medications:     mirtazapine (REMERON SOL-TAB) disintegrating tablet 15 mg, 15 mg, Oral, QHS, Tanner Choudhary MD    methylPREDNISolone (PF) (SOLU-MEDROL) injection 40 mg, 40 mg, IntraVENous, Q6H, Carlene Moon MD, 40 mg at 04/02/21 0539    dextrose 5% 1,000 mL with sodium chloride 4 MEQ/ML (23.4%) 34.2 mEq infusion, , IntraVENous, CONTINUOUS, Ray Simmons MD, Last Rate: 75 mL/hr at 04/02/21 0117, New Bag at 04/02/21 0117    amiodarone (CORDARONE) tablet 400 mg, 400 mg, Oral, BID, Janna Arriaza MD, 400 mg at 03/30/21 1731    acetaminophen (TYLENOL) tablet 650 mg, 650 mg, Oral, Q6H PRN, 650 mg at 03/31/21 0308 **OR** acetaminophen (TYLENOL) suppository 650 mg, 650 mg, Rectal, Q6H PRN, Marcia Moon MD    polyethylene glycol (MIRALAX) packet 17 g, 17 g, Oral, DAILY PRN, Marcia Moon MD    ondansetron (ZOFRAN ODT) tablet 4 mg, 4 mg, Oral, Q8H PRN **OR** ondansetron (ZOFRAN) injection 4 mg, 4 mg, IntraVENous, Q6H PRN, Carlene Moon MD    cefTRIAXone (ROCEPHIN) 1 g in sterile water (preservative free) 10 mL IV syringe, 1 g, IntraVENous, Q24H, Brendan Coreas MD, 1 g at 04/01/21 1459    aspirin tablet 325 mg, 325 mg, Oral, DAILY, Carlene Moon MD, 325 mg at 03/29/21 1019    atorvastatin (LIPITOR) tablet 40 mg, 40 mg, Oral, QHS, Carlene Moon MD, 40 mg at 03/30/21 6910

## 2021-04-03 LAB
ALBUMIN SERPL-MCNC: 1.9 G/DL (ref 3.5–5)
ANION GAP SERPL CALC-SCNC: 9 MMOL/L (ref 5–15)
ATRIAL RATE: 77 BPM
BUN SERPL-MCNC: 14 MG/DL (ref 6–20)
BUN/CREAT SERPL: 14 (ref 12–20)
CA-I BLD-MCNC: 7.8 MG/DL (ref 8.5–10.1)
CALCULATED P AXIS, ECG09: 57 DEGREES
CALCULATED R AXIS, ECG10: 0 DEGREES
CALCULATED T AXIS, ECG11: 34 DEGREES
CHLORIDE SERPL-SCNC: 106 MMOL/L (ref 97–108)
CO2 SERPL-SCNC: 23 MMOL/L (ref 21–32)
CREAT SERPL-MCNC: 1.02 MG/DL (ref 0.7–1.3)
DIAGNOSIS, 93000: NORMAL
GLUCOSE SERPL-MCNC: 153 MG/DL (ref 65–100)
P-R INTERVAL, ECG05: 164 MS
PHOSPHATE SERPL-MCNC: 1.5 MG/DL (ref 2.6–4.7)
POTASSIUM SERPL-SCNC: 3.4 MMOL/L (ref 3.5–5.1)
PROLACTIN SERPL-MCNC: 4.5 NG/ML
Q-T INTERVAL, ECG07: 414 MS
QRS DURATION, ECG06: 100 MS
QTC CALCULATION (BEZET), ECG08: 468 MS
SODIUM SERPL-SCNC: 138 MMOL/L (ref 136–145)
VENTRICULAR RATE, ECG03: 77 BPM

## 2021-04-03 PROCEDURE — 93005 ELECTROCARDIOGRAM TRACING: CPT

## 2021-04-03 PROCEDURE — 74011250637 HC RX REV CODE- 250/637: Performed by: INTERNAL MEDICINE

## 2021-04-03 PROCEDURE — 80069 RENAL FUNCTION PANEL: CPT

## 2021-04-03 PROCEDURE — 74011250636 HC RX REV CODE- 250/636: Performed by: FAMILY MEDICINE

## 2021-04-03 PROCEDURE — 74011000250 HC RX REV CODE- 250: Performed by: INTERNAL MEDICINE

## 2021-04-03 PROCEDURE — 65270000029 HC RM PRIVATE

## 2021-04-03 PROCEDURE — 74011250637 HC RX REV CODE- 250/637: Performed by: FAMILY MEDICINE

## 2021-04-03 PROCEDURE — 74011250637 HC RX REV CODE- 250/637: Performed by: PSYCHIATRY & NEUROLOGY

## 2021-04-03 PROCEDURE — 74011250636 HC RX REV CODE- 250/636: Performed by: INTERNAL MEDICINE

## 2021-04-03 PROCEDURE — 36415 COLL VENOUS BLD VENIPUNCTURE: CPT

## 2021-04-03 RX ORDER — POTASSIUM CHLORIDE 1.5 G/1.77G
40 POWDER, FOR SOLUTION ORAL
Status: ACTIVE | OUTPATIENT
Start: 2021-04-03 | End: 2021-04-04

## 2021-04-03 RX ORDER — AMIODARONE HYDROCHLORIDE 200 MG/1
200 TABLET ORAL DAILY
Status: DISCONTINUED | OUTPATIENT
Start: 2021-04-04 | End: 2021-04-08 | Stop reason: HOSPADM

## 2021-04-03 RX ORDER — ATORVASTATIN CALCIUM 40 MG/1
40 TABLET, FILM COATED ORAL
Qty: 30 TAB | Refills: 0 | Status: SHIPPED | OUTPATIENT
Start: 2021-04-03

## 2021-04-03 RX ORDER — AMIODARONE HYDROCHLORIDE 200 MG/1
200 TABLET ORAL DAILY
Qty: 30 TAB | Refills: 0 | Status: SHIPPED | OUTPATIENT
Start: 2021-04-04

## 2021-04-03 RX ORDER — PREDNISONE 10 MG/1
TABLET ORAL
Qty: 10 TAB | Refills: 0 | Status: SHIPPED | OUTPATIENT
Start: 2021-04-03

## 2021-04-03 RX ORDER — ASPIRIN 325 MG
325 TABLET ORAL DAILY
Qty: 30 TAB | Refills: 0 | Status: SHIPPED | OUTPATIENT
Start: 2021-04-04

## 2021-04-03 RX ORDER — POTASSIUM CHLORIDE 750 MG/1
40 TABLET, FILM COATED, EXTENDED RELEASE ORAL
Status: DISCONTINUED | OUTPATIENT
Start: 2021-04-03 | End: 2021-04-03 | Stop reason: SDUPTHER

## 2021-04-03 RX ADMIN — ATORVASTATIN CALCIUM 40 MG: 40 TABLET, FILM COATED ORAL at 21:33

## 2021-04-03 RX ADMIN — POLYETHYLENE GLYCOL 3350 17 G: 17 POWDER, FOR SOLUTION ORAL at 10:42

## 2021-04-03 RX ADMIN — AMIODARONE HYDROCHLORIDE 400 MG: 200 TABLET ORAL at 06:20

## 2021-04-03 RX ADMIN — ACETAMINOPHEN 650 MG: 325 TABLET, FILM COATED ORAL at 21:38

## 2021-04-03 RX ADMIN — SODIUM CHLORIDE: 234 INJECTION, SOLUTION, CONCENTRATE INTRAVENOUS at 15:58

## 2021-04-03 RX ADMIN — ASPIRIN 325 MG ORAL TABLET 325 MG: 325 PILL ORAL at 10:42

## 2021-04-03 RX ADMIN — MIRTAZAPINE 15 MG: 15 TABLET, ORALLY DISINTEGRATING ORAL at 21:33

## 2021-04-03 RX ADMIN — CEFTRIAXONE SODIUM 1 G: 1 INJECTION, POWDER, FOR SOLUTION INTRAMUSCULAR; INTRAVENOUS at 16:04

## 2021-04-03 RX ADMIN — METHYLPREDNISOLONE SODIUM SUCCINATE 40 MG: 40 INJECTION, POWDER, FOR SOLUTION INTRAMUSCULAR; INTRAVENOUS at 10:42

## 2021-04-03 RX ADMIN — METHYLPREDNISOLONE SODIUM SUCCINATE 40 MG: 40 INJECTION, POWDER, FOR SOLUTION INTRAMUSCULAR; INTRAVENOUS at 21:32

## 2021-04-03 NOTE — PROGRESS NOTES
Problem: Falls - Risk of  Goal: *Absence of Falls  Description: Document Garrett Gil Fall Risk and appropriate interventions in the flowsheet. Outcome: Progressing Towards Goal  Note: Fall Risk Interventions:       Mentation Interventions: Bed/chair exit alarm, Door open when patient unattended, Room close to nurse's station, Toileting rounds, Update white board    Medication Interventions: Bed/chair exit alarm    Elimination Interventions: Bed/chair exit alarm, Call light in reach, Toileting schedule/hourly rounds    History of Falls Interventions: Bed/chair exit alarm, Room close to nurse's station         Problem: Pressure Injury - Risk of  Goal: *Prevention of pressure injury  Description: Document Stephen Scale and appropriate interventions in the flowsheet. Outcome: Progressing Towards Goal  Note: Pressure Injury Interventions:  Sensory Interventions: Turn and reposition approx. every two hours (pillows and wedges if needed)    Moisture Interventions: Check for incontinence Q2 hours and as needed, Internal/External urinary devices, Maintain skin hydration (lotion/cream), Offer toileting Q_hr    Activity Interventions: PT/OT evaluation    Mobility Interventions: HOB 30 degrees or less, PT/OT evaluation, Turn and reposition approx.  every two hours(pillow and wedges)    Nutrition Interventions: Document food/fluid/supplement intake    Friction and Shear Interventions: Apply protective barrier, creams and emollients, Minimize layers

## 2021-04-03 NOTE — PROGRESS NOTES
Renal progress note    Patient: Stanley Stahl MRN: [id number]  SSN: [id number]    YOB: 1945  Age: 66 y.o. Sex: male      Subjective:   Pt is seen at bedside,, AMS+, no distress  S/p PEG placement today  Stable renalfunctions and stable electrolytes  In mittens    Past Medical History:   Diagnosis Date    Anemia     BPH (benign prostatic hyperplasia)     CKD (chronic kidney disease)     [facility]cere[facility] [address]     D[facility]     DVT (deep venous thrombosis) (HCC)     GERD (gastroesophageal reflux disease)     Glaucoma     Hyperlipemia      History reviewed. No pertinent surgical history. History reviewed. No pertinent family history.   Social History     Tobacco Use    Smoking status: Unknown If Ever Smoked   Substance Use Topics    Alcohol use: Not Currently      Current Facility-Administered Medications   Medication Dose Route Frequency Provider Last Rate Last Admin    potassium phosphate 30 mmol in 0.9% sodium chloride 250 mL infusion   IntraVENous ONCE Ray Simmons MD        methylPREDNISolone (PF) (SOLU-MEDROL) injection 40 mg  40 mg IntraVENous Q12H Carlene Moon MD        mirtazapine (REMERON SOL-TAB) disintegrating tablet 15 mg  15 mg Oral QHS Sheeba Chiu MD        dextrose 5% 1,000 mL with sodium chloride 4 MEQ/ML (23.4%) 34.2 mEq infusion   IntraVENous CONTINUOUS Ray Simmons MD 75 mL/hr at 04/02/21 1416 Restarted at 04/02/21 1416    amiodarone (CORDARONE) tablet 400 mg  400 mg Oral BID Lavon Forde MD   400 mg at 03/30/21 1731    acetaminophen (TYLENOL) tablet 650 mg  650 mg Oral Q6H PRN Carlene Moon MD   650 mg at 03/31/21 0308    Or    acetaminophen (TYLENOL) suppository 650 mg  650 mg Rectal Q6H PRN Yared Moon MD        polyethylene glycol (MIRALAX) packet 17 g  17 g Oral DAILY PRN Yared Moon MD        ondansetron (ZOFRAN ODT) tablet 4 mg  4 mg Oral Q8H PRN Cyrus Levin MD        Or   Kit Schneider ondansetron (ZOFRAN) injection 4 mg  4 mg IntraVENous Q6H PRN Zack Moon MD        cefTRIAXone (ROCEPHIN) 1 g in sterile water (preservative free) 10 mL IV syringe  1 g IntraVENous Q24H Martin Christina MD   1 g at 04/02/21 1409    aspirin tablet 325 mg  325 mg Oral DAILY Carlene Mono MD   325 mg at 03/29/21 1019    atorvastatin (LIPITOR) tablet 40 mg  40 mg Oral QHS Carlene Moon MD   40 mg at 03/30/21 2250        Allergies   Allergen Reactions    Flonase [Fluticasone] Unknown (comments)         Objective:     Vitals:    04/02/21 1255 04/02/21 1300 04/02/21 1529 04/02/21 2026   BP: (!) 109/48 (!) 106/49 (!) 102/58 116/70   Pulse: 72 92 91 74   Resp: 19 20 20 20   Temp:   98.1 °F (36.7 °C) 97.6 °F (36.4 °C)   SpO2: 93% 92% 98% 96%   Weight:       Height:            Physical Exam:  General: AMS  Eyes: sclera anicteric  Oral Cavity: No thrush or ulcers  Neck: no JVD  Chest: Fair bilateral air entry  Heart: normal sounds  Abdomen: soft and non tender   :  Vazquez+  Lower Extremities: no edema  Skin: no rash  Neuro: ams            Assessment:       Plan:     1 acute kidney injury:   -Etiology is likely prerenal azotemia from volume depletion.    -On admission BUN/creatinine 207/8.1, has improved down to 127/4. 22. Improving renal functions with IV hydration, creatinine down to 3.19--2.24--1.22--0.98--0.85 today  --CT abdomen no evidence of hydronephrosis. Good urine output present  Will continue IV fluids at 75 ml/hr continue to monitor renal functions    2. Hypernatremia.    -From free water deficit.    -Sodium 158-->155--157--154--147--140--141--139. Will continue hypotonic IVF at 75ml/hr for 1 more day and monitor sodium levels    3. Hyperkalemia. Improved potassium levels with medical management  -Continue to monitor potassium levels    4. Altered mental status.    -Could be metabolic encephalopathy/UTI. -CT head no acute process. -Urinalysis consistent with UTI.    -Continue to monitor clinically    Signed By: Magy Rogers MD     April 2, 2021

## 2021-04-03 NOTE — PROGRESS NOTES
Progress Note      4/3/2021 11:10 AM  NAME: Lucas Martínez   MRN:  [id number]   Admit Diagnosis: Acute renal failure (ARF) ([facility] [address] [N17.9]  Hypovolemia [E86.1]  [facility] [I48.91]  JAE [facility]acute kidney [facility]) [facility] [N17.9]        Assessment/Plan:   Atrial fibrillation, back in sinus rhythm, will decrease dose of amiodarone to maintenance, 200 mg daily. Anticoagulated with apixaban, stable hemoglobin and creatinine. CVA,Poorly responsive. Status post PEG tube           []       High complexity decision making was performed in this patient at high risk for decompensation with multiple organ involvement. Subjective:     Lucas Martínez denies chest pain, dyspnea. Discussed with RN events overnight. Review of Systems:                                                                                       Could NOT obtain due to:  Poorly responsive     Objective:      Physical Exam:    Last 24hrs VS reviewed since prior progress note. Most recent are:    Visit Vitals  /63 (BP 1 Location: Left upper arm, BP Patient Position: At rest;Supine)   Pulse 71   Temp 97.6 °F (36.4 °C)   Resp 17   Ht 6' 2.02\" (1.88 m)   Wt 69.1 kg (152 lb 5.4 oz)   SpO2 94%   BMI 19.55 kg/m²       Intake/Output Summary (Last 24 hours) at 4/3/2021 1110  Last data filed at 4/3/2021 5247  Gross per 24 hour   Intake 350 ml   Output 700 ml   Net -350 ml        General Appearance: Poorly responsive  Ears/Nose/Mouth/Throat: Hearing grossly normal; moist mucous membranes  Neck: Supple. Chest: Lungs clear to auscultation bilaterally. Cardiovascular: Regular rate and rhythm, S1S2 normal, no murmur. Abdomen: Soft, has PEG tube  Extremities: No edema bilaterally. Skin: Warm and dry. []         Post-cath site without hematoma, bruit, tenderness, or thrill. Distal pulses intact.     PMH/SH reviewed - no change compared to H&P    Data Review    Telemetry:     EKG:   []  No new EKG for review    Lab Data Personally Reviewed:    Recent Labs     04/02/21  1318 04/01/21  0540   WBC 7.9 5.4   HGB 8.4* 8.0*   HCT 27.6* 27.0*    121*     No results for input(s): INR, PTP, APTT, INREXT in the last 72 hours. Recent Labs     04/03/21  0725 04/02/21  1318 04/01/21  0540    139  139 141  141   K 3.4* 3.6  3.6 3.3*  3.3*    108  109* 112*  112*   CO2 23 22  22 21  22   BUN 14 12  11 17  16   CREA 1.02 0.85  0.86 0.98  0.99   * 117*  119* 90  92   CA 7.8* 7.9*  7.9* 7.9*  7.8*     No results for input(s): CPK, CKNDX, TROIQ in the last 72 hours. No lab exists for component: CPKMB  No results found for: CHOL, CHOLX, CHLST, CHOLV, HDL, HDLP, LDL, LDLC, DLDLP, TGLX, TRIGL, TRIGP, CHHD, CHHDX    Recent Labs     04/03/21  0725 04/02/21  1318 04/01/21  0540   AP  --  122* 115   TP  --  5.9* 5.5*   ALB 1.9* 1.9*  1.9* 1.7*  1.8*   GLOB  --  4.0 3.7     No results for input(s): PH, PCO2, PO2 in the last 72 hours.     Medications Personally Reviewed:    Current Facility-Administered Medications   Medication Dose Route Frequency    methylPREDNISolone (PF) (SOLU-MEDROL) injection 40 mg  40 mg IntraVENous Q12H    mirtazapine (REMERON SOL-TAB) disintegrating tablet 15 mg  15 mg Oral QHS    dextrose 5% 1,000 mL with sodium chloride 4 MEQ/ML (23.4%) 34.2 mEq infusion   IntraVENous CONTINUOUS    amiodarone (CORDARONE) tablet 400 mg  400 mg Oral BID    acetaminophen (TYLENOL) tablet 650 mg  650 mg Oral Q6H PRN    Or    acetaminophen (TYLENOL) suppository 650 mg  650 mg Rectal Q6H PRN    polyethylene glycol (MIRALAX) packet 17 g  17 g Oral DAILY PRN    ondansetron (ZOFRAN ODT) tablet 4 mg  4 mg Oral Q8H PRN    Or    ondansetron (ZOFRAN) injection 4 mg  4 mg IntraVENous Q6H PRN    cefTRIAXone (ROCEPHIN) 1 g in sterile water (preservative free) 10 mL IV syringe  1 g IntraVENous Q24H    aspirin tablet 325 mg  325 mg Oral DAILY    atorvastatin (LIPITOR) tablet 40 mg  40 mg Oral QHS Eva Lyles MD

## 2021-04-03 NOTE — PROGRESS NOTES
No response back from [facility] if pt can return today. CM updated pt's primary nurse.

## 2021-04-04 LAB
ALBUMIN SERPL-MCNC: 1.8 G/DL (ref 3.5–5)
ANION GAP SERPL CALC-SCNC: 5 MMOL/L (ref 5–15)
BASOPHILS # BLD: 0 K/UL (ref 0–0.1)
BASOPHILS NFR BLD: 0 % (ref 0–1)
BUN SERPL-MCNC: 18 MG/DL (ref 6–20)
BUN/CREAT SERPL: 18 (ref 12–20)
CA-I BLD-MCNC: 8 MG/DL (ref 8.5–10.1)
CHLORIDE SERPL-SCNC: 107 MMOL/L (ref 97–108)
CO2 SERPL-SCNC: 26 MMOL/L (ref 21–32)
CREAT SERPL-MCNC: 0.99 MG/DL (ref 0.7–1.3)
DIFFERENTIAL METHOD BLD: ABNORMAL
EOSINOPHIL # BLD: 0 K/UL (ref 0–0.4)
EOSINOPHIL NFR BLD: 0 % (ref 0–7)
ERYTHROCYTE [DISTWIDTH] IN BLOOD BY AUTOMATED COUNT: 19.2 % (ref 11.5–14.5)
GLUCOSE SERPL-MCNC: 155 MG/DL (ref 65–100)
HCT VFR BLD AUTO: 23.9 % (ref 36.6–50.3)
HGB BLD-MCNC: 7.2 G/DL (ref 12.1–17)
IMM GRANULOCYTES # BLD AUTO: 0.2 K/UL (ref 0–0.04)
IMM GRANULOCYTES NFR BLD AUTO: 2 % (ref 0–0.5)
LYMPHOCYTES # BLD: 0.6 K/UL (ref 0.8–3.5)
LYMPHOCYTES NFR BLD: 5 % (ref 12–49)
MCH RBC QN AUTO: 27.3 PG (ref 26–34)
MCHC RBC AUTO-ENTMCNC: 30.1 G/DL (ref 30–36.5)
MCV RBC AUTO: 90.5 FL (ref 80–99)
MONOCYTES # BLD: 0.7 K/UL (ref 0–1)
MONOCYTES NFR BLD: 6 % (ref 5–13)
MRSA DNA SPEC QL NAA+PROBE: DETECTED
NEUTS SEG # BLD: 10.1 K/UL (ref 1.8–8)
NEUTS SEG NFR BLD: 87 % (ref 32–75)
NRBC # BLD: 0.15 K/UL (ref 0–0.01)
NRBC BLD-RTO: 1.3 PER 100 WBC
PHOSPHATE SERPL-MCNC: 1.1 MG/DL (ref 2.6–4.7)
PLATELET # BLD AUTO: 184 K/UL (ref 150–400)
PMV BLD AUTO: 11.1 FL (ref 8.9–12.9)
POTASSIUM SERPL-SCNC: 3.7 MMOL/L (ref 3.5–5.1)
RBC # BLD AUTO: 2.64 M/UL (ref 4.1–5.7)
SODIUM SERPL-SCNC: 138 MMOL/L (ref 136–145)
WBC # BLD AUTO: 11.3 K/UL (ref 4.1–11.1)

## 2021-04-04 PROCEDURE — P9016 RBC LEUKOCYTES REDUCED: HCPCS

## 2021-04-04 PROCEDURE — 85025 COMPLETE CBC W/AUTO DIFF WBC: CPT

## 2021-04-04 PROCEDURE — 86923 COMPATIBILITY TEST ELECTRIC: CPT

## 2021-04-04 PROCEDURE — 74011250637 HC RX REV CODE- 250/637: Performed by: INTERNAL MEDICINE

## 2021-04-04 PROCEDURE — 74011250637 HC RX REV CODE- 250/637: Performed by: FAMILY MEDICINE

## 2021-04-04 PROCEDURE — 36430 TRANSFUSION BLD/BLD COMPNT: CPT

## 2021-04-04 PROCEDURE — 80069 RENAL FUNCTION PANEL: CPT

## 2021-04-04 PROCEDURE — 74011250637 HC RX REV CODE- 250/637: Performed by: PSYCHIATRY & NEUROLOGY

## 2021-04-04 PROCEDURE — 86901 BLOOD TYPING SEROLOGIC RH(D): CPT

## 2021-04-04 PROCEDURE — 74011250636 HC RX REV CODE- 250/636: Performed by: FAMILY MEDICINE

## 2021-04-04 PROCEDURE — 87641 MR-STAPH DNA AMP PROBE: CPT

## 2021-04-04 PROCEDURE — 65610000006 HC RM INTENSIVE CARE

## 2021-04-04 PROCEDURE — 74011000250 HC RX REV CODE- 250: Performed by: INTERNAL MEDICINE

## 2021-04-04 RX ORDER — DEXTROSE MONOHYDRATE AND SODIUM CHLORIDE 5; .225 G/100ML; G/100ML
50 INJECTION, SOLUTION INTRAVENOUS CONTINUOUS
Status: DISCONTINUED | OUTPATIENT
Start: 2021-04-04 | End: 2021-04-06

## 2021-04-04 RX ORDER — SODIUM CHLORIDE 9 MG/ML
250 INJECTION, SOLUTION INTRAVENOUS AS NEEDED
Status: DISCONTINUED | OUTPATIENT
Start: 2021-04-04 | End: 2021-04-08 | Stop reason: HOSPADM

## 2021-04-04 RX ORDER — SODIUM CHLORIDE 9 MG/ML
250 INJECTION, SOLUTION INTRAVENOUS AS NEEDED
Status: DISCONTINUED | OUTPATIENT
Start: 2021-04-04 | End: 2021-04-04

## 2021-04-04 RX ADMIN — MIRTAZAPINE 15 MG: 15 TABLET, ORALLY DISINTEGRATING ORAL at 22:00

## 2021-04-04 RX ADMIN — METHYLPREDNISOLONE SODIUM SUCCINATE 40 MG: 40 INJECTION, POWDER, FOR SOLUTION INTRAMUSCULAR; INTRAVENOUS at 08:22

## 2021-04-04 RX ADMIN — AMIODARONE HYDROCHLORIDE 200 MG: 200 TABLET ORAL at 08:22

## 2021-04-04 RX ADMIN — ASPIRIN 325 MG ORAL TABLET 325 MG: 325 PILL ORAL at 08:22

## 2021-04-04 RX ADMIN — METHYLPREDNISOLONE SODIUM SUCCINATE 40 MG: 40 INJECTION, POWDER, FOR SOLUTION INTRAMUSCULAR; INTRAVENOUS at 22:00

## 2021-04-04 RX ADMIN — DEXTROSE MONOHYDRATE AND SODIUM CHLORIDE 50 ML/HR: 5; .225 INJECTION, SOLUTION INTRAVENOUS at 10:00

## 2021-04-04 NOTE — PROGRESS NOTES
ICU admission and skin assessment    Pt admitted to ICU room 278 via Renato HAMMOND/4w. Pt is alert and aware of surroundings. V/S stable on monitor. Pt presents with skin breakdown, on right hip, size of silver dollar, not tunneling. Wound dressed, CDI. Single PIV is leaking. 3 attempts to gain another access are no go. PICC Team called to establish PIV under ultra sound.       Jose Luis HAMMOND

## 2021-04-04 NOTE — DISCHARGE SUMMARY
Discharge Summary       PATIENT ID: Seema Chao  MRN: [id number]   YOB: 1945    DATE OF ADMISSION: 3/26/2021  8:44 PM    DATE OF DISCHARGE:   PRIMARY CARE PROVIDER: Edgardo Schaefer MD     ATTENDING PHYSICIAN: Alejandra Moon  DISCHARGING PROVIDER: Alejandra Moon      CONSULTATIONS: IP CONSULT TO CARDIOLOGY  IP CONSULT TO NEPHROLOGY  IP CONSULT TO NEPHROLOGY  IP CONSULT TO NEUROLOGY  IP CONSULT TO PSYCHIATRY  IP CONSULT TO GASTROENTEROLOGY    PROCEDURES/SURGERIES: Procedure(s):  PERCUTANEOUS ENDOSCOPIC GASTROSTOMY TUBE INSERTION    ADMITTING DIAGNOSES:    Patient Active Problem List    Diagnosis Date Noted    Hypovolemia 03/27/2021    Acute renal failure (ARF) ([facility] 03/27/2021    [facility]    JAE (acute kidney injury) ([facility] 03/27/2021       DISCHARGE DIAGNOSES / PLAN:      A. fib with rapid ventricular rate       Acute kidney injury       Acute Blood Loss Anemia secondary to bleeding from nose, mouth       Hypokalemia       Hypernatremia    UTI/negative culture     History of recent Covid     History of obstructive uropathy     Dementia     Functional quadriplegic     Dermatitis     History of CVA     History of DVT     Hyperlipidemia        DISCHARGE MEDICATIONS:  Current Discharge Medication List      START taking these medications    Details   amiodarone (CORDARONE) 200 mg tablet Take 1 Tab by mouth daily. Qty: 30 Tab, Refills: 0      aspirin (ASPIRIN) 325 mg tablet Take 1 Tab by mouth daily. Qty: 30 Tab, Refills: 0      atorvastatin (LIPITOR) 40 mg tablet Take 1 Tab by mouth nightly. Qty: 30 Tab, Refills: 0      predniSONE (DELTASONE) 10 mg tablet Tablet daily for 10 days  Qty: 10 Tab, Refills: 0               NOTIFY YOUR PHYSICIAN FOR ANY OF THE FOLLOWING:   Fever over 101 degrees for 24 hours. Chest pain, shortness of breath, fever, chills, nausea, vomiting, diarrhea, change in mentation, falling, weakness, bleeding.  Severe pain or pain not relieved by medications. Or, any other signs or symptoms that you may have questions about. DISPOSITION:  x  Home With:   OT  PT  HH  RN       Long term SNF/Inpatient Rehab    Independent/assisted living    Hospice    Other:       PATIENT CONDITION AT DISCHARGE: Stable      PHYSICAL EXAMINATION AT DISCHARGE:  General:          Alert, cooperative, no distress, appears stated age. HEENT:           Atraumatic, anicteric sclerae, pink conjunctivae                          No oral ulcers, mucosa moist, throat clear, dentition fair  Neck:               Supple, symmetrical  Lungs:             Clear to auscultation bilaterally. No Wheezing or Rhonchi. No rales. Chest wall:      No tenderness  No Accessory muscle use. Heart:              Regular  rhythm,  No  murmur   No edema  Abdomen:        Soft, non-tender. Not distended. Bowel sounds normal  Extremities:     No cyanosis. No clubbing,                            Skin turgor normal, Capillary refill normal  Skin:                Not pale. Not Jaundiced  No rashes   Psych:             Not anxious or agitated. Neurologic:      Alert, moves all extremities, answers questions appropriately and responds to commands     XR CHEST PORT   Final Result   NG tube position as above. Worsening lung aeration. CT ABD PELV WO CONT   Final Result   No acute abdominopelvic abnormality. Nonobstructing bilateral renal   calculi. Correlate for constipation given increased stool burden. End-stage   degenerative changes of the hips with postoperative change in the right   acetabulum. Pulmonary interstitial abnormality partially visualized in the acute   setting would suggest infection/inflammation or edema versus a combination of   these and in any case with early airspace component at the right lung base. CT HEAD WO CONT   Final Result   No acute intracranial abnormality. 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 Mild   generalized atrophy with nonspecific white matter abnormality that may indicate   chronic small vessel disease. 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 XR CHEST PORT   Final Result   Rotated exam. Diffuse interstitial abnormality and with question of   some superimposed nodular areas. In the acute setting edema, infection or   combination of these is considered however, especially given the possible   nodular opacities, follow-up to radiographic resolution and/or further   evaluation with chest CT if symptoms/findings do not resolve as expected with   treatment, or if indicated otherwise.                 Recent Results (from the past 24 hour(s))   RENAL FUNCTION PANEL    Collection Time: 04/04/21  5:20 AM   Result Value Ref Range    Sodium 138 136 - 145 mmol/L    Potassium 3.7 3.5 - 5.1 mmol/L    Chloride 107 97 - 108 mmol/L    CO2 26 21 - 32 mmol/L    Anion gap 5 5 - 15 mmol/L    Glucose 155 (H) 65 - 100 mg/dL    BUN 18 6 - 20 mg/dL    Creatinine 0.99 0.70 - 1.30 mg/dL    BUN/Creatinine ratio 18 12 - 20      GFR est AA >60 >60 ml/min/1.73m2    GFR est non-AA >60 >60 ml/min/1.73m2    Calcium 8.0 (L) 8.5 - 10.1 mg/dL    Phosphorus 1.1 (L) 2.6 - 4.7 mg/dL    Albumin 1.8 (L) 3.5 - 5.0 g/dL          HOSPITAL COURSE:       Patient is a 66y.o. year old male with significant past medical history of dementia history of stroke in the past history of COVID-19 pneumonitis in the past obstructive uropathy acute kidney injury proximal A. fib nonpressure chronic ulcer of the lower leg history of MRSA infection major depression history of acute emboli and thrombosis history of peripheral vascular disease history of hypertension hyperlipidemia history of GERD history of BPH history of CVA was transferred from the nursing home because on eating drinking well since patient was diagnosed with Covid after that stopped eating drinking  Seen by the ER physician work-up shows acute kidney injury with hyperkalemia    Patient was treated with IV fluids and also his NG tube and start feeding  Patient seen by the nephrologist during his admission  And also seen by the cardiologist regarding A.  Fib  Medication was adjusted patient was getting feeding with NG tube and NG tube was taken out GI was consulted and PEG tube was placed patient has significant amount of  Nasal bleed Heparin were discontinued and HH H&H was monitored  Now remained stable patient getting PEG tube feeding tolerating well not in distress    Patient hemoglobin is 8.4 remained stable BUN 14 creatinine 1.02 back to baseline cultures was negative patient was discharged to skilled care rehab in improved condition    Aspiration precautions    Patient resting the bed alert awake no distress    Signed:   Mel Ross MD  4/4/2021  3:17 PM

## 2021-04-04 NOTE — PROGRESS NOTES
1420 patient had a massive gi bleed. Dr. Shalom Cisse in attendance. Orders received and implemented. Family member called for blood consent and notified of transfer to ICU. Report called and patient moved at 1600 after receiving confirmation of availble bed.

## 2021-04-04 NOTE — CONSULTS
Gastroenterology Consult     Referring Physician: Evonne Mckinnon MD     Consult Date: 4/4/2021     Subjective:     Chief Complaint: GI bleeding    History of Present Illness: Arianna Calero is a 66 y.o. male who is seen in consultation for GI bleeding. Patient was admitted to the hospital with rectal bleeding  Patient was recently admitted to the hospital because of poor p.o. intake he has history of cerebrovascular accident dementia deep venous thrombosis reflux and anemia. He does not give any meaningful history most of the historical data was gathered from chart. Patient was initially admitted due to poor p.o. intake and had a PEG tube placed. Today I was called because patient had large amount of rectal bleeding 4 hours ago. Since then he has not had any other episode of bleeding his blood pressure and heart rate has remained stable. Patient has been taking prednisone I am not sure the reason for steroid treatment. Patient is a DNR and there is a niece who is the power of . She has given consent for blood transfusion and patient has been typed and crossed. Past Medical History:   Diagnosis Date    Anemia     BPH (benign prostatic hyperplasia)     CKD (chronic kidney disease)     [facility]     Dementia [facility]     DVT (deep venous thrombosis) (HCC)     GERD (gastroesophageal reflux disease)     Glaucoma     Hyperlipemia      History reviewed. No pertinent surgical history. History reviewed. No pertinent family history.   Social History     Tobacco Use    Smoking status: Unknown If Ever Smoked   Substance Use Topics    Alcohol use: Not Currently      Allergies   Allergen Reactions    Flonase [Fluticasone] Unknown (comments)     Current Facility-Administered Medications   Medication Dose Route Frequency    dextrose 5 % - 0.2% NaCl infusion  50 mL/hr IntraVENous CONTINUOUS    0.9% sodium chloride infusion 250 mL  250 mL IntraVENous PRN    0.9% sodium chloride infusion 250 mL  250 mL IntraVENous PRN    amiodarone (CORDARONE) tablet 200 mg  200 mg Oral DAILY    methylPREDNISolone (PF) (SOLU-MEDROL) injection 40 mg  40 mg IntraVENous Q12H    mirtazapine (REMERON SOL-TAB) disintegrating tablet 15 mg  15 mg Oral QHS    acetaminophen (TYLENOL) tablet 650 mg  650 mg Oral Q6H PRN    Or    acetaminophen (TYLENOL) suppository 650 mg  650 mg Rectal Q6H PRN    polyethylene glycol (MIRALAX) packet 17 g  17 g Oral DAILY PRN    ondansetron (ZOFRAN ODT) tablet 4 mg  4 mg Oral Q8H PRN    Or    ondansetron (ZOFRAN) injection 4 mg  4 mg IntraVENous Q6H PRN    cefTRIAXone (ROCEPHIN) 1 g in sterile water (preservative free) 10 mL IV syringe  1 g IntraVENous Q24H    atorvastatin (LIPITOR) tablet 40 mg  40 mg Oral QHS        Review of Systems:  Unreliable. Objective:     Physical Exam:  Visit Vitals  /61   Pulse 80   Temp 97.5 °F (36.4 °C)   Resp 20   Ht 6' 2.02\" (1.88 m)   Wt 69.1 kg (152 lb 5.4 oz)   SpO2 99%   BMI 19.55 kg/m²        Skin:  Extremities and face reveal no rashes. No milian erythema. No telangiectasias on the chest wall. HEENT: Sclerae anicteric. Extra-occular muscles are intact. No oral ulcers. No abnormal pigmentation of the lips. The neck is supple. Cardiovascular: Regular rate and rhythm. No murmurs, gallops, or rubs. PMI nondisplaced. Carotids without bruits. Respiratory:  Comfortable breathing with no accessory muscle use. Clear breath sounds with no wheezes, rales, or rhonchi. GI:  Abdomen nondistended, soft, and nontender. Normal active bowel sounds. No enlargement of the liver or spleen. No masses palpable. Rectal:  Deferred  Musculoskeletal:  No pitting edema of the lower legs. Extremities have good range of motion. No costovertebral tenderness. Neurological: Poor memory and dementia  Psychiatric:  Mood appears appropriate   Lymphatic:  No cervical or supraclavicular adenopathy.     Lab/Data Review:  Recent Results (from the past 24 hour(s))   RENAL FUNCTION PANEL    Collection Time: 04/04/21  5:20 AM   Result Value Ref Range    Sodium 138 136 - 145 mmol/L    Potassium 3.7 3.5 - 5.1 mmol/L    Chloride 107 97 - 108 mmol/L    CO2 26 21 - 32 mmol/L    Anion gap 5 5 - 15 mmol/L    Glucose 155 (H) 65 - 100 mg/dL    BUN 18 6 - 20 mg/dL    Creatinine 0.99 0.70 - 1.30 mg/dL    BUN/Creatinine ratio 18 12 - 20      GFR est AA >60 >60 ml/min/1.73m2    GFR est non-AA >60 >60 ml/min/1.73m2    Calcium 8.0 (L) 8.5 - 10.1 mg/dL    Phosphorus 1.1 (L) 2.6 - 4.7 mg/dL    Albumin 1.8 (L) 3.5 - 5.0 g/dL        XR CHEST PORT   Final Result   NG tube position as above. Worsening lung aeration. CT ABD PELV WO CONT   Final Result   No acute abdominopelvic abnormality. Nonobstructing bilateral renal   calculi. Correlate for constipation given increased stool burden. End-stage   degenerative changes of the hips with postoperative change in the right   acetabulum. Pulmonary interstitial abnormality partially visualized in the acute   setting would suggest infection/inflammation or edema versus a combination of   these and in any case with early airspace component at the right lung base. CT HEAD WO CONT   Final Result   No acute intracranial abnormality. 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 Mild   generalized atrophy with nonspecific white matter abnormality that may indicate   chronic small vessel disease. 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 XR CHEST PORT   Final Result   Rotated exam. Diffuse interstitial abnormality and with question of   some superimposed nodular areas.  In the acute setting edema, infection or   combination of these is considered however, especially given the possible   nodular opacities, follow-up to radiographic resolution and/or further   evaluation with chest CT if symptoms/findings do not resolve as expected with   treatment, or if indicated otherwise. Assessment/Plan:     Active Problems:    Hypovolemia (3/27/2021)      Acute renal failure (ARF) (HCC) (3/27/2021)      Atrial fibrillation, rapid ([facility] (3/27/2021)      [facility] (3/27/2021)    Acute GI bleeding I would recommend to IV lines, transfer the patient to intensive care unit, type and cross 2 units of packed RBCs and transfuse if hemoglobin is below 8. I would also recommend a stat bleeding scan and depending upon his clinical status will consider a flexible sigmoidoscopy tomorrow I would also hold off on steroids.       IP CONSULT TO CARDIOLOGY  IP CONSULT TO NEPHROLOGY  IP CONSULT TO NEPHROLOGY  IP CONSULT TO NEUROLOGY  IP CONSULT TO PSYCHIATRY  IP CONSULT TO GASTROENTEROLOGY  IP CONSULT TO GASTROENTEROLOGY    Thank you for allowing me to participate in this patients care  Cc Referring Physician   Denver Macadam, MD

## 2021-04-04 NOTE — PROGRESS NOTES
Renal progress note    Patient: Rolando Cassidy MRN: [id number]  SSN: [id number]    YOB: 1945  Age: 66 y.o. Sex: male      Subjective:   Pt is seen at bedside,, AMS+, no distress  S/p PEG placement   Stable renalfunctions and stable electrolytes      Past Medical History:   Diagnosis Date    Anemia     BPH (benign prostatic hyperplasia)     CKD (chronic kidney disease)     CVA (cerebral vascular accident) ([facility]     [facility]     [facility]     GERD (gastroesophageal reflux disease)     Glaucoma     Hyperlipemia      History reviewed. No pertinent surgical history. History reviewed. No pertinent family history.   Social History     Tobacco Use    Smoking status: Unknown If Ever Smoked   Substance Use Topics    Alcohol use: Not Currently      Current Facility-Administered Medications   Medication Dose Route Frequency Provider Last Rate Last Admin    dextrose 5 % - 0.2% NaCl infusion  50 mL/hr IntraVENous CONTINUOUS Ray Simmons MD 50 mL/hr at 04/04/21 1000 50 mL/hr at 04/04/21 1000    0.9% sodium chloride infusion 250 mL  250 mL IntraVENous PRN Shakeel Leggett MD        amiodarone (CORDARONE) tablet 200 mg  200 mg Oral DAILY Ravinder Blood MD   200 mg at 04/04/21 5775    methylPREDNISolone (PF) (SOLU-MEDROL) injection 40 mg  40 mg IntraVENous Q12H Carlene Moon MD   40 mg at 04/04/21 1260    mirtazapine (REMERON SOL-TAB) disintegrating tablet 15 mg  15 mg Oral QHS Ángel Morton MD   15 mg at 04/03/21 2133    acetaminophen (TYLENOL) tablet 650 mg  650 mg Oral Q6H PRN Toma Moon MD   650 mg at 04/03/21 2138    Or    acetaminophen (TYLENOL) suppository 650 mg  650 mg Rectal Q6H PRN Toma Moon MD        polyethylene glycol (MIRALAX) packet 17 g  17 g Oral DAILY PRN Carlene Moon MD   17 g at 04/03/21 1042    ondansetron (ZOFRAN ODT) tablet 4 mg  4 mg Oral Q8H PRN Toma Moon MD        Or    ondansetron [facility]) injection 4 mg  4 mg IntraVENous Q6H PRN Zack Moon MD        cefTRIAXone (ROCEPHIN) 1 g in sterile water (preservative free) 10 mL IV syringe  1 g IntraVENous Q24H Martin Christina MD   1 g at 04/03/21 1604    atorvastatin (LIPITOR) tablet 40 mg  40 mg Oral QHS Carlene Moon MD   40 mg at 04/03/21 2133        Allergies   Allergen Reactions    Flonase [Fluticasone] Unknown (comments)         Objective:     Vitals:    04/04/21 1800 04/04/21 1815 04/04/21 1830 04/04/21 1845   BP: 120/66 112/70 115/61 133/63   Pulse: 72 65 73    Resp: 21 23 20 22   Temp: 98 °F (36.7 °C) 98.2 °F (36.8 °C) 98.3 °F (36.8 °C) 98.6 °F (37 °C)   SpO2: 95% 95% 93% 96%   Weight:       Height:            Physical Exam:  General: AMS  Eyes: sclera anicteric  Oral Cavity: No thrush or ulcers  Neck: no JVD  Chest: Fair bilateral air entry  Heart: normal sounds  Abdomen: soft and non tender   :  Vazquez+  Lower Extremities: no edema  Skin: no rash  Neuro: ams            Assessment:       Plan:     1 acute kidney injury:   -Etiology is likely prerenal azotemia from volume depletion.    -On admission BUN/creatinine 207/8.1, has improved down to 127/4. 22. Improving renal functions with IV hydration, creatinine down to 3.19--2.24--1.22--0.98--0.85--1.02--0.99 today  --CT abdomen no evidence of hydronephrosis. Good urine output present  Will continue IV fluids at 50 ml/hr continue to monitor renal functions    2. Hypernatremia.    -From free water deficit.    -Sodium 158-->155--157--154--147--140--141--139--138 today   Will continue hypotonic IVF at 50 ml/hr and monitor sodium levels    3. Hyperkalemia. Improved potassium levels with medical management  -Continue to monitor potassium levels    4. Altered mental status.    -Could be metabolic encephalopathy/UTI. -CT head no acute process. -Urinalysis consistent with UTI.    -Continue to monitor clinically    Signed By: French Enriquez MD     April 4, 2021

## 2021-04-04 NOTE — PROGRESS NOTES
NEURO PROGRESS NOTE        SUBJECTIVE:   Failure to thrive  Decreased p.o. intake  Mental status changes  Noncompliance with medications  Confusion      EXAM:  Awake, slightly less confused, slightly less lethargic  Reacts to voice more readily and mumbles a few words  Attempts to follow commands  No new focality      ASSESSMENT/PLAN:  And is being discharged  ALLERGIES:    Allergies   Allergen Reactions    Flonase [Fluticasone] Unknown (comments)       MEDS:      Current Facility-Administered Medications:     dextrose 5 % - 0.2% NaCl infusion, 50 mL/hr, IntraVENous, CONTINUOUS, Ray Simmons MD, Last Rate: 50 mL/hr at 04/04/21 1000, 50 mL/hr at 04/04/21 1000    amiodarone (CORDARONE) tablet 200 mg, 200 mg, Oral, DAILY, Brock Shook MD, 200 mg at 04/04/21 4910    methylPREDNISolone (PF) (SOLU-MEDROL) injection 40 mg, 40 mg, IntraVENous, Q12H, Carlene Moon MD, 40 mg at 04/04/21 1881    mirtazapine (REMERON SOL-TAB) disintegrating tablet 15 mg, 15 mg, Oral, QHS, Tanner Choudhary MD, 15 mg at 04/03/21 2133    acetaminophen (TYLENOL) tablet 650 mg, 650 mg, Oral, Q6H PRN, 650 mg at 04/03/21 2138 **OR** acetaminophen (TYLENOL) suppository 650 mg, 650 mg, Rectal, Q6H PRN, Sara Moon MD    polyethylene glycol (MIRALAX) packet 17 g, 17 g, Oral, DAILY PRN, Carlene Moon MD, 17 g at 04/03/21 1042    ondansetron (ZOFRAN ODT) tablet 4 mg, 4 mg, Oral, Q8H PRN **OR** ondansetron (ZOFRAN) injection 4 mg, 4 mg, IntraVENous, Q6H PRN, Carlene Moon MD    cefTRIAXone (ROCEPHIN) 1 g in sterile water (preservative free) 10 mL IV syringe, 1 g, IntraVENous, Q24H, Brendan Coreas MD, 1 g at 04/03/21 1604    aspirin tablet 325 mg, 325 mg, Oral, DAILY, Carlene Moon MD, 325 mg at 04/04/21 0109    atorvastatin (LIPITOR) tablet 40 mg, 40 mg, Oral, QHS, Carlene Moon MD, 40 mg at 04/03/21 0273    LABS:  Recent Results (from the past 24 hour(s))   EKG, 12 LEAD, SUBSEQUENT    Collection Time: 04/03/21 11:06 AM   Result Value Ref Range    Ventricular Rate 77 BPM    Atrial Rate 77 BPM    P-R Interval 164 ms    QRS Duration 100 ms    Q-T Interval 414 ms    QTC Calculation (Bezet) 468 ms    Calculated P Axis 57 degrees    Calculated R Axis 0 degrees    Calculated T Axis 34 degrees    Diagnosis       Normal sinus rhythm  Normal ECG    Confirmed by Jorge Schmidt MD, Min Gipson ([id number]) on 4/3/2021 1:08:26 PM     RENAL FUNCTION PANEL    Collection Time: 04/04/21  5:20 AM   Result Value Ref Range    Sodium 138 136 - 145 mmol/L    Potassium 3.7 3.5 - 5.1 mmol/L    Chloride 107 97 - 108 mmol/L    CO2 26 21 - 32 mmol/L    Anion gap 5 5 - 15 mmol/L    Glucose 155 (H) 65 - 100 mg/dL    BUN 18 6 - 20 mg/dL    Creatinine 0.99 0.70 - 1.30 mg/dL    BUN/Creatinine ratio 18 12 - 20      GFR est AA >60 >60 ml/min/1.73m2    GFR est non-AA >60 >60 ml/min/1.73m2    Calcium 8.0 (L) 8.5 - 10.1 mg/dL    Phosphorus 1.1 (L) 2.6 - 4.7 mg/dL    Albumin 1.8 (L) 3.5 - 5.0 g/dL       Visit Vitals  /70 (BP Patient Position: At rest;Supine)   Pulse 72   Temp 97.4 °F (36.3 °C)   Resp 20   Ht 6' 2.02\" (1.88 m)   Wt 69.1 kg (152 lb 5.4 oz)   SpO2 99%   BMI 19.55 kg/m²       Imaging:  XR CHEST PORT   Final Result   NG tube position as above. Worsening lung aeration. CT ABD PELV WO CONT   Final Result   No acute abdominopelvic abnormality. Nonobstructing bilateral renal   calculi. Correlate for constipation given increased stool burden. End-stage   degenerative changes of the hips with postoperative change in the right   acetabulum. Pulmonary interstitial abnormality partially visualized in the acute   setting would suggest infection/inflammation or edema versus a combination of   these and in any case with early airspace component at the right lung base. CT HEAD WO CONT   Final Result   No acute intracranial abnormality. 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  Mild   generalized atrophy with nonspecific white matter abnormality that may indicate   chronic small vessel disease. 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 XR CHEST PORT   Final Result   Rotated exam. Diffuse interstitial abnormality and with question of   some superimposed nodular areas. In the acute setting edema, infection or   combination of these is considered however, especially given the possible   nodular opacities, follow-up to radiographic resolution and/or further   evaluation with chest CT if symptoms/findings do not resolve as expected with   treatment, or if indicated otherwise.

## 2021-04-04 NOTE — PROGRESS NOTES
HPI:    Patient ID: Dominic Smith is a 45year old female. 78-year-old female presents for follow-up. Here to review MRI of the lumbar spine. Persistent symptoms in the dorsum of the foot and the lateral aspect of the lower leg.   Tightness in the Renal progress note    Patient: Zack Petit MRN: [id number]  SSN: [id number]    YOB: 1945  Age: 66 y.o. Sex: male      Subjective:   Pt is seen at bedside,, AMS+, no distress  S/p PEG placement   Stable renalfunctions and stable electrolytes  In mittens    Past Medical History:   Diagnosis Date    Anemia     BPH (benign prostatic hyperplasia)     CKD (chronic kidney disease)     [facility]     Dementia ([facility]     DVT (deep venous thrombosis) (HCC)     GERD (gastroesophageal reflux disease)     Glaucoma     Hyperlipemia      History reviewed. No pertinent surgical history. History reviewed. No pertinent family history.   Social History     Tobacco Use    Smoking status: Unknown If Ever Smoked   Substance Use Topics    Alcohol use: Not Currently      Current Facility-Administered Medications   Medication Dose Route Frequency Provider Last Rate Last Admin    [START ON 4/4/2021] amiodarone (CORDARONE) tablet 200 mg  200 mg Oral DAILY Vee Cleaning MD        potassium chloride (KLOR-CON) packet for solution 40 mEq  40 mEq Per G Tube NOW Kinga Moon MD        methylPREDNISolone (PF) (SOLU-MEDROL) injection 40 mg  40 mg IntraVENous Q12H Carlene Moon MD   40 mg at 04/03/21 2132    mirtazapine (REMERON SOL-TAB) disintegrating tablet 15 mg  15 mg Oral QHS Jarek Candelaria MD   15 mg at 04/03/21 2133    dextrose 5% 1,000 mL with sodium chloride 4 MEQ/ML (23.4%) 34.2 mEq infusion   IntraVENous CONTINUOUS Rosemarie Simmons MD 75 mL/hr at 04/03/21 1558 New Bag at 04/03/21 1558    acetaminophen (TYLENOL) tablet 650 mg  650 mg Oral Q6H PRN Carlene Moon MD   650 mg at 04/03/21 2138    Or    acetaminophen (TYLENOL) suppository 650 mg  650 mg Rectal Q6H PRN Kinga Moon MD        polyethylene glycol (MIRALAX) packet 17 g  17 g Oral DAILY PRN Carlene Moon MD   17 g at 04/03/21 1042    ondansetron (ZOFRAN ODT) tablet 4 mg  4 mg Oral 12/8/2020:  FINDINGS:    LUMBAR DISC LEVELS  L1-L2:  No significant disc/facet abnormality, spinal stenosis, or foraminal narrowing. L2-L3:  There is mild disc space narrowing and desiccation. There is a small annular bulging disc.   There is mild facet a Q8H PRN Isaac Moon MD        Or    ondansetron [facility]) injection 4 mg  4 mg IntraVENous Q6H PRN Isaac Moon MD        cefTRIAXone (ROCEPHIN) 1 g in sterile water (preservative free) 10 mL IV syringe  1 g IntraVENous Q24H Mary Garland MD   1 g at 04/03/21 1604    aspirin tablet 325 mg  325 mg Oral DAILY Carlene Moon MD   325 mg at 04/03/21 1042    atorvastatin (LIPITOR) tablet 40 mg  40 mg Oral QHS Carlene Moon MD   40 mg at 04/03/21 2133        Allergies   Allergen Reactions    Flonase [Fluticasone] Unknown (comments)         Objective:     Vitals:    04/03/21 1244 04/03/21 1259 04/03/21 1533 04/03/21 1917   BP:  110/65 110/66 114/69   Pulse:  74 70 76   Resp:  18 25 22   Temp:  97.4 °F (36.3 °C) 97.8 °F (36.6 °C) 97.8 °F (36.6 °C)   SpO2: 99% 99% 96% 96%   Weight:       Height:            Physical Exam:  General: AMS  Eyes: sclera anicteric  Oral Cavity: No thrush or ulcers  Neck: no JVD  Chest: Fair bilateral air entry  Heart: normal sounds  Abdomen: soft and non tender   :  Vazquez+  Lower Extremities: no edema  Skin: no rash  Neuro: ams            Assessment:       Plan:     1 acute kidney injury:   -Etiology is likely prerenal azotemia from volume depletion.    -On admission BUN/creatinine 207/8.1, has improved down to 127/4. 22. Improving renal functions with IV hydration, creatinine down to 3.19--2.24--1.22--0.98--0.85--1.02 today  --CT abdomen no evidence of hydronephrosis. Good urine output present  Will continue IV fluids at 50 ml/hr continue to monitor renal functions    2. Hypernatremia.    -From free water deficit.    -Sodium 158-->155--157--154--147--140--141--139--138. Will continue hypotonic IVF at 50 ml/hr and monitor sodium levels    3. Hyperkalemia. Improved potassium levels with medical management  -Continue to monitor potassium levels    4. Altered mental status.    -Could be metabolic encephalopathy/UTI. -CT head no acute process.     -Urinalysis consistent with UTI.    -Continue to monitor clinically    Signed By: Pepe Leo MD     April 3, 2021 therapeutic purposes. Hold celebrex 24h before injection. Risks of procedure discussed. Follow up for injection.     Molly Londono DO  Physical Medicine and [address] [place] [address]

## 2021-04-05 ENCOUNTER — APPOINTMENT (OUTPATIENT)
Dept: NUCLEAR MEDICINE | Age: 76
DRG: 682 | End: 2021-04-05
Attending: INTERNAL MEDICINE
Payer: MEDICARE

## 2021-04-05 ENCOUNTER — APPOINTMENT (OUTPATIENT)
Dept: ENDOSCOPY | Age: 76
DRG: 682 | End: 2021-04-05
Attending: INTERNAL MEDICINE
Payer: MEDICARE

## 2021-04-05 LAB
ABO + RH BLD: NORMAL
ALBUMIN SERPL-MCNC: 1.7 G/DL (ref 3.5–5)
ALBUMIN SERPL-MCNC: 1.7 G/DL (ref 3.5–5)
ALBUMIN/GLOB SERPL: 0.5 {RATIO} (ref 1.1–2.2)
ALP SERPL-CCNC: 114 U/L (ref 45–117)
ALT SERPL-CCNC: 41 U/L (ref 12–78)
ANION GAP SERPL CALC-SCNC: 5 MMOL/L (ref 5–15)
ANION GAP SERPL CALC-SCNC: 7 MMOL/L (ref 5–15)
AST SERPL W P-5'-P-CCNC: 19 U/L (ref 15–37)
BASOPHILS # BLD: 0 K/UL (ref 0–0.1)
BASOPHILS NFR BLD: 0 % (ref 0–1)
BILIRUB SERPL-MCNC: 0.3 MG/DL (ref 0.2–1)
BLD PROD TYP BPU: NORMAL
BLD PROD TYP BPU: NORMAL
BLOOD GROUP ANTIBODIES SERPL: NEGATIVE
BPU ID: NORMAL
BPU ID: NORMAL
BUN SERPL-MCNC: 17 MG/DL (ref 6–20)
BUN SERPL-MCNC: 18 MG/DL (ref 6–20)
BUN/CREAT SERPL: 19 (ref 12–20)
BUN/CREAT SERPL: 20 (ref 12–20)
CA-I BLD-MCNC: 7.5 MG/DL (ref 8.5–10.1)
CA-I BLD-MCNC: 7.6 MG/DL (ref 8.5–10.1)
CHLORIDE SERPL-SCNC: 104 MMOL/L (ref 97–108)
CHLORIDE SERPL-SCNC: 105 MMOL/L (ref 97–108)
CO2 SERPL-SCNC: 24 MMOL/L (ref 21–32)
CO2 SERPL-SCNC: 26 MMOL/L (ref 21–32)
CREAT SERPL-MCNC: 0.91 MG/DL (ref 0.7–1.3)
CREAT SERPL-MCNC: 0.91 MG/DL (ref 0.7–1.3)
CROSSMATCH RESULT,%XM: NORMAL
CROSSMATCH RESULT,%XM: NORMAL
DIFFERENTIAL METHOD BLD: ABNORMAL
EOSINOPHIL # BLD: 0 K/UL (ref 0–0.4)
EOSINOPHIL NFR BLD: 0 % (ref 0–7)
ERYTHROCYTE [DISTWIDTH] IN BLOOD BY AUTOMATED COUNT: 18 % (ref 11.5–14.5)
GLOBULIN SER CALC-MCNC: 3.7 G/DL (ref 2–4)
GLUCOSE SERPL-MCNC: 164 MG/DL (ref 65–100)
GLUCOSE SERPL-MCNC: 167 MG/DL (ref 65–100)
HCT VFR BLD AUTO: 29.4 % (ref 36.6–50.3)
HGB BLD-MCNC: 9.3 G/DL (ref 12.1–17)
IMM GRANULOCYTES # BLD AUTO: 0.2 K/UL (ref 0–0.04)
IMM GRANULOCYTES NFR BLD AUTO: 2 % (ref 0–0.5)
LYMPHOCYTES # BLD: 0.6 K/UL (ref 0.8–3.5)
LYMPHOCYTES NFR BLD: 6 % (ref 12–49)
MCH RBC QN AUTO: 27.8 PG (ref 26–34)
MCHC RBC AUTO-ENTMCNC: 31.6 G/DL (ref 30–36.5)
MCV RBC AUTO: 88 FL (ref 80–99)
MONOCYTES # BLD: 0.6 K/UL (ref 0–1)
MONOCYTES NFR BLD: 6 % (ref 5–13)
NEUTS SEG # BLD: 9.7 K/UL (ref 1.8–8)
NEUTS SEG NFR BLD: 89 % (ref 32–75)
NRBC # BLD: 0.11 K/UL (ref 0–0.01)
NRBC BLD-RTO: 1 PER 100 WBC
PHOSPHATE SERPL-MCNC: 1.6 MG/DL (ref 2.6–4.7)
PLATELET # BLD AUTO: 163 K/UL (ref 150–400)
PMV BLD AUTO: 10.8 FL (ref 8.9–12.9)
POTASSIUM SERPL-SCNC: 4.3 MMOL/L (ref 3.5–5.1)
POTASSIUM SERPL-SCNC: 4.3 MMOL/L (ref 3.5–5.1)
PROT SERPL-MCNC: 5.4 G/DL (ref 6.4–8.2)
RBC # BLD AUTO: 3.34 M/UL (ref 4.1–5.7)
SODIUM SERPL-SCNC: 135 MMOL/L (ref 136–145)
SODIUM SERPL-SCNC: 136 MMOL/L (ref 136–145)
SPECIMEN EXP DATE BLD: NORMAL
STATUS OF UNIT,%ST: NORMAL
STATUS OF UNIT,%ST: NORMAL
TRANSFUSION STATUS PATIENT QL: NORMAL
TRANSFUSION STATUS PATIENT QL: NORMAL
UNIT DIVISION, %UDIV: 0
UNIT DIVISION, %UDIV: 0
WBC # BLD AUTO: 11 K/UL (ref 4.1–11.1)

## 2021-04-05 PROCEDURE — 0DJD8ZZ INSPECTION OF LOWER INTESTINAL TRACT, VIA NATURAL OR ARTIFICIAL OPENING ENDOSCOPIC: ICD-10-PCS | Performed by: INTERNAL MEDICINE

## 2021-04-05 PROCEDURE — 76040000019: Performed by: INTERNAL MEDICINE

## 2021-04-05 PROCEDURE — 74011250637 HC RX REV CODE- 250/637: Performed by: PSYCHIATRY & NEUROLOGY

## 2021-04-05 PROCEDURE — 74011000250 HC RX REV CODE- 250: Performed by: INTERNAL MEDICINE

## 2021-04-05 PROCEDURE — 85025 COMPLETE CBC W/AUTO DIFF WBC: CPT

## 2021-04-05 PROCEDURE — 74011250636 HC RX REV CODE- 250/636: Performed by: INTERNAL MEDICINE

## 2021-04-05 PROCEDURE — 65270000029 HC RM PRIVATE

## 2021-04-05 PROCEDURE — 74011250637 HC RX REV CODE- 250/637: Performed by: FAMILY MEDICINE

## 2021-04-05 PROCEDURE — 80053 COMPREHEN METABOLIC PANEL: CPT

## 2021-04-05 PROCEDURE — 80069 RENAL FUNCTION PANEL: CPT

## 2021-04-05 PROCEDURE — 74011250636 HC RX REV CODE- 250/636: Performed by: FAMILY MEDICINE

## 2021-04-05 PROCEDURE — 36415 COLL VENOUS BLD VENIPUNCTURE: CPT

## 2021-04-05 PROCEDURE — 74011250637 HC RX REV CODE- 250/637: Performed by: INTERNAL MEDICINE

## 2021-04-05 RX ORDER — FENTANYL CITRATE 50 UG/ML
INJECTION, SOLUTION INTRAMUSCULAR; INTRAVENOUS AS NEEDED
Status: DISCONTINUED | OUTPATIENT
Start: 2021-04-05 | End: 2021-04-08 | Stop reason: HOSPADM

## 2021-04-05 RX ORDER — HALOPERIDOL 5 MG/ML
0.5 INJECTION INTRAMUSCULAR ONCE
Status: COMPLETED | OUTPATIENT
Start: 2021-04-05 | End: 2021-04-05

## 2021-04-05 RX ORDER — MIDAZOLAM HYDROCHLORIDE 1 MG/ML
4 INJECTION, SOLUTION INTRAMUSCULAR; INTRAVENOUS AS NEEDED
Status: DISCONTINUED | OUTPATIENT
Start: 2021-04-05 | End: 2021-04-05 | Stop reason: HOSPADM

## 2021-04-05 RX ADMIN — FAMOTIDINE 20 MG: 10 INJECTION, SOLUTION INTRAVENOUS at 21:59

## 2021-04-05 RX ADMIN — ATORVASTATIN CALCIUM 40 MG: 40 TABLET, FILM COATED ORAL at 21:59

## 2021-04-05 RX ADMIN — MIRTAZAPINE 15 MG: 15 TABLET, ORALLY DISINTEGRATING ORAL at 21:59

## 2021-04-05 RX ADMIN — FAMOTIDINE 20 MG: 10 INJECTION, SOLUTION INTRAVENOUS at 10:06

## 2021-04-05 RX ADMIN — HALOPERIDOL LACTATE 0.5 MG: 5 INJECTION, SOLUTION INTRAMUSCULAR at 23:23

## 2021-04-05 RX ADMIN — AMIODARONE HYDROCHLORIDE 200 MG: 200 TABLET ORAL at 10:03

## 2021-04-05 RX ADMIN — METHYLPREDNISOLONE SODIUM SUCCINATE 40 MG: 40 INJECTION, POWDER, FOR SOLUTION INTRAMUSCULAR; INTRAVENOUS at 10:06

## 2021-04-05 RX ADMIN — METHYLPREDNISOLONE SODIUM SUCCINATE 40 MG: 40 INJECTION, POWDER, FOR SOLUTION INTRAMUSCULAR; INTRAVENOUS at 21:59

## 2021-04-05 RX ADMIN — CEFTRIAXONE SODIUM 1 G: 1 INJECTION, POWDER, FOR SOLUTION INTRAMUSCULAR; INTRAVENOUS at 14:30

## 2021-04-05 NOTE — DISCHARGE SUMMARY
Discharge Summary       PATIENT ID: Carson Juarez  MRN: [id number]   YOB: 1945    DATE OF ADMISSION: 3/26/2021  8:44 PM    DATE OF DISCHARGE:   PRIMARY CARE PROVIDER: Damaris Jeong MD     ATTENDING PHYSICIAN: Josep Moon  DISCHARGING PROVIDER: Josep Moon      CONSULTATIONS: IP CONSULT TO CARDIOLOGY  IP CONSULT TO NEPHROLOGY  IP CONSULT TO NEPHROLOGY  IP CONSULT TO NEUROLOGY  IP CONSULT TO PSYCHIATRY  IP CONSULT TO GASTROENTEROLOGY  IP CONSULT TO GASTROENTEROLOGY    PROCEDURES/SURGERIES: Procedure(s):  SIGMOIDOSCOPY    ADMITTING DIAGNOSES:    Patient Active Problem List    Diagnosis Date Noted    Hypovolemia 03/27/2021    Acute renal failure (ARF) ([facility] 03/27/2021    Atrial fibrillation, rapid ([facility] 03/27/2021    JAE (acute kidney injury) ([facility] 03/27/2021       DISCHARGE DIAGNOSES / PLAN:      A. fib with rapid ventricular rate       Acute kidney injury       Acute Blood Loss Anemia secondary to bleeding from nose, mouth       Hypokalemia       Hypernatremia    UTI/negative culture     History of recent Covid     History of obstructive uropathy     Dementia     Functional quadriplegic     Dermatitis     History of CVA     History of DVT     Hyperlipidemia        DISCHARGE MEDICATIONS:  Current Discharge Medication List      START taking these medications    Details   amiodarone (CORDARONE) 200 mg tablet Take 1 Tab by mouth daily. Qty: 30 Tab, Refills: 0      aspirin (ASPIRIN) 325 mg tablet Take 1 Tab by mouth daily. Qty: 30 Tab, Refills: 0      atorvastatin (LIPITOR) 40 mg tablet Take 1 Tab by mouth nightly. Qty: 30 Tab, Refills: 0      predniSONE (DELTASONE) 10 mg tablet Tablet daily for 10 days  Qty: 10 Tab, Refills: 0               NOTIFY YOUR PHYSICIAN FOR ANY OF THE FOLLOWING:   Fever over 101 degrees for 24 hours. Chest pain, shortness of breath, fever, chills, nausea, vomiting, diarrhea, change in mentation, falling, weakness, bleeding.  Severe pain or pain not relieved by medications. Or, any other signs or symptoms that you may have questions about. DISPOSITION:  x  Home With:   OT  PT  HH  RN       Long term SNF/Inpatient Rehab    Independent/assisted living    Hospice    Other:       PATIENT CONDITION AT DISCHARGE: Stable      PHYSICAL EXAMINATION AT DISCHARGE:  General:          Alert, cooperative, no distress, appears stated age. HEENT:           Atraumatic, anicteric sclerae, pink conjunctivae                          No oral ulcers, mucosa moist, throat clear, dentition fair  Neck:               Supple, symmetrical  Lungs:             Clear to auscultation bilaterally. No Wheezing or Rhonchi. No rales. Chest wall:      No tenderness  No Accessory muscle use. Heart:              Regular  rhythm,  No  murmur   No edema  Abdomen:        Soft, non-tender. Not distended. Bowel sounds normal  Extremities:     No cyanosis. No clubbing,                            Skin turgor normal, Capillary refill normal  Skin:                Not pale. Not Jaundiced  No rashes   Psych:             Not anxious or agitated. Neurologic:      Alert, moves all extremities, answers questions appropriately and responds to commands     XR CHEST PORT   Final Result   NG tube position as above. Worsening lung aeration. CT ABD PELV WO CONT   Final Result   No acute abdominopelvic abnormality. Nonobstructing bilateral renal   calculi. Correlate for constipation given increased stool burden. End-stage   degenerative changes of the hips with postoperative change in the right   acetabulum. Pulmonary interstitial abnormality partially visualized in the acute   setting would suggest infection/inflammation or edema versus a combination of   these and in any case with early airspace component at the right lung base. CT HEAD WO CONT   Final Result   No acute intracranial abnormality. 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  Mild generalized atrophy with nonspecific white matter abnormality that may indicate   chronic small vessel disease. 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 XR CHEST PORT   Final Result   Rotated exam. Diffuse interstitial abnormality and with question of   some superimposed nodular areas. In the acute setting edema, infection or   combination of these is considered however, especially given the possible   nodular opacities, follow-up to radiographic resolution and/or further   evaluation with chest CT if symptoms/findings do not resolve as expected with   treatment, or if indicated otherwise. Recent Results (from the past 24 hour(s))   MRSA SCREEN - PCR (NASAL)    Collection Time: 04/04/21  2:30 PM   Result Value Ref Range    MRSA by PCR, Nasal DETECTED (A) Not Detected     CBC WITH AUTOMATED DIFF    Collection Time: 04/04/21  3:36 PM   Result Value Ref Range    WBC 11.3 (H) 4.1 - 11.1 K/uL    RBC 2.64 (L) 4.10 - 5.70 M/uL    HGB 7.2 (L) 12.1 - 17.0 g/dL    HCT 23.9 (L) 36.6 - 50.3 %    MCV 90.5 80.0 - 99.0 FL    MCH 27.3 26.0 - 34.0 PG    MCHC 30.1 30.0 - 36.5 g/dL    RDW 19.2 (H) 11.5 - 14.5 %    PLATELET 159 432 - 415 K/uL    MPV 11.1 8.9 - 12.9 FL    NRBC 1.3 (H) 0  WBC    ABSOLUTE NRBC 0.15 (H) 0.00 - 0.01 K/uL    NEUTROPHILS 87 (H) 32 - 75 %    LYMPHOCYTES 5 (L) 12 - 49 %    MONOCYTES 6 5 - 13 %    EOSINOPHILS 0 0 - 7 %    BASOPHILS 0 0 - 1 %    IMMATURE GRANULOCYTES 2 (H) 0.0 - 0.5 %    ABS. NEUTROPHILS 10.1 (H) 1.8 - 8.0 K/UL    ABS. LYMPHOCYTES 0.6 (L) 0.8 - 3.5 K/UL    ABS. MONOCYTES 0.7 0.0 - 1.0 K/UL    ABS. EOSINOPHILS 0.0 0.0 - 0.4 K/UL    ABS. BASOPHILS 0.0 0.0 - 0.1 K/UL    ABS. IMM.  GRANS. 0.2 (H) 0.00 - 0.04 K/UL    DF AUTOMATED     TYPE & SCREEN    Collection Time: 04/04/21  3:36 PM   Result Value Ref Range    Crossmatch Expiration 04/07/2021,8569     ABO/Rh(D) B Positive     Antibody screen Negative     Unit number [id number]     Blood component type  LR     Unit division 00     Status of unit Issued,final     TRANSFUSION STATUS Ok to transfuse     Crossmatch result Compatible     Unit number [id number]     Blood component type [facility]     Unit division 00     Status of unit Issued,final     TRANSFUSION STATUS Ok to transfuse     Crossmatch result Compatible    CBC WITH AUTOMATED DIFF    Collection Time: 04/05/21  5:40 AM   Result Value Ref Range    WBC 11.0 4.1 - 11.1 K/uL    RBC 3.34 (L) 4.10 - 5.70 M/uL    HGB 9.3 (L) 12.1 - 17.0 g/dL    HCT 29.4 (L) 36.6 - 50.3 %    MCV 88.0 80.0 - 99.0 FL    MCH 27.8 26.0 - 34.0 PG    MCHC 31.6 30.0 - 36.5 g/dL    RDW 18.0 (H) 11.5 - 14.5 %    PLATELET 493 556 - 520 K/uL    MPV 10.8 8.9 - 12.9 FL    NRBC 1.0 (H) 0  WBC    ABSOLUTE NRBC 0.11 (H) 0.00 - 0.01 K/uL    NEUTROPHILS 89 (H) 32 - 75 %    LYMPHOCYTES 6 (L) 12 - 49 %    MONOCYTES 6 5 - 13 %    EOSINOPHILS 0 0 - 7 %    BASOPHILS 0 0 - 1 %    IMMATURE GRANULOCYTES 2 (H) 0.0 - 0.5 %    ABS. NEUTROPHILS 9.7 (H) 1.8 - 8.0 K/UL    ABS. LYMPHOCYTES 0.6 (L) 0.8 - 3.5 K/UL    ABS. MONOCYTES 0.6 0.0 - 1.0 K/UL    ABS. EOSINOPHILS 0.0 0.0 - 0.4 K/UL    ABS. BASOPHILS 0.0 0.0 - 0.1 K/UL    ABS. IMM. GRANS. 0.2 (H) 0.00 - 0.04 K/UL    DF AUTOMATED     METABOLIC PANEL, COMPREHENSIVE    Collection Time: 04/05/21  5:40 AM   Result Value Ref Range    Sodium 135 (L) 136 - 145 mmol/L    Potassium 4.3 3.5 - 5.1 mmol/L    Chloride 104 97 - 108 mmol/L    CO2 26 21 - 32 mmol/L    Anion gap 5 5 - 15 mmol/L    Glucose 167 (H) 65 - 100 mg/dL    BUN 18 6 - 20 mg/dL    Creatinine 0.91 0.70 - 1.30 mg/dL    BUN/Creatinine ratio 20 12 - 20      GFR est AA >60 >60 ml/min/1.73m2    GFR est non-AA >60 >60 ml/min/1.73m2    Calcium 7.5 (L) 8.5 - 10.1 mg/dL    Bilirubin, total 0.3 0.2 - 1.0 mg/dL    AST (SGOT) 19 15 - 37 U/L    ALT (SGPT) 41 12 - 78 U/L    Alk.  phosphatase 114 45 - 117 U/L    Protein, total 5.4 (L) 6.4 - 8.2 g/dL    Albumin 1.7 (L) 3.5 - 5.0 g/dL    Globulin 3.7 2.0 - 4.0 g/dL    A-G Ratio 0.5 (L) 1.1 - 2.2     RENAL FUNCTION PANEL    Collection Time: 04/05/21  5:40 AM   Result Value Ref Range    Sodium 136 136 - 145 mmol/L    Potassium 4.3 3.5 - 5.1 mmol/L    Chloride 105 97 - 108 mmol/L    CO2 24 21 - 32 mmol/L    Anion gap 7 5 - 15 mmol/L    Glucose 164 (H) 65 - 100 mg/dL    BUN 17 6 - 20 mg/dL    Creatinine 0.91 0.70 - 1.30 mg/dL    BUN/Creatinine ratio 19 12 - 20      GFR est AA >60 >60 ml/min/1.73m2    GFR est non-AA >60 >60 ml/min/1.73m2    Calcium 7.6 (L) 8.5 - 10.1 mg/dL    Phosphorus 1.6 (L) 2.6 - 4.7 mg/dL    Albumin 1.7 (L) 3.5 - 5.0 g/dL          HOSPITAL COURSE:       Patient is a 66y.o. year old male with significant past medical history of dementia history of stroke in the past history of COVID-19 pneumonitis in the past obstructive uropathy acute kidney injury proximal A. fib nonpressure chronic ulcer of the lower leg history of MRSA infection major depression history of acute emboli and thrombosis history of peripheral vascular disease history of hypertension hyperlipidemia history of GERD history of BPH history of CVA was transferred from the nursing home because on eating drinking well since patient was diagnosed with Covid after that stopped eating drinking  Seen by the ER physician work-up shows acute kidney injury with hyperkalemia    Patient was treated with IV fluids and also his NG tube and start feeding  Patient seen by the nephrologist during his admission  And also seen by the cardiologist regarding A.  Fib  Medication was adjusted patient was getting feeding with NG tube and NG tube was taken out GI was consulted and PEG tube was placed patient has significant amount of  Nasal bleed Heparin were discontinued and  H&H was monitored  Now remained stable patient getting PEG tube feeding tolerating well not in distress      Patient discharge was canceled as patient had massive rectal bleed transferred to ICU GI saw this patient this morningWho  plan for sigmoidoscopy/and bleeding scan  Monitor H&H closely    Follow-up sigmoidoscopy/colonoscopy results    Signed:   Yohannes Ricci MD  4/5/2021  3:17 PM

## 2021-04-05 NOTE — PROGRESS NOTES
Chart reviewed. Patient transferred to ICU due to GIB. Patient is a LTC resident at [facility]. Plan to return at discharge.

## 2021-04-05 NOTE — PROGRESS NOTES
Renal progress note    Patient: Paddy Cabot MRN: [id number]  SSN: [id number]    YOB: 1945  Age: 66 y.o. Sex: male      Subjective:   Pt is seen at bedside,, AMS+, no distress  S/p PEG placement   S/p Flex sig this am  Stable renal functions and stable electrolytes      Past Medical History:   Diagnosis Date    Anemia     BPH (benign prostatic hyperplasia)     CKD (chronic kidney disease)     [facility]     [facility]     DVT (deep venous thrombosis) (HCC)     GERD (gastroesophageal reflux disease)     Glaucoma     Hyperlipemia      History reviewed. No pertinent surgical history. History reviewed. No pertinent family history.   Social History     Tobacco Use    Smoking status: Unknown If Ever Smoked   Substance Use Topics    Alcohol use: Not Currently      Current Facility-Administered Medications   Medication Dose Route Frequency Provider Last Rate Last Admin    fentaNYL citrate (PF) injection    PRN Gabrielle Ellis MD   25 mcg at 04/05/21 0925    famotidine (PF) (PEPCID) 20 mg in 0.9% sodium chloride 10 mL injection  20 mg IntraVENous Q12H Gabrielle Ellis MD   20 mg at 04/05/21 1006    dextrose 5 % - 0.2% NaCl infusion  50 mL/hr IntraVENous CONTINUOUS Ray Simmons MD 50 mL/hr at 04/04/21 1000 50 mL/hr at 04/04/21 1000    0.9% sodium chloride infusion 250 mL  250 mL IntraVENous PRN Gabrielle Ellis MD        amiodarone (CORDARONE) tablet 200 mg  200 mg Oral DAILY Yvonne Valle MD   200 mg at 04/05/21 1003    methylPREDNISolone (PF) (SOLU-MEDROL) injection 40 mg  40 mg IntraVENous Q12H Carlene Moon MD   40 mg at 04/05/21 1006    mirtazapine (REMERON SOL-TAB) disintegrating tablet 15 mg  15 mg Oral QHS Norman Sargent MD   15 mg at 04/04/21 2200    acetaminophen (TYLENOL) tablet 650 mg  650 mg Oral Q6H PRN Jen Moon MD   650 mg at 04/03/21 2138    Or    acetaminophen (TYLENOL) suppository 650 mg  650 mg Rectal Q6H PRN Arash Monique MD        polyethylene glycol [facility]) packet 17 g  17 g Oral DAILY PRN Geovani Moon MD   17 g at 04/03/21 1042    ondansetron (ZOFRAN ODT) tablet 4 mg  4 mg Oral Q8H PRN Geovani Moon MD        Or    ondansetron [facility]) injection 4 mg  4 mg IntraVENous Q6H PRN Geovani Moon MD        cefTRIAXone (ROCEPHIN) 1 g in sterile water (preservative free) 10 mL IV syringe  1 g IntraVENous Q24H Giacomo Whittington MD   1 g at 04/03/21 1604    atorvastatin (LIPITOR) tablet 40 mg  40 mg Oral QHS Carlene Moon MD   Stopped at 04/04/21 2200        Allergies   Allergen Reactions    Flonase [Fluticasone] Unknown (comments)         Objective:     Vitals:    04/05/21 0700 04/05/21 0923 04/05/21 0930 04/05/21 0937   BP:  121/63 121/72 115/64   Pulse:  66 (!) 57 (!) 57   Resp:  18 18 15   Temp: 97.3 °F (36.3 °C)      SpO2:   99%    Weight:       Height:            Physical Exam:  General: AMS  Eyes: sclera anicteric  Oral Cavity: No thrush or ulcers  Neck: no JVD  Chest: Fair bilateral air entry  Heart: normal sounds  Abdomen: soft and non tender   :  Vazquez+  Lower Extremities: no edema  Skin: no rash  Neuro: ams            Assessment:       Plan:     1 acute kidney injury:   -Etiology is likely prerenal azotemia from volume depletion.    -On admission BUN/creatinine 207/8.1,   -Improving renal functions with IV hydration, creatinine down to 3.19---->0.9 today  -CT abdomen no evidence of hydronephrosis. -Good urine output present  -Will continue IV fluids at 50 ml/hr another 24 hours. Will discontinue once tube feeding is started    2. Hypernatremia.    -From free water deficit.    -Sodium 158-->155--157--1139--138 today   -Will continue hypotonic IVF at 50 ml/hr and monitor sodium levels    3. Hyperkalemia. Improved potassium levels with medical management  -Continue to monitor potassium levels    4. Altered mental status.    -Could be metabolic encephalopathy/UTI.     -CT head no acute process. -Urinalysis consistent with UTI.    -Continue to monitor clinically    Signed By: Aaron Boyd MD     April 5, 2021

## 2021-04-05 NOTE — PROGRESS NOTES
Progress Note    Patient: Luther Davis MRN: [id number]  SSN: [id number]    YOB: 1945  Age: 66 y.o. Sex: male      Admit Date: 3/26/2021    LOS: 9 days     Subjective:   Patient seen in ICU, awake and alert. No complains of pain. He had a flex-sigmoid done today to assess GI  Bleed. H&H today is 9.3 and 29.4. No report of further bleeding per staff. PEG intact, tolerating his tube feedings.  -Patient was admitted from the nursing facility with complains of poor oral intake. PEG tube was placed on 4/2/21. Objective:     Vitals:    04/05/21 1000 04/05/21 1100 04/05/21 1200 04/05/21 1300   BP: 115/65 (!) 113/59 (!) 119/56 (!) 104/57   Pulse: 69 65 66 63   Resp: 20 19 21 19   Temp:  98 °F (36.7 °C)     SpO2: 94% 94% 95% 94%   Weight:       Height:            Intake and Output:  Current Shift: No intake/output data recorded. Last three shifts: 04/03 1901 - 04/05 0700  In: 1950 [I.V.:1080]  Out: 700 [Urine:700]    Physical Exam:   Skin:  Extremities and face reveal no rashes. No milian erythema. HEENT: Sclerae anicteric. Extra-occular muscles are intact. No abnormal pigmentation of the lips. The neck is supple. Cardiovascular: Regular rate and rhythm. Respiratory:  Comfortable breathing with no accessory muscle use. GI:  Abdomen nondistended, soft, and nontender. No enlargement of the liver or spleen. No masses palpable. PEG in place. Rectal:  Deferred  Musculoskeletal: Extremities have good range of motion. Neurological:  Answers simple questions.    Psychiatric: Not agitated  Lymphatic:  No visible adenopathy      Lab/Data Review:  Recent Results (from the past 24 hour(s))   CBC WITH AUTOMATED DIFF    Collection Time: 04/05/21  5:40 AM   Result Value Ref Range    WBC 11.0 4.1 - 11.1 K/uL    RBC 3.34 (L) 4.10 - 5.70 M/uL    HGB 9.3 (L) 12.1 - 17.0 g/dL    HCT 29.4 (L) 36.6 - 50.3 %    MCV 88.0 80.0 - 99.0 FL    MCH 27.8 26.0 - 34.0 PG    MCHC 31.6 30.0 - 36.5 g/dL    RDW 18.0 (H) 11.5 - 14.5 %    PLATELET 823 226 - 759 K/uL    MPV 10.8 8.9 - 12.9 FL    NRBC 1.0 (H) 0  WBC    ABSOLUTE NRBC 0.11 (H) 0.00 - 0.01 K/uL    NEUTROPHILS 89 (H) 32 - 75 %    LYMPHOCYTES 6 (L) 12 - 49 %    MONOCYTES 6 5 - 13 %    EOSINOPHILS 0 0 - 7 %    BASOPHILS 0 0 - 1 %    IMMATURE GRANULOCYTES 2 (H) 0.0 - 0.5 %    ABS. NEUTROPHILS 9.7 (H) 1.8 - 8.0 K/UL    ABS. LYMPHOCYTES 0.6 (L) 0.8 - 3.5 K/UL    ABS. MONOCYTES 0.6 0.0 - 1.0 K/UL    ABS. EOSINOPHILS 0.0 0.0 - 0.4 K/UL    ABS. BASOPHILS 0.0 0.0 - 0.1 K/UL    ABS. IMM. GRANS. 0.2 (H) 0.00 - 0.04 K/UL    DF AUTOMATED     METABOLIC PANEL, COMPREHENSIVE    Collection Time: 04/05/21  5:40 AM   Result Value Ref Range    Sodium 135 (L) 136 - 145 mmol/L    Potassium 4.3 3.5 - 5.1 mmol/L    Chloride 104 97 - 108 mmol/L    CO2 26 21 - 32 mmol/L    Anion gap 5 5 - 15 mmol/L    Glucose 167 (H) 65 - 100 mg/dL    BUN 18 6 - 20 mg/dL    Creatinine 0.91 0.70 - 1.30 mg/dL    BUN/Creatinine ratio 20 12 - 20      GFR est AA >60 >60 ml/min/1.73m2    GFR est non-AA >60 >60 ml/min/1.73m2    Calcium 7.5 (L) 8.5 - 10.1 mg/dL    Bilirubin, total 0.3 0.2 - 1.0 mg/dL    AST (SGOT) 19 15 - 37 U/L    ALT (SGPT) 41 12 - 78 U/L    Alk.  phosphatase 114 45 - 117 U/L    Protein, total 5.4 (L) 6.4 - 8.2 g/dL    Albumin 1.7 (L) 3.5 - 5.0 g/dL    Globulin 3.7 2.0 - 4.0 g/dL    A-G Ratio 0.5 (L) 1.1 - 2.2     RENAL FUNCTION PANEL    Collection Time: 04/05/21  5:40 AM   Result Value Ref Range    Sodium 136 136 - 145 mmol/L    Potassium 4.3 3.5 - 5.1 mmol/L    Chloride 105 97 - 108 mmol/L    CO2 24 21 - 32 mmol/L    Anion gap 7 5 - 15 mmol/L    Glucose 164 (H) 65 - 100 mg/dL    BUN 17 6 - 20 mg/dL    Creatinine 0.91 0.70 - 1.30 mg/dL    BUN/Creatinine ratio 19 12 - 20      GFR est AA >60 >60 ml/min/1.73m2    GFR est non-AA >60 >60 ml/min/1.73m2    Calcium 7.6 (L) 8.5 - 10.1 mg/dL    Phosphorus 1.6 (L) 2.6 - 4.7 mg/dL    Albumin 1.7 (L) 3.5 - 5.0 g/dL              XR CHEST PORT   Final Result NG tube position as above. Worsening lung aeration. CT ABD PELV WO CONT   Final Result   No acute abdominopelvic abnormality. Nonobstructing bilateral renal   calculi. Correlate for constipation given increased stool burden. End-stage   degenerative changes of the hips with postoperative change in the right   acetabulum. Pulmonary interstitial abnormality partially visualized in the acute   setting would suggest infection/inflammation or edema versus a combination of   these and in any case with early airspace component at the right lung base. CT HEAD WO CONT   Final Result   No acute intracranial abnormality. 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 Mild   generalized atrophy with nonspecific white matter abnormality that may indicate   chronic small vessel disease. 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 XR CHEST PORT   Final Result   Rotated exam. Diffuse interstitial abnormality and with question of   some superimposed nodular areas. In the acute setting edema, infection or   combination of these is considered however, especially given the possible   nodular opacities, follow-up to radiographic resolution and/or further   evaluation with chest CT if symptoms/findings do not resolve as expected with   treatment, or if indicated otherwise. Assessment:     Active Problems:    Hypovolemia (3/27/2021)      Acute renal failure (ARF) (HCC) (3/27/2021)      Atrial fibrillation, rapid ([address] (3/27/2021)      JAE (acute kidney injury) ([address] (3/27/2021)        Plan:   Rectal bleeding, s/p flex-sigmoidoscopy, shows medium hemorrhoids. No evidence of active colitis, no blood or active bleeding noted. -No blood thinners for 5 days. Patient discussed with Dr Sofía Gold and agrees to above impression and plan. Thank you for allowing me to participate in this patients care    Signed By: Chelsey Goodman.  MIKEY Muller April 5, 2021

## 2021-04-06 LAB
ALBUMIN SERPL-MCNC: 2.1 G/DL (ref 3.5–5)
ALBUMIN SERPL-MCNC: 2.2 G/DL (ref 3.5–5)
ALBUMIN/GLOB SERPL: 0.6 {RATIO} (ref 1.1–2.2)
ALP SERPL-CCNC: 122 U/L (ref 45–117)
ALT SERPL-CCNC: 51 U/L (ref 12–78)
ANION GAP SERPL CALC-SCNC: 12 MMOL/L (ref 5–15)
ANION GAP SERPL CALC-SCNC: 9 MMOL/L (ref 5–15)
AST SERPL W P-5'-P-CCNC: 28 U/L (ref 15–37)
BASOPHILS # BLD: 0 K/UL (ref 0–0.1)
BASOPHILS NFR BLD: 0 % (ref 0–1)
BILIRUB SERPL-MCNC: 0.4 MG/DL (ref 0.2–1)
BUN SERPL-MCNC: 17 MG/DL (ref 6–20)
BUN SERPL-MCNC: 17 MG/DL (ref 6–20)
BUN/CREAT SERPL: 17 (ref 12–20)
BUN/CREAT SERPL: 17 (ref 12–20)
CA-I BLD-MCNC: 8.4 MG/DL (ref 8.5–10.1)
CA-I BLD-MCNC: 8.4 MG/DL (ref 8.5–10.1)
CHLORIDE SERPL-SCNC: 105 MMOL/L (ref 97–108)
CHLORIDE SERPL-SCNC: 105 MMOL/L (ref 97–108)
CO2 SERPL-SCNC: 21 MMOL/L (ref 21–32)
CO2 SERPL-SCNC: 23 MMOL/L (ref 21–32)
CREAT SERPL-MCNC: 0.99 MG/DL (ref 0.7–1.3)
CREAT SERPL-MCNC: 1 MG/DL (ref 0.7–1.3)
DIFFERENTIAL METHOD BLD: ABNORMAL
EOSINOPHIL # BLD: 0 K/UL (ref 0–0.4)
EOSINOPHIL NFR BLD: 0 % (ref 0–7)
ERYTHROCYTE [DISTWIDTH] IN BLOOD BY AUTOMATED COUNT: 18.9 % (ref 11.5–14.5)
GLOBULIN SER CALC-MCNC: 4 G/DL (ref 2–4)
GLUCOSE BLD STRIP.AUTO-MCNC: 121 MG/DL (ref 65–100)
GLUCOSE SERPL-MCNC: 92 MG/DL (ref 65–100)
GLUCOSE SERPL-MCNC: 93 MG/DL (ref 65–100)
HCT VFR BLD AUTO: 31.1 % (ref 36.6–50.3)
HGB BLD-MCNC: 9.8 G/DL (ref 12.1–17)
IMM GRANULOCYTES # BLD AUTO: 0.2 K/UL (ref 0–0.04)
IMM GRANULOCYTES NFR BLD AUTO: 1 % (ref 0–0.5)
LYMPHOCYTES # BLD: 0.6 K/UL (ref 0.8–3.5)
LYMPHOCYTES NFR BLD: 5 % (ref 12–49)
MCH RBC QN AUTO: 28.1 PG (ref 26–34)
MCHC RBC AUTO-ENTMCNC: 31.5 G/DL (ref 30–36.5)
MCV RBC AUTO: 89.1 FL (ref 80–99)
MONOCYTES # BLD: 0.4 K/UL (ref 0–1)
MONOCYTES NFR BLD: 4 % (ref 5–13)
NEUTS SEG # BLD: 10.2 K/UL (ref 1.8–8)
NEUTS SEG NFR BLD: 90 % (ref 32–75)
PERFORMED BY, TECHID: ABNORMAL
PHOSPHATE SERPL-MCNC: 1.3 MG/DL (ref 2.6–4.7)
PLATELET # BLD AUTO: 204 K/UL (ref 150–400)
PMV BLD AUTO: 11.2 FL (ref 8.9–12.9)
POTASSIUM SERPL-SCNC: 4.4 MMOL/L (ref 3.5–5.1)
POTASSIUM SERPL-SCNC: 4.4 MMOL/L (ref 3.5–5.1)
PROT SERPL-MCNC: 6.2 G/DL (ref 6.4–8.2)
RBC # BLD AUTO: 3.49 M/UL (ref 4.1–5.7)
SODIUM SERPL-SCNC: 137 MMOL/L (ref 136–145)
SODIUM SERPL-SCNC: 138 MMOL/L (ref 136–145)
WBC # BLD AUTO: 11.3 K/UL (ref 4.1–11.1)

## 2021-04-06 PROCEDURE — 80053 COMPREHEN METABOLIC PANEL: CPT

## 2021-04-06 PROCEDURE — 74011250637 HC RX REV CODE- 250/637: Performed by: FAMILY MEDICINE

## 2021-04-06 PROCEDURE — 74011000250 HC RX REV CODE- 250: Performed by: INTERNAL MEDICINE

## 2021-04-06 PROCEDURE — 74011250637 HC RX REV CODE- 250/637: Performed by: INTERNAL MEDICINE

## 2021-04-06 PROCEDURE — 74011250636 HC RX REV CODE- 250/636: Performed by: INTERNAL MEDICINE

## 2021-04-06 PROCEDURE — 82962 GLUCOSE BLOOD TEST: CPT

## 2021-04-06 PROCEDURE — 80069 RENAL FUNCTION PANEL: CPT

## 2021-04-06 PROCEDURE — 85025 COMPLETE CBC W/AUTO DIFF WBC: CPT

## 2021-04-06 PROCEDURE — 65270000029 HC RM PRIVATE

## 2021-04-06 PROCEDURE — 74011250636 HC RX REV CODE- 250/636: Performed by: FAMILY MEDICINE

## 2021-04-06 PROCEDURE — 74011250637 HC RX REV CODE- 250/637: Performed by: PSYCHIATRY & NEUROLOGY

## 2021-04-06 PROCEDURE — 36415 COLL VENOUS BLD VENIPUNCTURE: CPT

## 2021-04-06 RX ADMIN — SODIUM CHLORIDE: 9 INJECTION, SOLUTION INTRAVENOUS at 13:54

## 2021-04-06 RX ADMIN — METHYLPREDNISOLONE SODIUM SUCCINATE 40 MG: 40 INJECTION, POWDER, FOR SOLUTION INTRAMUSCULAR; INTRAVENOUS at 11:47

## 2021-04-06 RX ADMIN — FAMOTIDINE 20 MG: 10 INJECTION, SOLUTION INTRAVENOUS at 11:46

## 2021-04-06 RX ADMIN — METHYLPREDNISOLONE SODIUM SUCCINATE 40 MG: 40 INJECTION, POWDER, FOR SOLUTION INTRAMUSCULAR; INTRAVENOUS at 22:27

## 2021-04-06 RX ADMIN — CEFTRIAXONE SODIUM 1 G: 1 INJECTION, POWDER, FOR SOLUTION INTRAMUSCULAR; INTRAVENOUS at 13:33

## 2021-04-06 RX ADMIN — FAMOTIDINE 20 MG: 10 INJECTION, SOLUTION INTRAVENOUS at 22:27

## 2021-04-06 RX ADMIN — ATORVASTATIN CALCIUM 40 MG: 40 TABLET, FILM COATED ORAL at 22:19

## 2021-04-06 RX ADMIN — DIBASIC SODIUM PHOSPHATE, MONOBASIC POTASSIUM PHOSPHATE AND MONOBASIC SODIUM PHOSPHATE 2 TABLET: 852; 155; 130 TABLET ORAL at 12:00

## 2021-04-06 RX ADMIN — DEXTROSE MONOHYDRATE AND SODIUM CHLORIDE 50 ML/HR: 5; .225 INJECTION, SOLUTION INTRAVENOUS at 03:20

## 2021-04-06 RX ADMIN — AMIODARONE HYDROCHLORIDE 200 MG: 200 TABLET ORAL at 11:46

## 2021-04-06 RX ADMIN — DIBASIC SODIUM PHOSPHATE, MONOBASIC POTASSIUM PHOSPHATE AND MONOBASIC SODIUM PHOSPHATE 2 TABLET: 852; 155; 130 TABLET ORAL at 22:26

## 2021-04-06 RX ADMIN — MIRTAZAPINE 15 MG: 15 TABLET, ORALLY DISINTEGRATING ORAL at 22:19

## 2021-04-06 NOTE — PROGRESS NOTES
Comprehensive Nutrition Assessment    Type and Reason for Visit: Reassess(Interim)    Nutrition Recommendations/Plan:     Re-consult SLP as needed for pleasure PO diet    Continue oral care    Continue TF of Jevity 1.5 at goal of 55mL/hr, continuous   Flush with 210ml water q4hr or per MD         Provides 1980kcals, 84g pro, 2263ml fluid (Meets 95% EENs, 99% protein, and 100% fluid needs)  *D5 (50ml/hr) providing 204kcal/d    Document TF rate, flush, GRVs, BMs in I/Os    Nutrition Assessment:  Admitted for dehydration and poor PO, +JAE, likely UTI. RD consulted for poor PO, pt initially inappropriate for interview d/t poor PO pta and since admit, as well as SLP rec for NPO with alternate source nutrition, RD rec'd NGT placement, completed (3/28). SLP attempted re-eval however pt refusing PO and oral care. TF running x2 days, poor documentation however likely at goal x1 day prior to removal d/t epistaxis. S/p Flex sig (4/5). Noted PO intakes minimal while diet ordered. S/p PEG placement 4/2, with very limited EMR documentation of TF. Unfortunately, per discussion with RN, pt TF not advanced beyond 30mL/hr despite orders to advance to goal. RD discussed in detail with care team, updated order, TF infusing and being advanced appropriately. RN denied issues with tolerance. Continues to receive some kcal from D5. Labs: H/H 9.8/31.1, Ca 8.4, Alk Phos 122, Phos 1.3. Meds: D5 50mL/hr, ceftriaxone, statin, solu-medrol, mirtazapine, amiodarone, PRN antiemetics, PRN miralax, K phos. Malnutrition Assessment:  Malnutrition Status:  Severe malnutrition    Context:  Acute illness       Estimated Daily Nutrient Needs:  Energy (kcal): 2079kcals (30kcals/kg); Weight Used for Energy Requirements: Current  Protein (g): 83g (1.2g/kg);  Weight Used for Protein Requirements: Current  Fluid (ml/day): 2079ml; Method Used for Fluid Requirements: 1 ml/kcal      Nutrition Related Findings:  NFPE finding severe temporal wasting, moderate muscle wasting otherwise. Per hard chart pt on puree/NTL pta, SLP following. No N/V/D/C, last BM 4/6. +1 genital edema. Wounds:    Skin tears, Deep tissue injury(DTI R hip; L Knee Erythema; Skin tear/split to distal head of penis)       Current Nutrition Therapies:  DIET NPO  DIET TUBE FEEDING Jevity 1.5 Goal 55mL/hr + 210mL q4h  Current Tube Feeding (TF) Orders:   · Feeding Route: PEG  · Formula: Jevity 1.5  · Schedule:Continuous    · Regimen: 30mL/hr  · Additives/Modulars:  none  · Water Flushes: 210 q4h  · Current TF & Flush Orders Provides: insufficient  · Goal TF & Flush Orders Provides: Continue advancing TF to goal of Jevity 1.5 at 55ml/hr, Flush with 210ml water q4hrs; Provides 1980kcal, 84g pro, 2263mL fluid      Anthropometric Measures:  · Height:  6' 2.02\" (188 cm)  · Current Body Wt:  69.3 kg (152 lb 12.5 oz)(3/31)   · Admission Body Wt:  125 lb(3/26)    · Usual Body Wt:  (angle)     · Ideal Body Wt:  190 lbs:  80.4 %   · BMI Category:  Underweight (BMI less than 18.5)     Wt hx: 67.4kg (4/4), 69.3kg (3/31), 56.7kg (3/26- estimated)  No prior wt hx in EMR to assess, pt unable to give wt hx as well.     Nutrition Diagnosis:   · Severe malnutrition related to inadequate protein-energy intake as evidenced by poor intake prior to admission, lab values(indicative of dehydration)      Nutrition Interventions:   Food and/or Nutrient Delivery: Continue current diet, Start tube feeding(PO for comfort per SLP)  Nutrition Education and Counseling: No recommendations at this time, Education not appropriate  Coordination of Nutrition Care: Continue to monitor while inpatient, Feeding assistance/environmental change, Speech therapy    Goals:  intakes >/=50% of EENs in 5 days, wt maintenance while acutely ill within +/-0.5kg in 5 days, resume(?) ability to consume PO in7 days       Nutrition Monitoring and Evaluation:   Behavioral-Environmental Outcomes: None identified  Food/Nutrient Intake Outcomes: Diet advancement/tolerance, Food and nutrient intake, Enteral nutrition intake/tolerance  Physical Signs/Symptoms Outcomes: Biochemical data, Chewing or swallowing, Meal time behavior, Fluid status or edema, Nutrition focused physical findings, Weight, Skin    Discharge Planning:    Enteral nutrition     Electronically signed by De Hart on 4/6/2021 at 3:21 PM    Contact: [phone] or via [email or web address]

## 2021-04-06 NOTE — DISCHARGE SUMMARY
Discharge Summary       PATIENT ID: Efrain Olmstead  MRN: [id number]   YOB: 1945    DATE OF ADMISSION: 3/26/2021  8:44 PM    DATE OF DISCHARGE:   PRIMARY CARE PROVIDER: Marie Merrill MD     ATTENDING PHYSICIAN: Ashish Moon  DISCHARGING PROVIDER: Ashish Moon      CONSULTATIONS: IP CONSULT TO CARDIOLOGY  IP CONSULT TO NEPHROLOGY  IP CONSULT TO NEPHROLOGY  IP CONSULT TO NEUROLOGY  IP CONSULT TO PSYCHIATRY  IP CONSULT TO GASTROENTEROLOGY  IP CONSULT TO GASTROENTEROLOGY    PROCEDURES/SURGERIES: Procedure(s):  SIGMOIDOSCOPY    ADMITTING DIAGNOSES:    Patient Active Problem List    Diagnosis Date Noted    Hypovolemia 03/27/2021    Acute renal failure (ARF) ([facility] 03/27/2021    Atrial fibrillation, rapid ([facility] 03/27/2021    JAE (acute kidney injury) ([facility] 03/27/2021       ADDENDUM 4/7/2021: PATIENT WITHOUT DISTRESS OR CHANGE FROM BASELINE MENTATION  CBC WITH NO LEUKOCYTOSIS. HEMOGLOBIN AT BASELINE. CHEMISTRY UNREMARKABLE. RENAL FUNCTION WITHOUT EVIDENCE OF JAE. NO TACHYCARDIA. TF WITHOUT DIFFICULTY OR RESIDUAL  BOSS CATHETER TO BE REMOVED PRIOR TO DISCHARGE. URINE CULTURE AND BLOOD CULTURES WITH NO GROWTH FINAL  MRSA NARES SCREEN POSITIVE - MUPIROCIN ORDERED X 5 DAYS  REVIEWED ALL LABS, CULTURES AND IMAGING PRIOR TO DISCHARGE    DISCUSSED WITH DR. Rubina Padron    CASE MANAGEMENT ASSISTING WITH DISCHARGE BACK TO SNF.         DISCHARGE DIAGNOSES / PLAN:      A. fib with rapid ventricular rate       Acute kidney injury       Acute Blood Loss Anemia secondary to bleeding from nose, mouth       Hypokalemia       Hypernatremia    UTI/negative culture     History of recent Covid     History of obstructive uropathy     Dementia     Functional quadriplegic     Dermatitis     History of CVA     History of DVT     Hyperlipidemia        DISCHARGE MEDICATIONS:  Current Discharge Medication List      START taking these medications    Details   phosphorus (K PHOS NEUTRAL) 250 mg tablet Take 2 Tabs by mouth two (2) times a day for 2 days. Qty: 8 Tab, Refills: 0      mupirocin (BACTROBAN) 2 % ointment Apply  to affected area daily. Qty: 22 g, Refills: 0      amiodarone (CORDARONE) 200 mg tablet Take 1 Tab by mouth daily. Qty: 30 Tab, Refills: 0      aspirin (ASPIRIN) 325 mg tablet Take 1 Tab by mouth daily. Qty: 30 Tab, Refills: 0      atorvastatin (LIPITOR) 40 mg tablet Take 1 Tab by mouth nightly. Qty: 30 Tab, Refills: 0      predniSONE (DELTASONE) 10 mg tablet Tablet daily for 10 days  Qty: 10 Tab, Refills: 0               NOTIFY YOUR PHYSICIAN FOR ANY OF THE FOLLOWING:   Fever over 101 degrees for 24 hours. Chest pain, shortness of breath, fever, chills, nausea, vomiting, diarrhea, change in mentation, falling, weakness, bleeding. Severe pain or pain not relieved by medications. Or, any other signs or symptoms that you may have questions about. DISPOSITION:  x  Home With:   OT  PT  HH  RN      X Long term SNF/Inpatient Rehab    Independent/assisted living    Hospice    Other:       PATIENT CONDITION AT DISCHARGE: Stable      PHYSICAL EXAMINATION AT DISCHARGE:  General:          Alert, cooperative, no distress, chronically ill-appearing   HEENT:           Atraumatic, anicteric sclerae, pink conjunctivae                          No oral ulcers, mucosa moist, throat clear, dentition fair  Neck:               Supple, symmetrical  Lungs:             Clear to auscultation bilaterally. No Wheezing or Rhonchi. No rales. Chest wall:      No tenderness  No Accessory muscle use. Heart:              Regular  rhythm,  No  murmur   No edema  Abdomen:        Soft, non-tender. Not distended. Bowel sounds normal. Peg tube  Extremities:     No cyanosis. No clubbing,                            Skin turgor normal, Capillary refill normal  Skin:                Not pale. Not Jaundiced  No rashes   Psych:             Not anxious or agitated.   Neurologic:      Alert, moves all extremities, at baseline confusion    XR CHEST PORT   Final Result   NG tube position as above. Worsening lung aeration. CT ABD PELV WO CONT   Final Result   No acute abdominopelvic abnormality. Nonobstructing bilateral renal   calculi. Correlate for constipation given increased stool burden. End-stage   degenerative changes of the hips with postoperative change in the right   acetabulum. Pulmonary interstitial abnormality partially visualized in the acute   setting would suggest infection/inflammation or edema versus a combination of   these and in any case with early airspace component at the right lung base. CT HEAD WO CONT   Final Result   No acute intracranial abnormality. 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 Mild   generalized atrophy with nonspecific white matter abnormality that may indicate   chronic small vessel disease. 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 XR CHEST PORT   Final Result   Rotated exam. Diffuse interstitial abnormality and with question of   some superimposed nodular areas. In the acute setting edema, infection or   combination of these is considered however, especially given the possible   nodular opacities, follow-up to radiographic resolution and/or further   evaluation with chest CT if symptoms/findings do not resolve as expected with   treatment, or if indicated otherwise.                 Recent Results (from the past 24 hour(s))   GLUCOSE, POC    Collection Time: 04/06/21  7:52 PM   Result Value Ref Range    Glucose (POC) 121 (H) 65 - 100 mg/dL    Performed by Alfonza Prime    CBC WITH AUTOMATED DIFF    Collection Time: 04/07/21  7:28 AM   Result Value Ref Range    WBC 6.1 4.1 - 11.1 K/uL    RBC 3.31 (L) 4.10 - 5.70 M/uL    HGB 9.1 (L) 12.1 - 17.0 g/dL    HCT 29.2 (L) 36.6 - 50.3 %    MCV 88.2 80.0 - 99.0 FL    MCH 27.5 26.0 - 34.0 PG    MCHC 31.2 30.0 - 36.5 g/dL    RDW 18.7 (H) 11.5 - 14.5 %    PLATELET 190 979 - 378 K/uL    MPV 10.5 8.9 - 12.9 FL    NEUTROPHILS 88 (H) 32 - 75 %    LYMPHOCYTES 7 (L) 12 - 49 %    MONOCYTES 4 (L) 5 - 13 %    EOSINOPHILS 0 0 - 7 %    BASOPHILS 0 0 - 1 %    IMMATURE GRANULOCYTES 1 (H) 0.0 - 0.5 %    ABS. NEUTROPHILS 5.5 1.8 - 8.0 K/UL    ABS. LYMPHOCYTES 0.4 (L) 0.8 - 3.5 K/UL    ABS. MONOCYTES 0.3 0.0 - 1.0 K/UL    ABS. EOSINOPHILS 0.0 0.0 - 0.4 K/UL    ABS. BASOPHILS 0.0 0.0 - 0.1 K/UL    ABS. IMM. GRANS. 0.1 (H) 0.00 - 0.04 K/UL    DF AUTOMATED     METABOLIC PANEL, COMPREHENSIVE    Collection Time: 04/07/21  7:28 AM   Result Value Ref Range    Sodium 137 136 - 145 mmol/L    Potassium 4.0 3.5 - 5.1 mmol/L    Chloride 103 97 - 108 mmol/L    CO2 27 21 - 32 mmol/L    Anion gap 7 5 - 15 mmol/L    Glucose 138 (H) 65 - 100 mg/dL    BUN 16 6 - 20 mg/dL    Creatinine 0.67 (L) 0.70 - 1.30 mg/dL    BUN/Creatinine ratio 24 (H) 12 - 20      GFR est AA >60 >60 ml/min/1.73m2    GFR est non-AA >60 >60 ml/min/1.73m2    Calcium 7.8 (L) 8.5 - 10.1 mg/dL    Bilirubin, total 0.3 0.2 - 1.0 mg/dL    AST (SGOT) 30 15 - 37 U/L    ALT (SGPT) 56 12 - 78 U/L    Alk.  phosphatase 123 (H) 45 - 117 U/L    Protein, total 5.8 (L) 6.4 - 8.2 g/dL    Albumin 2.1 (L) 3.5 - 5.0 g/dL    Globulin 3.7 2.0 - 4.0 g/dL    A-G Ratio 0.6 (L) 1.1 - 2.2            HOSPITAL COURSE:       Patient is a 66y.o. year old male with significant past medical history of dementia history of stroke in the past history of COVID-19 pneumonitis in the past obstructive uropathy acute kidney injury proximal A. fib nonpressure chronic ulcer of the lower leg history of MRSA infection major depression history of acute emboli and thrombosis history of peripheral vascular disease history of hypertension hyperlipidemia history of GERD history of BPH history of CVA was transferred from the nursing home because on eating drinking well since patient was diagnosed with Covid after that stopped eating drinking  Seen by the ER physician work-up shows acute kidney injury with hyperkalemia    Patient was treated with IV fluids and also his NG tube and start feeding  Patient seen by the nephrologist during his admission  And also seen by the cardiologist regarding A.  Fib  Medication was adjusted patient was getting feeding with NG tube and NG tube was taken out GI was consulted and PEG tube was placed patient has significant amount of  Nasal bleed Heparin were discontinued and HH H&H was monitored  Now remained stable patient getting PEG tube feeding tolerating well not in distress      Patient have sigmoidoscopy shows hemorrhoids no active bleeding if patient remains stable possible discharge back to nursing home tomorrow    Signed:   Unknown MIKEY Guillory  4/7/2021  3:17 PM

## 2021-04-06 NOTE — PROGRESS NOTES
Progress Note    Patient: Adin Del Cid MRN: [id number]  SSN: [id number]    YOB: 1945  Age: 66 y.o. Sex: male      Admit Date: 3/26/2021    LOS: 10 days     Subjective:   Patient seen in room. Awake, confused. He has a PEG tube placed on 4/2/21, tolerating tube feedings well. Patient had an episode of rectal bleeding. Flex--sigmoid shows medium hemorrhoids, no evidence of active colitis, no active bleeding noted. H&H today  9.8 and 31.1. No report of further bleeding. Objective:     Vitals:    04/06/21 0400 04/06/21 0800 04/06/21 0847 04/06/21 1200   BP:   (!) 109/59    Pulse: (!) 160 92 97 96   Resp:   16    Temp:   (!) 95 °F (35 °C)    SpO2:   98%    Weight:       Height:            Intake and Output:  Current Shift: 04/06 0701 - 04/06 1900  In: 1412   Out: 1   Last three shifts: 04/04 1901 - 04/06 0700  In: 1700 [I.V.:1080]  Out: 2000 [Urine:2000]    Physical Exam:   Skin:  Extremities and face reveal no rashes. No milian erythema. HEENT: Sclerae anicteric. Extra-occular muscles are intact. No abnormal pigmentation of the lips. The neck is supple. Cardiovascular: Regular rate and rhythm. Respiratory:  Comfortable breathing with no accessory muscle use. GI:  Abdomen nondistended, soft, and nontender. No enlargement of the liver or spleen. No masses palpable. PEG inplace  Rectal:  Deferred  Musculoskeletal: Extremities have good range of motion. Neurological:  Gross memory appears intact. Patient is alert and oriented. Psychiatric:  Mood appears appropriate with judgement intact.   Lymphatic:  No visible adenopathy      Lab/Data Review:  Recent Results (from the past 24 hour(s))   RENAL FUNCTION PANEL    Collection Time: 04/06/21  7:40 AM   Result Value Ref Range    Sodium 137 136 - 145 mmol/L    Potassium 4.4 3.5 - 5.1 mmol/L    Chloride 105 97 - 108 mmol/L    CO2 23 21 - 32 mmol/L    Anion gap 9 5 - 15 mmol/L    Glucose 92 65 - 100 mg/dL    BUN 17 6 - 20 mg/dL Creatinine 0.99 0.70 - 1.30 mg/dL    BUN/Creatinine ratio 17 12 - 20      GFR est AA >60 >60 ml/min/1.73m2    GFR est non-AA >60 >60 ml/min/1.73m2    Calcium 8.4 (L) 8.5 - 10.1 mg/dL    Phosphorus 1.3 (L) 2.6 - 4.7 mg/dL    Albumin 2.1 (L) 3.5 - 5.0 g/dL   CBC WITH AUTOMATED DIFF    Collection Time: 04/06/21  7:40 AM   Result Value Ref Range    WBC 11.3 (H) 4.1 - 11.1 K/uL    RBC 3.49 (L) 4.10 - 5.70 M/uL    HGB 9.8 (L) 12.1 - 17.0 g/dL    HCT 31.1 (L) 36.6 - 50.3 %    MCV 89.1 80.0 - 99.0 FL    MCH 28.1 26.0 - 34.0 PG    MCHC 31.5 30.0 - 36.5 g/dL    RDW 18.9 (H) 11.5 - 14.5 %    PLATELET 724 319 - 132 K/uL    MPV 11.2 8.9 - 12.9 FL    NEUTROPHILS 90 (H) 32 - 75 %    LYMPHOCYTES 5 (L) 12 - 49 %    MONOCYTES 4 (L) 5 - 13 %    EOSINOPHILS 0 0 - 7 %    BASOPHILS 0 0 - 1 %    IMMATURE GRANULOCYTES 1 (H) 0.0 - 0.5 %    ABS. NEUTROPHILS 10.2 (H) 1.8 - 8.0 K/UL    ABS. LYMPHOCYTES 0.6 (L) 0.8 - 3.5 K/UL    ABS. MONOCYTES 0.4 0.0 - 1.0 K/UL    ABS. EOSINOPHILS 0.0 0.0 - 0.4 K/UL    ABS. BASOPHILS 0.0 0.0 - 0.1 K/UL    ABS. IMM. GRANS. 0.2 (H) 0.00 - 0.04 K/UL    DF AUTOMATED     METABOLIC PANEL, COMPREHENSIVE    Collection Time: 04/06/21  7:40 AM   Result Value Ref Range    Sodium 138 136 - 145 mmol/L    Potassium 4.4 3.5 - 5.1 mmol/L    Chloride 105 97 - 108 mmol/L    CO2 21 21 - 32 mmol/L    Anion gap 12 5 - 15 mmol/L    Glucose 93 65 - 100 mg/dL    BUN 17 6 - 20 mg/dL    Creatinine 1.00 0.70 - 1.30 mg/dL    BUN/Creatinine ratio 17 12 - 20      GFR est AA >60 >60 ml/min/1.73m2    GFR est non-AA >60 >60 ml/min/1.73m2    Calcium 8.4 (L) 8.5 - 10.1 mg/dL    Bilirubin, total 0.4 0.2 - 1.0 mg/dL    AST (SGOT) 28 15 - 37 U/L    ALT (SGPT) 51 12 - 78 U/L    Alk. phosphatase 122 (H) 45 - 117 U/L    Protein, total 6.2 (L) 6.4 - 8.2 g/dL    Albumin 2.2 (L) 3.5 - 5.0 g/dL    Globulin 4.0 2.0 - 4.0 g/dL    A-G Ratio 0.6 (L) 1.1 - 2.2                XR CHEST PORT   Final Result   NG tube position as above.       Worsening lung aeration. CT ABD PELV WO CONT   Final Result   No acute abdominopelvic abnormality. Nonobstructing bilateral renal   calculi. Correlate for constipation given increased stool burden. End-stage   degenerative changes of the hips with postoperative change in the right   acetabulum. Pulmonary interstitial abnormality partially visualized in the acute   setting would suggest infection/inflammation or edema versus a combination of   these and in any case with early airspace component at the right lung base. CT HEAD WO CONT   Final Result   No acute intracranial abnormality. 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 Mild   generalized atrophy with nonspecific white matter abnormality that may indicate   chronic small vessel disease. 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 XR CHEST PORT   Final Result   Rotated exam. Diffuse interstitial abnormality and with question of   some superimposed nodular areas. In the acute setting edema, infection or   combination of these is considered however, especially given the possible   nodular opacities, follow-up to radiographic resolution and/or further   evaluation with chest CT if symptoms/findings do not resolve as expected with   treatment, or if indicated otherwise. Assessment:     Active Problems:    Hypovolemia (3/27/2021)      Acute renal failure (ARF) (HCC) (3/27/2021)      Atrial fibrillation, rapid ([address] (3/27/2021)      JAE (acute kidney injury) ([address] (3/27/2021)        Plan:   GI bleed. EGD showed no bleeding noted. H&H stable. Continue to monitor H&H, transfuse as needed. Follow up as outpatient. GI will now sign off. Please re-consult as needed. Patient discussed with Dr García Aldrich and agrees to above impression and plan. Thank you for allowing me to participate in this patients care    Signed By: Sunil Muller NP     April 6, 2021

## 2021-04-06 NOTE — WOUND CARE
Duplicate WCN consult. No new wounds per [facility]. I evaluated right hip wound on 3/28/21 and 3/31/21. Order is in place for dressing change. Patient on Miles mattress with pressure reduction benefit. Continue to turn and reposition q2h using foam wedge. Keep heel boots on while in bed for offloading of heels. Will continue to follow.

## 2021-04-06 NOTE — PROGRESS NOTES
Renal progress note    Patient: Faviola Garcia MRN: [id number]  SSN: [id number]    YOB: 1945  Age: 66 y.o. Sex: male      Subjective:   Pt is seen at bedside,, AMS+, no distress  S/p PEG placement   S/p Flex sig yest  Stable renal functions and stable electrolytes      Past Medical History:   Diagnosis Date    Anemia     BPH (benign prostatic hyperplasia)     CKD (chronic kidney disease)     [facility]     [facility]     DVT (deep venous thrombosis) (HCC)     GERD (gastroesophageal reflux disease)     Glaucoma     Hyperlipemia      History reviewed. No pertinent surgical history. History reviewed. No pertinent family history.   Social History     Tobacco Use    Smoking status: Unknown If Ever Smoked   Substance Use Topics    Alcohol use: Not Currently      Current Facility-Administered Medications   Medication Dose Route Frequency Provider Last Rate Last Admin    potassium phosphate 15 mmol in 0.9% sodium chloride 250 mL infusion   IntraVENous Gary Perkins MD   Given at 04/06/21 1354    phosphorus (K PHOS NEUTRAL) 250 mg tablet 2 Tab  2 Tab Oral BID Hao Mcbride MD   2 Tab at 04/06/21 1200    fentaNYL citrate (PF) injection    PRN Adonay Somers MD   25 mcg at 04/05/21 0925    famotidine (PF) (PEPCID) 20 mg in 0.9% sodium chloride 10 mL injection  20 mg IntraVENous Q12H Adonay Somers MD   20 mg at 04/06/21 1146    dextrose 5 % - 0.2% NaCl infusion  50 mL/hr IntraVENous CONTINUOUS Ray Simmons MD 50 mL/hr at 04/06/21 0320 50 mL/hr at 04/06/21 0320    0.9% sodium chloride infusion 250 mL  250 mL IntraVENous PRN Adonay Somers MD        amiodarone (CORDARONE) tablet 200 mg  200 mg Oral DAILY Sabine Stoddard MD   200 mg at 04/06/21 1146    methylPREDNISolone (PF) (SOLU-MEDROL) injection 40 mg  40 mg IntraVENous Q12H Carlene Moon MD   40 mg at 04/06/21 1147    mirtazapine (REMERON SOL-TAB) disintegrating tablet 15 mg  15 mg Oral QHS Petra Beach MD   15 mg at 04/05/21 2159    acetaminophen (TYLENOL) tablet 650 mg  650 mg Oral Q6H PRN Jah Moon MD   650 mg at 04/03/21 2138    Or    acetaminophen (TYLENOL) suppository 650 mg  650 mg Rectal Q6H PRN Jah Moon MD        polyethylene glycol (MIRALAX) packet 17 g  17 g Oral DAILY PRN Jah Moon MD   17 g at 04/03/21 1042    ondansetron (ZOFRAN ODT) tablet 4 mg  4 mg Oral Q8H PRN Jah Moon MD        Or    [facility] injection 4 mg  4 mg IntraVENous Q6H PRN Jah Moon MD        cefTRIAXone (ROCEPHIN) 1 g in sterile water (preservative free) 10 mL IV syringe  1 g IntraVENous Q24H Carina Urbano MD   1 g at 04/06/21 1333    atorvastatin (LIPITOR) tablet 40 mg  40 mg Oral QHS Carlene Moon MD   40 mg at 04/05/21 2159        Allergies   Allergen Reactions    Flonase [Fluticasone] Unknown (comments)         Objective:     Vitals:    04/06/21 0400 04/06/21 0800 04/06/21 0847 04/06/21 1200   BP:   (!) 109/59    Pulse: (!) 160 92 97 96   Resp:   16    Temp:   (!) 95 °F (35 °C)    SpO2:   98%    Weight:       Height:            Physical Exam:  General: AMS  Eyes: sclera anicteric  Oral Cavity: No thrush or ulcers  Neck: no JVD  Chest: Fair bilateral air entry  Heart: normal sounds  Abdomen: soft and non tender   :  Vazquez+  Lower Extremities: no edema  Skin: no rash  Neuro: ams            Assessment:       Plan:     1 acute kidney injury:   -Etiology is likely prerenal azotemia from volume depletion.    -On admission BUN/creatinine 207/8.1,   -Improving renal functions with IV hydration, creatinine down to 3.19---->0.9 today  -CT abdomen no evidence of hydronephrosis. -Good urine output present  -I will discontinue IV fluids    2. Hypernatremia.    -From free water deficit.    -Sodium 158-->155--157--138 today   -Will discontinue hypotonic IVF  and monitor sodium levels    3. Hyperkalemia.   Improved potassium levels with medical management  -Continue to monitor potassium levels    4. Altered mental status.    -Could be metabolic encephalopathy/UTI. -CT head no acute process. -Urinalysis consistent with UTI.    -Continue to monitor clinically    Signed By: Macy Nguyen MD     April 6, 2021

## 2021-04-06 NOTE — PROGRESS NOTES
Patient heart rate in the 160s, informed by tele, confirmed manually. Patient noted to be agitated and confused, trying to get out of bed. Dr. Janel Mendoza informed, order received and given for IM Haldol. Dr. Yara Mishra (cardiologist) informed, to monitor patient and retrieve strip from tele.

## 2021-04-07 VITALS
OXYGEN SATURATION: 97 % | WEIGHT: 148.59 LBS | HEART RATE: 92 BPM | RESPIRATION RATE: 17 BRPM | SYSTOLIC BLOOD PRESSURE: 126 MMHG | DIASTOLIC BLOOD PRESSURE: 82 MMHG | BODY MASS INDEX: 19.07 KG/M2 | TEMPERATURE: 97.6 F | HEIGHT: 74 IN

## 2021-04-07 LAB
ALBUMIN SERPL-MCNC: 2.1 G/DL (ref 3.5–5)
ALBUMIN/GLOB SERPL: 0.6 {RATIO} (ref 1.1–2.2)
ALP SERPL-CCNC: 123 U/L (ref 45–117)
ALT SERPL-CCNC: 56 U/L (ref 12–78)
ANION GAP SERPL CALC-SCNC: 7 MMOL/L (ref 5–15)
AST SERPL W P-5'-P-CCNC: 30 U/L (ref 15–37)
BASOPHILS # BLD: 0 K/UL (ref 0–0.1)
BASOPHILS NFR BLD: 0 % (ref 0–1)
BILIRUB SERPL-MCNC: 0.3 MG/DL (ref 0.2–1)
BUN SERPL-MCNC: 16 MG/DL (ref 6–20)
BUN/CREAT SERPL: 24 (ref 12–20)
CA-I BLD-MCNC: 7.8 MG/DL (ref 8.5–10.1)
CHLORIDE SERPL-SCNC: 103 MMOL/L (ref 97–108)
CO2 SERPL-SCNC: 27 MMOL/L (ref 21–32)
CREAT SERPL-MCNC: 0.67 MG/DL (ref 0.7–1.3)
DIFFERENTIAL METHOD BLD: ABNORMAL
EOSINOPHIL # BLD: 0 K/UL (ref 0–0.4)
EOSINOPHIL NFR BLD: 0 % (ref 0–7)
ERYTHROCYTE [DISTWIDTH] IN BLOOD BY AUTOMATED COUNT: 18.7 % (ref 11.5–14.5)
GLOBULIN SER CALC-MCNC: 3.7 G/DL (ref 2–4)
GLUCOSE SERPL-MCNC: 138 MG/DL (ref 65–100)
HCT VFR BLD AUTO: 29.2 % (ref 36.6–50.3)
HGB BLD-MCNC: 9.1 G/DL (ref 12.1–17)
IMM GRANULOCYTES # BLD AUTO: 0.1 K/UL (ref 0–0.04)
IMM GRANULOCYTES NFR BLD AUTO: 1 % (ref 0–0.5)
LYMPHOCYTES # BLD: 0.4 K/UL (ref 0.8–3.5)
LYMPHOCYTES NFR BLD: 7 % (ref 12–49)
MCH RBC QN AUTO: 27.5 PG (ref 26–34)
MCHC RBC AUTO-ENTMCNC: 31.2 G/DL (ref 30–36.5)
MCV RBC AUTO: 88.2 FL (ref 80–99)
MONOCYTES # BLD: 0.3 K/UL (ref 0–1)
MONOCYTES NFR BLD: 4 % (ref 5–13)
NEUTS SEG # BLD: 5.5 K/UL (ref 1.8–8)
NEUTS SEG NFR BLD: 88 % (ref 32–75)
PLATELET # BLD AUTO: 202 K/UL (ref 150–400)
PMV BLD AUTO: 10.5 FL (ref 8.9–12.9)
POTASSIUM SERPL-SCNC: 4 MMOL/L (ref 3.5–5.1)
PROT SERPL-MCNC: 5.8 G/DL (ref 6.4–8.2)
RBC # BLD AUTO: 3.31 M/UL (ref 4.1–5.7)
SODIUM SERPL-SCNC: 137 MMOL/L (ref 136–145)
WBC # BLD AUTO: 6.1 K/UL (ref 4.1–11.1)

## 2021-04-07 PROCEDURE — 74011250637 HC RX REV CODE- 250/637: Performed by: INTERNAL MEDICINE

## 2021-04-07 PROCEDURE — 74011250636 HC RX REV CODE- 250/636: Performed by: INTERNAL MEDICINE

## 2021-04-07 PROCEDURE — 80053 COMPREHEN METABOLIC PANEL: CPT

## 2021-04-07 PROCEDURE — 74011250636 HC RX REV CODE- 250/636: Performed by: FAMILY MEDICINE

## 2021-04-07 PROCEDURE — 74011250637 HC RX REV CODE- 250/637: Performed by: NURSE PRACTITIONER

## 2021-04-07 PROCEDURE — 85025 COMPLETE CBC W/AUTO DIFF WBC: CPT

## 2021-04-07 PROCEDURE — 74011000250 HC RX REV CODE- 250: Performed by: INTERNAL MEDICINE

## 2021-04-07 PROCEDURE — 36415 COLL VENOUS BLD VENIPUNCTURE: CPT

## 2021-04-07 RX ORDER — MUPIROCIN 20 MG/G
OINTMENT TOPICAL DAILY
Status: DISCONTINUED | OUTPATIENT
Start: 2021-04-07 | End: 2021-04-08 | Stop reason: HOSPADM

## 2021-04-07 RX ORDER — MUPIROCIN 20 MG/G
OINTMENT TOPICAL DAILY
Qty: 22 G | Refills: 0 | Status: SHIPPED | OUTPATIENT
Start: 2021-04-07

## 2021-04-07 RX ADMIN — DIBASIC SODIUM PHOSPHATE, MONOBASIC POTASSIUM PHOSPHATE AND MONOBASIC SODIUM PHOSPHATE 2 TABLET: 852; 155; 130 TABLET ORAL at 09:11

## 2021-04-07 RX ADMIN — MUPIROCIN: 20 OINTMENT TOPICAL at 14:27

## 2021-04-07 RX ADMIN — FAMOTIDINE 20 MG: 10 INJECTION, SOLUTION INTRAVENOUS at 09:10

## 2021-04-07 RX ADMIN — CEFTRIAXONE SODIUM 1 G: 1 INJECTION, POWDER, FOR SOLUTION INTRAMUSCULAR; INTRAVENOUS at 14:27

## 2021-04-07 RX ADMIN — AMIODARONE HYDROCHLORIDE 200 MG: 200 TABLET ORAL at 09:10

## 2021-04-07 RX ADMIN — METHYLPREDNISOLONE SODIUM SUCCINATE 40 MG: 40 INJECTION, POWDER, FOR SOLUTION INTRAMUSCULAR; INTRAVENOUS at 09:10

## 2021-04-07 NOTE — PROGRESS NOTES
1115: Patient has new order to discontinue the echeverria catheter. This nurse was told on report that the patient came in with the echeverria catheter and it's chronic, it also says the patient came in with the echeverria from [facility], RN's note from 3/26. Spoke with Summer Haas NP and received verbal order to keep it in.    1500: Bedside and Verbal shift change report given to Kathleen Ferrell (oncoming nurse) by Sneha Billy (offgoing nurse). Report included the following information SBAR.

## 2021-04-07 NOTE — PROGRESS NOTES
Patient has been accepted to return to [facility] for admission today. Patient will be going into room 231B nurse to call report to [phone]. CM called [address] [phone], she agrees with discharge today. Discharge plan of care/case management plan validated with provider discharge order. CM notified primary nurse.

## 2021-04-07 NOTE — DISCHARGE INSTRUCTIONS
Patient Education        Atrial Fibrillation: Care Instructions  Your Care Instructions     Atrial fibrillation is an irregular and often fast heartbeat. Treating this condition is important for several reasons. It can cause blood clots, which can travel from your heart to your brain and cause a stroke. If you have a fast heartbeat, you may feel lightheaded, dizzy, and weak. An irregular heartbeat can also increase your risk for heart failure. Atrial fibrillation is often the result of another heart condition, such as high blood pressure or coronary artery disease. Making changes to improve your heart condition will help you stay healthy and active. Follow-up care is a key part of your treatment and safety. Be sure to make and go to all appointments, and call your doctor if you are having problems. It's also a good idea to know your test results and keep a list of the medicines you take. How can you care for yourself at home? Medicines    · Take your medicines exactly as prescribed. Call your doctor if you think you are having a problem with your medicine. You will get more details on the specific medicines your doctor prescribes.     · If your doctor has given you a blood thinner to prevent a stroke, be sure you get instructions about how to take your medicine safely. Blood thinners can cause serious bleeding problems.     · Do not take any vitamins, over-the-counter drugs, or herbal products without talking to your doctor first.   Lifestyle changes    · Do not smoke. Smoking can increase your chance of a stroke and heart attack. If you need help quitting, talk to your doctor about stop-smoking programs and medicines. These can increase your chances of quitting for good.     · Eat a heart-healthy diet.     · Stay at a healthy weight. Lose weight if you need to.     · Limit alcohol to 2 drinks a day for men and 1 drink a day for women. Too much alcohol can cause health problems.     · Avoid colds and flu.  Get a pneumococcal vaccine shot. If you have had one before, ask your doctor whether you need another dose. Get a flu shot every year. If you must be around people with colds or flu, wash your hands often. Activity    · If your doctor recommends it, get more exercise. Walking is a good choice. Bit by bit, increase the amount you walk every day. Try for at least 30 minutes on most days of the week. You also may want to swim, bike, or do other activities. Your doctor may suggest that you join a cardiac rehabilitation program so that you can have help increasing your physical activity safely.     · Start light exercise if your doctor says it is okay. Even a small amount will help you get stronger, have more energy, and manage stress. Walking is an easy way to get exercise. Start out by walking a little more than you did in the hospital. Gradually increase the amount you walk.     · When you exercise, watch for signs that your heart is working too hard. You are pushing too hard if you cannot talk while you are exercising. If you become short of breath or dizzy or have chest pain, sit down and rest immediately.     · Check your pulse regularly. Place two fingers on the artery at the palm side of your wrist, in line with your thumb. If your heartbeat seems uneven or fast, talk to your doctor. When should you call for help? Call 911 anytime you think you may need emergency care. For example, call if:    · You have symptoms of a heart attack. These may include:  ? Chest pain or pressure, or a strange feeling in the chest.  ? Sweating. ? Shortness of breath. ? Nausea or vomiting. ? Pain, pressure, or a strange feeling in the back, neck, jaw, or upper belly or in one or both shoulders or arms. ? Lightheadedness or sudden weakness. ? A fast or irregular heartbeat. After you call 911, the  may tell you to chew 1 adult-strength or 2 to 4 low-dose aspirin. Wait for an ambulance.  Do not try to drive yourself.     · You have symptoms of a stroke. These may include:  ? Sudden numbness, tingling, weakness, or loss of movement in your face, arm, or leg, especially on only one side of your body. ? Sudden vision changes. ? Sudden trouble speaking. ? Sudden confusion or trouble understanding simple statements. ? Sudden problems with walking or balance. ? A sudden, severe headache that is different from past headaches.     · You passed out (lost consciousness). Call your doctor now or seek immediate medical care if:    · You have new or increased shortness of breath.     · You feel dizzy or lightheaded, or you feel like you may faint.     · Your heart rate becomes irregular.     · You can feel your heart flutter in your chest or skip heartbeats. Tell your doctor if these symptoms are new or worse. Watch closely for changes in your health, and be sure to contact your doctor if you have any problems. Where can you learn more? Go to http://www.gray.com/  Enter U020 in the search box to learn more about \"Atrial Fibrillation: Care Instructions. \"  Current as of: August 31, 2020               Content Version: 12.8  © 1562-1654 Submittable. Care instructions adapted under license by [employer] (which disclaims liability or warranty for this information). If you have questions about a medical condition or this instruction, always ask your healthcare professional. [address] any warranty or liability for your use of this information. Patient Education   Patient Education        Abnormal Weight Loss: Care Instructions  Your Care Instructions     There are two types of weight loss--normal and abnormal. The normal kind happens when you are trying to lose weight by exercising more or eating less. The abnormal kind happens when you are not trying to lose weight. Many medical problems can cause abnormal weight loss.  These include problems with your thyroid gland, long-term infections, mouth or throat problems that make it hard to eat, and digestive problems. They also include depression and cancer. Some medicines also may cause you to lose weight. You can work with your doctor to find the cause of your weight loss. You will probably need tests to do this. Follow-up care is a key part of your treatment and safety. Be sure to make and go to all appointments, and call your doctor if you are having problems. It's also a good idea to know your test results and keep a list of the medicines you take. How can you care for yourself at home? · Weigh yourself at the same time every day. It's best to do it first thing in the morning after you empty your bladder. Be sure to always wear the same amount of clothing. · Write down any changes in your weight and the possible causes. Discuss these with your doctor. · Your doctor may want you to change your diet. Do your best to follow his or her advice. · Ask your doctor if you should see a dietitian. This is a person who can help you plan meals that work best for your lifestyle. · Note any changes in bowel habits. These may include changes in how often you have a bowel movement. Other changes include the color and size of your stools and how solid they are. · If you are prescribed medicines, take them exactly as prescribed. Call your doctor if you think you are having a problem with your medicine. You will get more details on the specific medicines your doctor prescribes. When should you call for help? Watch closely for changes in your health, and be sure to contact your doctor if:    · You do not get better as expected.     · You continue to lose weight. Where can you learn more? Go to http://www.gray.com/  Enter A790 in the search box to learn more about \"Abnormal Weight Loss: Care Instructions. \"  Current as of: September 23, 2020               Content Version: 12.8  © 9507-3488 [employer]. Care instructions adapted under license by [employer] (which disclaims liability or warranty for this information). If you have questions about a medical condition or this instruction, always ask your healthcare professional. [address] any warranty or liability for your use of this information. Learning About Living With a Feeding Tube  What is tube feeding? Your body needs nutrition to stay strong and help you live a healthy life. If you're unable to eat, or if you have an illness that makes it hard to swallow food, you may need a feeding tube. The tube is placed in the stomach and is used to give food, liquids, and medicines. Depending on why you need a feeding tube, you may have it for several weeks or months or longer. When you first get a feeding tube, your biggest challenge may be your new relationship with food. For many people, eating and savoring food is one of the most pleasing parts of daily life. You may grieve the loss of the daily habit of eating and the social aspects of sharing food with others. If you've struggled to get enough nutrition--if it's been hard to eat or swallow--having a feeding tube can help you regain your health and strength. And understanding how a feeding tube works is a first step toward dealing with changes that come with having the tube. It can also help you avoid common problems that can occur. What can you expect when you have a feeding tube? After surgery to insert a feeding tube, you'll have a 6- to 12-inch tube coming out of your belly. The tube is about the same width as a pen. There are different ways the tube can be used for feeding. Your doctor will help you decide which is best for you and how often feedings should occur. A feeding syringe. This is most common. A syringe is connected to the tube. A nutritional mixture (formula) is put into the syringe and flows into the tube and your stomach.  This is called bolus feeding. A gravity bag. Formula is placed into a special bag that is hung on a hook or a pole. The height and weight of the bag make the food flow down the tube and into your stomach. A bag and pump. A pump is used to push formula from a bag through the tube. This is also called continuous feeding. How do you use a feeding tube? It's important that the food you use for tube feeding have the right blend of nutrients for you. And the food needs to be the correct thickness so the tube doesn't clog. For most people, a milk shake-type of formula works best for tube feeding. Your doctor or dietitian will help you find the right formula to use. Each time you use the tube for feeding:  · Make sure that the tube-feeding formula is at room temperature. · Wash your hands before you handle the tube and formula. Wash the top of the can of formula before you open it. · Follow your doctor's instructions for how much formula to use for each feeding. ? If using a feeding syringe: Connect the syringe to the tube, and put the formula into the syringe. Hold the syringe up high so the formula flows into the tube. Use the plunger on the syringe to gently push any remaining formula into the tube. ? If using a gravity bag: Connect the bag to the tube, and add the formula to the bag. Hang the bag on a hook or pole about 18 inches above the stomach. Depending on the type of formula, the food may take a few hours to flow through the tube. Ask your doctor what you can expect and how long it should take. ? If using a bag and pump, follow the instructions that come with the pump. · Sit up or keep your head up during the feeding and for 30 minutes after. · If you feel sick to your stomach or have stomach cramps during the feeding, slow the rate that the formula comes through the tube. Then slowly increase the rate as you can manage it. · Keep the formula in the refrigerator after you open it.  Don't let the formula sit at room temperature for more than 8 hours. Throw away any open cans of food after 24 hours, even if they have been refrigerated. · Talk with your doctor about changing your feedings or medicines if you are having problems with diarrhea, constipation, or vomiting. How do you care for a feeding tube? · Keep it clean. That's the most important thing you need to know about caring for your tube. Flush the tube with warm water before and after feedings or giving medicines. You can use a syringe to push water through the tube. Clean the end (opening) of the tube every day with an antiseptic wipe. · Always wash your hands before touching the tube. · Tape the tube to your body so the end is facing up. Look for medical tape in your local drugstore. It may irritate your skin less than other types of tape. Change the position of the tape every few days. · Clamp the tube when you're not using it. Put the clamp close to your body so that food and liquids don't run down the tube. · Keep the skin around the tube clean and dry. How do you avoid problems with a feeding tube? Blocked tube. A blocked tube can happen when the tube isn't flushed or when formula or medicines are too thick. Prevent blockage by flushing the tube with warm water before and after using the tube. If the tube is blocked, try to clear it by flushing the tube. Call your doctor if the tube won't clear. Don't use a wire or anything else to try to unclog a tube. A wire can poke a hole in the tube. Tube falls out. Don't try to put the tube back in by yourself. Call your doctor right away. The tube needs to be replaced before the opening in your belly closes, which can happen within hours. Leaking tube. A tube that leaks may be blocked, or it may not fit right. After checking the tube and flushing it to make sure that the tube isn't blocked, call your doctor. Where can you learn more?   Go to http://www.gray.com/  Enter F416 in the search box to learn more about \"Learning About Living With a Feeding Tube. \"  Current as of: December 17, 2020               Content Version: 12.8  © 2006-2021 [facility], [facility]. Care instructions adapted under license by [employer] (which disclaims liability or warranty for this information). If you have questions about a medical condition or this instruction, always ask your healthcare professional. [place] [address] any warranty or liability for your use of this information.

## 2021-04-07 NOTE — PROGRESS NOTES
Problem: Risk for Spread of Infection  Goal: Prevent transmission of infectious organism to others  Description: Prevent the transmission of infectious organisms to other patients, staff members, and visitors. Outcome: Progressing Towards Goal  Note: Patient is on contact precautions     Problem: Nutrition Deficit  Goal: *Optimize nutritional status  Outcome: Progressing Towards Goal  Note: Patient is at his goal feeding rate of 55 mL/hr       Bedside shift change report given to Elisabet (oncoming nurse) by Erin Hernandez RN (offgoing nurse). Report included the following information SBAR, Kardex, Intake/Output, MAR, Accordion and Cardiac Rhythm NSR.

## 2021-04-07 NOTE — PROGRESS NOTES
CM reviewed clinical chart. Patient is a resident at [facility]. CM inquired about bed availability at SNF for return today. Awaiting response.

## 2021-04-07 NOTE — PROGRESS NOTES
Renal progress note    Patient: Stiven Hernández MRN: [id number]  SSN: [id number]    YOB: 1945  Age: 66 y.o. Sex: male      Subjective:   Pt is seen at bedside,, AMS+, no distress  S/p PEG placement   S/p Flex sig yest  Stable renal functions and stable electrolytes  Plan to discharge to SNF today    Past Medical History:   Diagnosis Date    Anemia     BPH (benign prostatic hyperplasia)     CKD (chronic kidney disease)     [facility]     D[facility]     [facility]     GERD (gastroesophageal reflux disease)     Glaucoma     Hyperlipemia      History reviewed. No pertinent surgical history. History reviewed. No pertinent family history.   Social History     Tobacco Use    Smoking status: Unknown If Ever Smoked   Substance Use Topics    Alcohol use: Not Currently      Current Facility-Administered Medications   Medication Dose Route Frequency Provider Last Rate Last Admin    mupirocin (BACTROBAN) 2 % ointment   Topical DAILY Kathleen Mata NP   Given at 04/07/21 1427    phosphorus (K PHOS NEUTRAL) 250 mg tablet 2 Tab  2 Tab Oral BID Lynne Long MD   2 Tab at 04/07/21 0911    fentaNYL citrate (PF) injection    PRN Clay Landrum MD   25 mcg at 04/05/21 0925    famotidine (PF) (PEPCID) 20 mg in 0.9% sodium chloride 10 mL injection  20 mg IntraVENous Q12H Clay Landrum MD   20 mg at 04/07/21 0910    0.9% sodium chloride infusion 250 mL  250 mL IntraVENous PRN Clay Landrum MD        amiodarone (CORDARONE) tablet 200 mg  200 mg Oral DAILY Berhane Waller MD   200 mg at 04/07/21 0910    methylPREDNISolone (PF) (SOLU-MEDROL) injection 40 mg  40 mg IntraVENous Q12H Carlene Moon MD   40 mg at 04/07/21 0910    mirtazapine (REMERON SOL-TAB) disintegrating tablet 15 mg  15 mg Oral QHS Daryle Capers, MD   15 mg at 04/06/21 2219    acetaminophen (TYLENOL) tablet 650 mg  650 mg Oral Q6H PRN Sandrita Moon MD   650 mg at 04/03/21 2138    Or    acetaminophen (TYLENOL) suppository 650 mg  650 mg Rectal Q6H PRN Eliane Moon MD        polyethylene glycol (MIRALAX) packet 17 g  17 g Oral DAILY PRN Eliane Moon MD   17 g at 04/03/21 1042    ondansetron (ZOFRAN ODT) tablet 4 mg  4 mg Oral Q8H PRN Eliane Moon MD        Or    ondansetron [facility]) injection 4 mg  4 mg IntraVENous Q6H PRN Eliane Moon MD        cefTRIAXone (ROCEPHIN) 1 g in sterile water (preservative free) 10 mL IV syringe  1 g IntraVENous Q24H Erika Braun MD   1 g at 04/07/21 1427    atorvastatin (LIPITOR) tablet 40 mg  40 mg Oral QHS Carlene Moon MD   40 mg at 04/06/21 2219        Allergies   Allergen Reactions    Flonase [Fluticasone] Unknown (comments)         Objective:     Vitals:    04/07/21 0315 04/07/21 0638 04/07/21 0745 04/07/21 1532   BP: 119/60  123/74 126/69   Pulse: 93 94 95 81   Resp: 16  16 16   Temp: 97.6 °F (36.4 °C)  97.5 °F (36.4 °C) 97.9 °F (36.6 °C)   SpO2: 91%  93%    Weight:       Height:            Physical Exam:  General: AMS  Eyes: sclera anicteric  Oral Cavity: No thrush or ulcers  Neck: no JVD  Chest: Fair bilateral air entry  Heart: normal sounds  Abdomen: soft and non tender   :  Vazquez+  Lower Extremities: no edema  Skin: no rash  Neuro: ams            Assessment:       Plan:     1 acute kidney injury:   -Etiology is likely prerenal azotemia from volume depletion.    -On admission BUN/creatinine 207/8.1,   -Improving renal functions with IV hydration, creatinine down to 3.19---->0.6 today  -CT abdomen no evidence of hydronephrosis. -Good urine output present  -I will discontinue IV fluids    2. Hypernatremia.    -From free water deficit.    -Sodium 158-->155--157--137 today   -off hypotonic IVF . Continue to hold. And monitor sodium levels    3. Hyperkalemia. Improved potassium levels with medical management  -Continue to monitor potassium levels    4.   Altered mental status.    -Could be metabolic encephalopathy/UTI. -CT head no acute process. -Urinalysis consistent with UTI.    -Continue to monitor clinically    Signed By: Ignacia Hare MD     April 7, 2021

## 2021-04-08 NOTE — ANESTHESIA POSTPROCEDURE EVALUATION
Procedure(s):  PERCUTANEOUS ENDOSCOPIC GASTROSTOMY TUBE INSERTION.     total IV anesthesia, general    Anesthesia Post Evaluation      Multimodal analgesia: multimodal analgesia used between 6 hours prior to anesthesia start to PACU discharge  Patient location during evaluation: PACU  Patient participation: complete - patient participated  Level of consciousness: awake  Pain score: 0  Pain management: adequate  Airway patency: patent  Anesthetic complications: no  Cardiovascular status: acceptable  Respiratory status: acceptable  Hydration status: acceptable  Post anesthesia nausea and vomiting:  controlled  Final Post Anesthesia Temperature Assessment:  Normothermia (36.0-37.5 degrees C)      INITIAL Post-op Vital signs:   Vitals Value Taken Time   /82 04/07/21 2000   Temp 36.4 °C (97.6 °F) 04/07/21 2000   Pulse 92 04/07/21 2000   Resp 17 04/07/21 2000   SpO2 97 % 04/07/21 2000

## 2021-04-15 NOTE — ADDENDUM NOTE
Addendum  created 04/15/21 0118 by Bill Omalley MD    Attestation recorded in Intraprocedure, Clinical Note Signed, [address] filed

## 2021-08-03 PROBLEM — N17.9 ACUTE RENAL FAILURE (ARF) (HCC): Status: RESOLVED | Noted: 2021-03-27 | Resolved: 2021-08-03

## 2022-03-18 PROBLEM — E86.1 HYPOVOLEMIA: Status: ACTIVE | Noted: 2021-03-27

## 2022-03-18 PROBLEM — N17.9 AKI (ACUTE KIDNEY INJURY) (HCC): Status: ACTIVE | Noted: 2021-03-27

## 2022-03-19 PROBLEM — I48.91 ATRIAL FIBRILLATION, RAPID (HCC): Status: ACTIVE | Noted: 2021-03-27

## 2023-05-14 RX ORDER — PREDNISONE 10 MG/1
TABLET ORAL
COMMUNITY
Start: 2021-04-03

## 2023-05-14 RX ORDER — ATORVASTATIN CALCIUM 40 MG/1
40 TABLET, FILM COATED ORAL
COMMUNITY
Start: 2021-04-03

## 2023-05-14 RX ORDER — AMIODARONE HYDROCHLORIDE 200 MG/1
200 TABLET ORAL DAILY
COMMUNITY
Start: 2021-04-04

## 2023-05-14 RX ORDER — ASPIRIN 325 MG
325 TABLET ORAL DAILY
COMMUNITY
Start: 2021-04-04

## 2024-06-05 NOTE — DISCHARGE SUMMARY
Discharge Summary       PATIENT ID: John Calero  MRN: [id number]   YOB: 1945    DATE OF ADMISSION: 3/26/2021  8:44 PM    DATE OF DISCHARGE:   PRIMARY CARE PROVIDER: Gustavo Wakefield MD     ATTENDING PHYSICIAN: Donny Moon  DISCHARGING PROVIDER: Donny Moon      CONSULTATIONS: IP CONSULT TO CARDIOLOGY  IP CONSULT TO NEPHROLOGY  IP CONSULT TO NEPHROLOGY  IP CONSULT TO NEUROLOGY  IP CONSULT TO PSYCHIATRY  IP CONSULT TO GASTROENTEROLOGY    PROCEDURES/SURGERIES: Procedure(s):  PERCUTANEOUS ENDOSCOPIC GASTROSTOMY TUBE INSERTION    ADMITTING DIAGNOSES:    Patient Active Problem List    Diagnosis Date Noted    Hypovolemia 03/27/2021    [facility] 03/27/2021    Atrial fibrillation, rapid ([facility] 03/27/2021    JAE (acute kidney injury) ([facility] 03/27/2021       DISCHARGE DIAGNOSES / PLAN:      A. fib with rapid ventricular rate       Acute kidney injury       Acute Blood Loss Anemia secondary to bleeding from nose, mouth       Hypokalemia       Hypernatremia    UTI/negative culture     History of recent Covid     History of obstructive uropathy     Dementia     Functional quadriplegic     Dermatitis     History of CVA     History of DVT     Hyperlipidemia        DISCHARGE MEDICATIONS:  Current Discharge Medication List      START taking these medications    Details   amiodarone (CORDARONE) 200 mg tablet Take 1 Tab by mouth daily. Qty: 30 Tab, Refills: 0      aspirin (ASPIRIN) 325 mg tablet Take 1 Tab by mouth daily. Qty: 30 Tab, Refills: 0      atorvastatin (LIPITOR) 40 mg tablet Take 1 Tab by mouth nightly. Qty: 30 Tab, Refills: 0      predniSONE (DELTASONE) 10 mg tablet Tablet daily for 10 days  Qty: 10 Tab, Refills: 0               NOTIFY YOUR PHYSICIAN FOR ANY OF THE FOLLOWING:   Fever over 101 degrees for 24 hours. Chest pain, shortness of breath, fever, chills, nausea, vomiting, diarrhea, change in mentation, falling, weakness, bleeding.  Severe pain or pain not relieved by medications. Or, any other signs or symptoms that you may have questions about. DISPOSITION:  x  Home With:   OT  PT  HH  RN       Long term SNF/Inpatient Rehab    Independent/assisted living    Hospice    Other:       PATIENT CONDITION AT DISCHARGE: Stable      PHYSICAL EXAMINATION AT DISCHARGE:  General:          Alert, cooperative, no distress, appears stated age. HEENT:           Atraumatic, anicteric sclerae, pink conjunctivae                          No oral ulcers, mucosa moist, throat clear, dentition fair  Neck:               Supple, symmetrical  Lungs:             Clear to auscultation bilaterally. No Wheezing or Rhonchi. No rales. Chest wall:      No tenderness  No Accessory muscle use. Heart:              Regular  rhythm,  No  murmur   No edema  Abdomen:        Soft, non-tender. Not distended. Bowel sounds normal  Extremities:     No cyanosis. No clubbing,                            Skin turgor normal, Capillary refill normal  Skin:                Not pale. Not Jaundiced  No rashes   Psych:             Not anxious or agitated. Neurologic:      Alert, moves all extremities, answers questions appropriately and responds to commands     XR CHEST PORT   Final Result   NG tube position as above. Worsening lung aeration. CT ABD PELV WO CONT   Final Result   No acute abdominopelvic abnormality. Nonobstructing bilateral renal   calculi. Correlate for constipation given increased stool burden. End-stage   degenerative changes of the hips with postoperative change in the right   acetabulum. Pulmonary interstitial abnormality partially visualized in the acute   setting would suggest infection/inflammation or edema versus a combination of   these and in any case with early airspace component at the right lung base. CT HEAD WO CONT   Final Result   No acute intracranial abnormality. 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 Mild   generalized atrophy with nonspecific white matter abnormality that may indicate   chronic small vessel disease. 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 XR CHEST PORT   Final Result   Rotated exam. Diffuse interstitial abnormality and with question of   some superimposed nodular areas. In the acute setting edema, infection or   combination of these is considered however, especially given the possible   nodular opacities, follow-up to radiographic resolution and/or further   evaluation with chest CT if symptoms/findings do not resolve as expected with   treatment, or if indicated otherwise.                 Recent Results (from the past 24 hour(s))   GLUCOSE, POC    Collection Time: 04/02/21  3:41 PM   Result Value Ref Range    Glucose (POC) 122 (H) 65 - 100 mg/dL    Performed by Jeremy Bateman    RENAL FUNCTION PANEL    Collection Time: 04/03/21  7:25 AM   Result Value Ref Range    Sodium 138 136 - 145 mmol/L    Potassium 3.4 (L) 3.5 - 5.1 mmol/L    Chloride 106 97 - 108 mmol/L    CO2 23 21 - 32 mmol/L    Anion gap 9 5 - 15 mmol/L    Glucose 153 (H) 65 - 100 mg/dL    BUN 14 6 - 20 mg/dL    Creatinine 1.02 0.70 - 1.30 mg/dL    BUN/Creatinine ratio 14 12 - 20      GFR est AA >60 >60 ml/min/1.73m2    GFR est non-AA >60 >60 ml/min/1.73m2    Calcium 7.8 (L) 8.5 - 10.1 mg/dL    Phosphorus 1.5 (L) 2.6 - 4.7 mg/dL    Albumin 1.9 (L) 3.5 - 5.0 g/dL   EKG, 12 LEAD, SUBSEQUENT    Collection Time: 04/03/21 11:06 AM   Result Value Ref Range    Ventricular Rate 77 BPM    Atrial Rate 77 BPM    P-R Interval 164 ms    QRS Duration 100 ms    Q-T Interval 414 ms    QTC Calculation (Bezet) 468 ms    Calculated P Axis 57 degrees    Calculated R Axis 0 degrees    Calculated T Axis 34 degrees    Diagnosis       Normal sinus rhythm  Normal ECG    Confirmed by Timur Garduno MD, Bonita Lira ([phone]) on 4/3/2021 1:08:26 PM            HOSPITAL COURSE:       Patient is a 66y.o. year old male with significant past medical history of dementia history of stroke in the past history of COVID-19 pneumonitis in the past obstructive uropathy acute kidney injury proximal A. fib nonpressure chronic ulcer of the lower leg history of MRSA infection major depression history of acute emboli and thrombosis history of peripheral vascular disease history of hypertension hyperlipidemia history of GERD history of BPH history of CVA was transferred from the nursing home because on eating drinking well since patient was diagnosed with Covid after that stopped eating drinking  Seen by the ER physician work-up shows acute kidney injury with hyperkalemia    Patient was treated with IV fluids and also his NG tube and start feeding  Patient seen by the nephrologist during his admission  And also seen by the cardiologist regarding A.  Fib  Medication was adjusted patient was getting feeding with NG tube and NG tube was taken out GI was consulted and PEG tube was placed patient has significant amount of  Nasal bleed Heparin were discontinued and HH H&H was monitored  Now remained stable patient getting PEG tube feeding tolerating well not in distress    Patient hemoglobin is 8.4 remained stable BUN 14 creatinine 1.02 back to baseline cultures was negative patient was discharged to skilled care rehab in improved condition    Aspiration precautions    Signed:   Saumya Lozano MD  4/3/2021  3:17 PM Alert-The patient is alert, awake and responds to voice. The patient is oriented to time, place, and person. The triage nurse is able to obtain subjective information.

## (undated) DEVICE — CATH KT GASTMY PEG PUL 20FR --

## (undated) DEVICE — THE ENDO CARRY-ON PROCEDURE KIT CONTAINS ALL OF THE SUPPLIES AND INFECTION PREVENTION PRODUCTS NEEDED FOR ENDOSCOPIC PROCEDURES: Brand: ENDO CARRY-ON PROCEDURE KIT

## (undated) DEVICE — BINDER ABD H12IN FOR 30-45IN WAIST UNIV 4 PNL PREM DSGN E